# Patient Record
Sex: FEMALE | Race: WHITE | NOT HISPANIC OR LATINO | Employment: FULL TIME | URBAN - METROPOLITAN AREA
[De-identification: names, ages, dates, MRNs, and addresses within clinical notes are randomized per-mention and may not be internally consistent; named-entity substitution may affect disease eponyms.]

---

## 2021-03-27 ENCOUNTER — IMMUNIZATIONS (OUTPATIENT)
Dept: FAMILY MEDICINE CLINIC | Facility: HOSPITAL | Age: 46
End: 2021-03-27

## 2021-03-27 DIAGNOSIS — Z23 ENCOUNTER FOR IMMUNIZATION: Primary | ICD-10-CM

## 2021-03-27 PROCEDURE — 0001A SARS-COV-2 / COVID-19 MRNA VACCINE (PFIZER-BIONTECH) 30 MCG: CPT

## 2021-03-27 PROCEDURE — 91300 SARS-COV-2 / COVID-19 MRNA VACCINE (PFIZER-BIONTECH) 30 MCG: CPT

## 2021-04-04 ENCOUNTER — APPOINTMENT (OUTPATIENT)
Dept: ULTRASOUND IMAGING | Facility: HOSPITAL | Age: 46
DRG: 417 | End: 2021-04-04
Payer: COMMERCIAL

## 2021-04-04 ENCOUNTER — HOSPITAL ENCOUNTER (INPATIENT)
Facility: HOSPITAL | Age: 46
LOS: 2 days | Discharge: HOME | DRG: 417 | End: 2021-04-07
Attending: EMERGENCY MEDICINE | Admitting: SURGERY
Payer: COMMERCIAL

## 2021-04-04 ENCOUNTER — APPOINTMENT (EMERGENCY)
Dept: CT IMAGING | Facility: HOSPITAL | Age: 46
DRG: 417 | End: 2021-04-04
Payer: COMMERCIAL

## 2021-04-04 ENCOUNTER — APPOINTMENT (EMERGENCY)
Dept: RADIOLOGY | Facility: HOSPITAL | Age: 46
DRG: 417 | End: 2021-04-04
Payer: COMMERCIAL

## 2021-04-04 DIAGNOSIS — K80.50 BILIARY COLIC: ICD-10-CM

## 2021-04-04 DIAGNOSIS — K80.50 CHOLEDOCHOLITHIASIS: Primary | ICD-10-CM

## 2021-04-04 DIAGNOSIS — K80.50 CHOLEDOCHOLITHIASIS: ICD-10-CM

## 2021-04-04 LAB
ALBUMIN SERPL BCP-MCNC: 3 G/DL (ref 3.5–5)
ALBUMIN SERPL BCP-MCNC: 3.3 G/DL (ref 3.5–5)
ALP SERPL-CCNC: 119 U/L (ref 46–116)
ALP SERPL-CCNC: 122 U/L (ref 46–116)
ALT SERPL W P-5'-P-CCNC: 199 U/L (ref 12–78)
ALT SERPL W P-5'-P-CCNC: 77 U/L (ref 12–78)
ANION GAP SERPL CALCULATED.3IONS-SCNC: 11 MMOL/L (ref 4–13)
ANION GAP SERPL CALCULATED.3IONS-SCNC: 8 MMOL/L (ref 4–13)
APTT PPP: 24 SECONDS (ref 23–37)
AST SERPL W P-5'-P-CCNC: 137 U/L (ref 5–45)
AST SERPL W P-5'-P-CCNC: 305 U/L (ref 5–45)
ATRIAL RATE: 99 BPM
BASOPHILS # BLD AUTO: 0.01 THOUSANDS/ΜL (ref 0–0.1)
BASOPHILS # BLD AUTO: 0.03 THOUSANDS/ΜL (ref 0–0.1)
BASOPHILS NFR BLD AUTO: 0 % (ref 0–1)
BASOPHILS NFR BLD AUTO: 0 % (ref 0–1)
BILIRUB SERPL-MCNC: 0.78 MG/DL (ref 0.2–1)
BILIRUB SERPL-MCNC: 1.23 MG/DL (ref 0.2–1)
BILIRUB UR QL STRIP: NEGATIVE
BUN SERPL-MCNC: 12 MG/DL (ref 5–25)
BUN SERPL-MCNC: 9 MG/DL (ref 5–25)
CALCIUM ALBUM COR SERPL-MCNC: 8.8 MG/DL (ref 8.3–10.1)
CALCIUM ALBUM COR SERPL-MCNC: 9.5 MG/DL (ref 8.3–10.1)
CALCIUM SERPL-MCNC: 8 MG/DL (ref 8.3–10.1)
CALCIUM SERPL-MCNC: 8.9 MG/DL (ref 8.3–10.1)
CHLORIDE SERPL-SCNC: 107 MMOL/L (ref 100–108)
CHLORIDE SERPL-SCNC: 110 MMOL/L (ref 100–108)
CK SERPL-CCNC: 45 U/L (ref 26–192)
CLARITY UR: CLEAR
CO2 SERPL-SCNC: 24 MMOL/L (ref 21–32)
CO2 SERPL-SCNC: 24 MMOL/L (ref 21–32)
COLOR UR: YELLOW
CREAT SERPL-MCNC: 0.83 MG/DL (ref 0.6–1.3)
CREAT SERPL-MCNC: 0.9 MG/DL (ref 0.6–1.3)
D DIMER PPP FEU-MCNC: 0.44 UG/ML FEU
EOSINOPHIL # BLD AUTO: 0.01 THOUSAND/ΜL (ref 0–0.61)
EOSINOPHIL # BLD AUTO: 0.01 THOUSAND/ΜL (ref 0–0.61)
EOSINOPHIL NFR BLD AUTO: 0 % (ref 0–6)
EOSINOPHIL NFR BLD AUTO: 0 % (ref 0–6)
ERYTHROCYTE [DISTWIDTH] IN BLOOD BY AUTOMATED COUNT: 14.1 % (ref 11.6–15.1)
ERYTHROCYTE [DISTWIDTH] IN BLOOD BY AUTOMATED COUNT: 14.2 % (ref 11.6–15.1)
FLUAV RNA RESP QL NAA+PROBE: NEGATIVE
FLUBV RNA RESP QL NAA+PROBE: NEGATIVE
GFR SERPL CREATININE-BSD FRML MDRD: 76 ML/MIN/1.73SQ M
GFR SERPL CREATININE-BSD FRML MDRD: 84 ML/MIN/1.73SQ M
GLUCOSE SERPL-MCNC: 131 MG/DL (ref 65–140)
GLUCOSE SERPL-MCNC: 88 MG/DL (ref 65–140)
GLUCOSE UR STRIP-MCNC: NEGATIVE MG/DL
HCG SERPL QL: NEGATIVE
HCT VFR BLD AUTO: 38.1 % (ref 34.8–46.1)
HCT VFR BLD AUTO: 38.8 % (ref 34.8–46.1)
HGB BLD-MCNC: 11.9 G/DL (ref 11.5–15.4)
HGB BLD-MCNC: 12.2 G/DL (ref 11.5–15.4)
HGB UR QL STRIP.AUTO: NEGATIVE
IMM GRANULOCYTES # BLD AUTO: 0.02 THOUSAND/UL (ref 0–0.2)
IMM GRANULOCYTES # BLD AUTO: 0.02 THOUSAND/UL (ref 0–0.2)
IMM GRANULOCYTES NFR BLD AUTO: 0 % (ref 0–2)
IMM GRANULOCYTES NFR BLD AUTO: 0 % (ref 0–2)
INR PPP: 0.96 (ref 0.84–1.19)
KETONES UR STRIP-MCNC: NEGATIVE MG/DL
LACTATE SERPL-SCNC: 1.2 MMOL/L (ref 0.5–2)
LEUKOCYTE ESTERASE UR QL STRIP: NEGATIVE
LIPASE SERPL-CCNC: 220 U/L (ref 73–393)
LIPASE SERPL-CCNC: 7021 U/L (ref 73–393)
LYMPHOCYTES # BLD AUTO: 0.83 THOUSANDS/ΜL (ref 0.6–4.47)
LYMPHOCYTES # BLD AUTO: 1.08 THOUSANDS/ΜL (ref 0.6–4.47)
LYMPHOCYTES NFR BLD AUTO: 10 % (ref 14–44)
LYMPHOCYTES NFR BLD AUTO: 18 % (ref 14–44)
MCH RBC QN AUTO: 28.6 PG (ref 26.8–34.3)
MCH RBC QN AUTO: 28.7 PG (ref 26.8–34.3)
MCHC RBC AUTO-ENTMCNC: 31.2 G/DL (ref 31.4–37.4)
MCHC RBC AUTO-ENTMCNC: 31.4 G/DL (ref 31.4–37.4)
MCV RBC AUTO: 91 FL (ref 82–98)
MCV RBC AUTO: 92 FL (ref 82–98)
MONOCYTES # BLD AUTO: 0.44 THOUSAND/ΜL (ref 0.17–1.22)
MONOCYTES # BLD AUTO: 0.53 THOUSAND/ΜL (ref 0.17–1.22)
MONOCYTES NFR BLD AUTO: 5 % (ref 4–12)
MONOCYTES NFR BLD AUTO: 9 % (ref 4–12)
NEUTROPHILS # BLD AUTO: 4.42 THOUSANDS/ΜL (ref 1.85–7.62)
NEUTROPHILS # BLD AUTO: 6.78 THOUSANDS/ΜL (ref 1.85–7.62)
NEUTS SEG NFR BLD AUTO: 73 % (ref 43–75)
NEUTS SEG NFR BLD AUTO: 85 % (ref 43–75)
NITRITE UR QL STRIP: NEGATIVE
NRBC BLD AUTO-RTO: 0 /100 WBCS
NRBC BLD AUTO-RTO: 0 /100 WBCS
P AXIS: 57 DEGREES
PH UR STRIP.AUTO: 5.5 [PH]
PLATELET # BLD AUTO: 285 THOUSANDS/UL (ref 149–390)
PLATELET # BLD AUTO: 294 THOUSANDS/UL (ref 149–390)
PMV BLD AUTO: 10 FL (ref 8.9–12.7)
PMV BLD AUTO: 9.8 FL (ref 8.9–12.7)
POTASSIUM SERPL-SCNC: 3.9 MMOL/L (ref 3.5–5.3)
POTASSIUM SERPL-SCNC: 4 MMOL/L (ref 3.5–5.3)
PR INTERVAL: 178 MS
PROT SERPL-MCNC: 7 G/DL (ref 6.4–8.2)
PROT SERPL-MCNC: 7.5 G/DL (ref 6.4–8.2)
PROT UR STRIP-MCNC: NEGATIVE MG/DL
PROTHROMBIN TIME: 12.8 SECONDS (ref 11.6–14.5)
QRS AXIS: -12 DEGREES
QRSD INTERVAL: 72 MS
QT INTERVAL: 328 MS
QTC INTERVAL: 420 MS
RBC # BLD AUTO: 4.14 MILLION/UL (ref 3.81–5.12)
RBC # BLD AUTO: 4.26 MILLION/UL (ref 3.81–5.12)
RSV RNA RESP QL NAA+PROBE: NEGATIVE
SARS-COV-2 RNA RESP QL NAA+PROBE: NEGATIVE
SODIUM SERPL-SCNC: 142 MMOL/L (ref 136–145)
SODIUM SERPL-SCNC: 142 MMOL/L (ref 136–145)
SP GR UR STRIP.AUTO: 1.01 (ref 1–1.03)
T WAVE AXIS: 35 DEGREES
TROPONIN I SERPL-MCNC: <0.02 NG/ML
TSH SERPL DL<=0.05 MIU/L-ACNC: 2.04 UIU/ML (ref 0.36–3.74)
UROBILINOGEN UR QL STRIP.AUTO: 0.2 E.U./DL
VENTRICULAR RATE: 99 BPM
WBC # BLD AUTO: 6.07 THOUSAND/UL (ref 4.31–10.16)
WBC # BLD AUTO: 8.11 THOUSAND/UL (ref 4.31–10.16)

## 2021-04-04 PROCEDURE — 71260 CT THORAX DX C+: CPT

## 2021-04-04 PROCEDURE — 74177 CT ABD & PELVIS W/CONTRAST: CPT

## 2021-04-04 PROCEDURE — 85379 FIBRIN DEGRADATION QUANT: CPT | Performed by: EMERGENCY MEDICINE

## 2021-04-04 PROCEDURE — 96374 THER/PROPH/DIAG INJ IV PUSH: CPT

## 2021-04-04 PROCEDURE — 96361 HYDRATE IV INFUSION ADD-ON: CPT

## 2021-04-04 PROCEDURE — 84484 ASSAY OF TROPONIN QUANT: CPT | Performed by: EMERGENCY MEDICINE

## 2021-04-04 PROCEDURE — 85730 THROMBOPLASTIN TIME PARTIAL: CPT | Performed by: EMERGENCY MEDICINE

## 2021-04-04 PROCEDURE — C9113 INJ PANTOPRAZOLE SODIUM, VIA: HCPCS | Performed by: EMERGENCY MEDICINE

## 2021-04-04 PROCEDURE — 83605 ASSAY OF LACTIC ACID: CPT | Performed by: EMERGENCY MEDICINE

## 2021-04-04 PROCEDURE — 83690 ASSAY OF LIPASE: CPT | Performed by: EMERGENCY MEDICINE

## 2021-04-04 PROCEDURE — 99285 EMERGENCY DEPT VISIT HI MDM: CPT

## 2021-04-04 PROCEDURE — 99285 EMERGENCY DEPT VISIT HI MDM: CPT | Performed by: EMERGENCY MEDICINE

## 2021-04-04 PROCEDURE — 81003 URINALYSIS AUTO W/O SCOPE: CPT | Performed by: EMERGENCY MEDICINE

## 2021-04-04 PROCEDURE — 84703 CHORIONIC GONADOTROPIN ASSAY: CPT | Performed by: EMERGENCY MEDICINE

## 2021-04-04 PROCEDURE — 93005 ELECTROCARDIOGRAM TRACING: CPT

## 2021-04-04 PROCEDURE — 85025 COMPLETE CBC W/AUTO DIFF WBC: CPT | Performed by: EMERGENCY MEDICINE

## 2021-04-04 PROCEDURE — 76705 ECHO EXAM OF ABDOMEN: CPT

## 2021-04-04 PROCEDURE — 84443 ASSAY THYROID STIM HORMONE: CPT | Performed by: EMERGENCY MEDICINE

## 2021-04-04 PROCEDURE — 80053 COMPREHEN METABOLIC PANEL: CPT | Performed by: EMERGENCY MEDICINE

## 2021-04-04 PROCEDURE — 93010 ELECTROCARDIOGRAM REPORT: CPT | Performed by: INTERNAL MEDICINE

## 2021-04-04 PROCEDURE — 85025 COMPLETE CBC W/AUTO DIFF WBC: CPT | Performed by: STUDENT IN AN ORGANIZED HEALTH CARE EDUCATION/TRAINING PROGRAM

## 2021-04-04 PROCEDURE — 83690 ASSAY OF LIPASE: CPT | Performed by: NURSE PRACTITIONER

## 2021-04-04 PROCEDURE — 80053 COMPREHEN METABOLIC PANEL: CPT | Performed by: STUDENT IN AN ORGANIZED HEALTH CARE EDUCATION/TRAINING PROGRAM

## 2021-04-04 PROCEDURE — 36415 COLL VENOUS BLD VENIPUNCTURE: CPT | Performed by: EMERGENCY MEDICINE

## 2021-04-04 PROCEDURE — 99219 PR INITIAL OBSERVATION CARE/DAY 50 MINUTES: CPT | Performed by: SURGERY

## 2021-04-04 PROCEDURE — 0241U HB NFCT DS VIR RESP RNA 4 TRGT: CPT | Performed by: NURSE PRACTITIONER

## 2021-04-04 PROCEDURE — 82550 ASSAY OF CK (CPK): CPT | Performed by: EMERGENCY MEDICINE

## 2021-04-04 PROCEDURE — 96375 TX/PRO/DX INJ NEW DRUG ADDON: CPT

## 2021-04-04 PROCEDURE — 85610 PROTHROMBIN TIME: CPT | Performed by: EMERGENCY MEDICINE

## 2021-04-04 PROCEDURE — G1004 CDSM NDSC: HCPCS

## 2021-04-04 PROCEDURE — 71045 X-RAY EXAM CHEST 1 VIEW: CPT

## 2021-04-04 PROCEDURE — 99222 1ST HOSP IP/OBS MODERATE 55: CPT | Performed by: INTERNAL MEDICINE

## 2021-04-04 RX ORDER — DEXTROSE, SODIUM CHLORIDE, AND POTASSIUM CHLORIDE 5; .45; .15 G/100ML; G/100ML; G/100ML
125 INJECTION INTRAVENOUS CONTINUOUS
Status: DISCONTINUED | OUTPATIENT
Start: 2021-04-04 | End: 2021-04-06

## 2021-04-04 RX ORDER — OXYCODONE HYDROCHLORIDE 10 MG/1
10 TABLET ORAL EVERY 4 HOURS PRN
Status: DISCONTINUED | OUTPATIENT
Start: 2021-04-04 | End: 2021-04-04

## 2021-04-04 RX ORDER — ONDANSETRON 2 MG/ML
4 INJECTION INTRAMUSCULAR; INTRAVENOUS ONCE
Status: COMPLETED | OUTPATIENT
Start: 2021-04-04 | End: 2021-04-04

## 2021-04-04 RX ORDER — MAGNESIUM HYDROXIDE/ALUMINUM HYDROXICE/SIMETHICONE 120; 1200; 1200 MG/30ML; MG/30ML; MG/30ML
30 SUSPENSION ORAL ONCE
Status: COMPLETED | OUTPATIENT
Start: 2021-04-04 | End: 2021-04-04

## 2021-04-04 RX ORDER — PANTOPRAZOLE SODIUM 40 MG/1
40 INJECTION, POWDER, FOR SOLUTION INTRAVENOUS ONCE
Status: COMPLETED | OUTPATIENT
Start: 2021-04-04 | End: 2021-04-04

## 2021-04-04 RX ORDER — KETOROLAC TROMETHAMINE 30 MG/ML
15 INJECTION, SOLUTION INTRAMUSCULAR; INTRAVENOUS ONCE
Status: COMPLETED | OUTPATIENT
Start: 2021-04-04 | End: 2021-04-04

## 2021-04-04 RX ORDER — CEFAZOLIN SODIUM 1 G/50ML
1000 SOLUTION INTRAVENOUS EVERY 8 HOURS
Status: DISCONTINUED | OUTPATIENT
Start: 2021-04-04 | End: 2021-04-06

## 2021-04-04 RX ORDER — KETOROLAC TROMETHAMINE 30 MG/ML
15 INJECTION, SOLUTION INTRAMUSCULAR; INTRAVENOUS EVERY 6 HOURS PRN
Status: DISPENSED | OUTPATIENT
Start: 2021-04-04 | End: 2021-04-05

## 2021-04-04 RX ORDER — SODIUM CHLORIDE 9 MG/ML
125 INJECTION, SOLUTION INTRAVENOUS CONTINUOUS
Status: DISCONTINUED | OUTPATIENT
Start: 2021-04-04 | End: 2021-04-04

## 2021-04-04 RX ORDER — ACETAMINOPHEN 325 MG/1
650 TABLET ORAL EVERY 6 HOURS PRN
Status: DISCONTINUED | OUTPATIENT
Start: 2021-04-04 | End: 2021-04-07 | Stop reason: HOSPADM

## 2021-04-04 RX ORDER — OXYCODONE HYDROCHLORIDE 5 MG/1
5 TABLET ORAL EVERY 4 HOURS PRN
Status: DISCONTINUED | OUTPATIENT
Start: 2021-04-04 | End: 2021-04-07 | Stop reason: HOSPADM

## 2021-04-04 RX ORDER — ONDANSETRON 2 MG/ML
4 INJECTION INTRAMUSCULAR; INTRAVENOUS EVERY 6 HOURS PRN
Status: DISCONTINUED | OUTPATIENT
Start: 2021-04-04 | End: 2021-04-06 | Stop reason: SDUPTHER

## 2021-04-04 RX ORDER — OXYCODONE HYDROCHLORIDE 5 MG/1
5 TABLET ORAL EVERY 4 HOURS PRN
Status: DISCONTINUED | OUTPATIENT
Start: 2021-04-04 | End: 2021-04-04

## 2021-04-04 RX ADMIN — KETOROLAC TROMETHAMINE 15 MG: 30 INJECTION, SOLUTION INTRAMUSCULAR at 02:35

## 2021-04-04 RX ADMIN — FAMOTIDINE 20 MG: 10 INJECTION INTRAVENOUS at 01:55

## 2021-04-04 RX ADMIN — DEXTROSE, SODIUM CHLORIDE, AND POTASSIUM CHLORIDE 125 ML/HR: 5; .45; .15 INJECTION INTRAVENOUS at 13:12

## 2021-04-04 RX ADMIN — CEFAZOLIN SODIUM 1000 MG: 1 SOLUTION INTRAVENOUS at 19:10

## 2021-04-04 RX ADMIN — SODIUM CHLORIDE 125 ML/HR: 0.9 INJECTION, SOLUTION INTRAVENOUS at 06:13

## 2021-04-04 RX ADMIN — CEFAZOLIN SODIUM 1000 MG: 1 SOLUTION INTRAVENOUS at 11:00

## 2021-04-04 RX ADMIN — ALUMINA, MAGNESIA, AND SIMETHICONE ORAL SUSPENSION REGULAR STRENGTH 30 ML: 1200; 1200; 120 SUSPENSION ORAL at 01:50

## 2021-04-04 RX ADMIN — METRONIDAZOLE 500 MG: 500 INJECTION, SOLUTION INTRAVENOUS at 18:25

## 2021-04-04 RX ADMIN — IOHEXOL 100 ML: 350 INJECTION, SOLUTION INTRAVENOUS at 02:55

## 2021-04-04 RX ADMIN — SODIUM CHLORIDE 1000 ML: 0.9 INJECTION, SOLUTION INTRAVENOUS at 01:54

## 2021-04-04 RX ADMIN — DEXTROSE, SODIUM CHLORIDE, AND POTASSIUM CHLORIDE 125 ML/HR: 5; .45; .15 INJECTION INTRAVENOUS at 22:44

## 2021-04-04 RX ADMIN — METRONIDAZOLE 500 MG: 500 INJECTION, SOLUTION INTRAVENOUS at 11:46

## 2021-04-04 RX ADMIN — ONDANSETRON 4 MG: 2 INJECTION INTRAMUSCULAR; INTRAVENOUS at 01:53

## 2021-04-04 RX ADMIN — PANTOPRAZOLE SODIUM 40 MG: 40 INJECTION, POWDER, FOR SOLUTION INTRAVENOUS at 02:35

## 2021-04-04 NOTE — UTILIZATION REVIEW
Initial Clinical Review    Admission: Date/Time/Statement:   Admission Orders (From admission, onward)     Ordered        04/04/21 0445  Place in Observation  Once                   Orders Placed This Encounter   Procedures    Place in Observation     Standing Status:   Standing     Number of Occurrences:   1     Order Specific Question:   Level of Care     Answer:   Med Surg [16]     ED Arrival Information     Expected Arrival Acuity Means of Arrival Escorted By Service Admission Type    - 4/4/2021 01:08 Urgent Walk-In Self Surgery-General Urgent    Arrival Complaint    Indigestion,abd pain        Chief Complaint   Patient presents with    Heartburn     started 3 days ago  +nausea  pt reports relief with belching  Assessment/Plan:   50y Female to ED presents with abdominal pain, nausea started on Thursday located in the epigastrium and RUQ and radiates to the upper back  Pain worsening with eating  Admit Observation level of care for Biliary colic vs cholecystitis, possible CBD stone on CT  RUQ US  Trend LFTs  Possible laparoscopic cholecystectomy  NPO/ IVFs  Pain control  On exam; mildly tender epigastrium  4/4 GI cons; Choledocholithiasis  RUQ/epigastric pain and nausea secondary to US findings of choledocholithiasis and gallstones  she had shaking chills at home concerning for cholangitis, but afebrile without leukocytosis or total bilirubin on presentation  LFTs worsened this morning, , , alkaline phosphatase 122, total bilirubin 1 23  Pain has improved and she has no further nausea, asking for clear liquids  US gallbladder: CBD dilated measuring up to 9 mm as well as mild intrahepatic biliary ductal dilatation  There is a 7 mm echogenic focus in the distal CBD in keeping with choledocholithiasis with mild gallbladder wall thickening, which may be secondary to choledocholithiasis with early acute cholecystitis not included   Plan for ERCP with possible sphincterotomy tomorrow for further evaluation and removal of stones  Clear liquid diet  NPO midnight  Continue antibiotics to cover for cholangitis due to obstruction  Timing of laparoscopic cholecystectomy to be determined by surgery  ED Triage Vitals   Temperature Pulse Respirations Blood Pressure SpO2   04/04/21 0123 04/04/21 0123 04/04/21 0123 04/04/21 0123 04/04/21 0123   98 3 °F (36 8 °C) 103 18 158/74 98 %      Temp Source Heart Rate Source Patient Position - Orthostatic VS BP Location FiO2 (%)   04/04/21 0123 04/04/21 0123 04/04/21 0123 04/04/21 0123 --   Oral Monitor Lying Right arm       Pain Score       04/04/21 0235       2          Wt Readings from Last 1 Encounters:   04/04/21 126 kg (276 lb 10 8 oz)     Additional Vital Signs:   04/04/21 0724  --  --  --  --  --  --  None (Room air)  --   04/04/21 0618  --  --  --  --  --  --  None (Room air)  --   04/04/21 0400  --  96  18  124/66  88  99 %  None (Room air)  Lying   04/04/21 0345  --  90  18  158/74  --  98 %  None (Room air)       Pertinent Labs/Diagnostic Test Results:   4/4  CT Chest/Abd/Pelvis - There is mild intrahepatic biliary ductal dilatation and questionable stone in the common duct (2:58; 601:77) raising concern for choledocholithiasis  Recommend MRCP for further evaluation  There is a cystic outpouching from the gallbladder; gallbladder demonstrates calcifications in the wall  The patchy may represent a gallbladder diverticulum or a form of adenomyomatosis  Considering the above-mentioned choledocholithiasis, consider   cholecystitis in the appropriate clinical setting      PCXR -     US Gallbladder -       Results from last 7 days   Lab Units 04/04/21  0144   WBC Thousand/uL 8 11   HEMOGLOBIN g/dL 12 2   HEMATOCRIT % 38 8   PLATELETS Thousands/uL 294   NEUTROS ABS Thousands/µL 6 78         Results from last 7 days   Lab Units 04/04/21  0144   SODIUM mmol/L 142   POTASSIUM mmol/L 3 9   CHLORIDE mmol/L 107   CO2 mmol/L 24   ANION GAP mmol/L 11   BUN mg/dL 12 CREATININE mg/dL 0 90   EGFR ml/min/1 73sq m 76   CALCIUM mg/dL 8 9     Results from last 7 days   Lab Units 04/04/21  0144   AST U/L 137*   ALT U/L 77   ALK PHOS U/L 119*   TOTAL PROTEIN g/dL 7 5   ALBUMIN g/dL 3 3*   TOTAL BILIRUBIN mg/dL 0 78         Results from last 7 days   Lab Units 04/04/21  0144   GLUCOSE RANDOM mg/dL 131       Results from last 7 days   Lab Units 04/04/21  0144   CK TOTAL U/L 45     Results from last 7 days   Lab Units 04/04/21  0144   TROPONIN I ng/mL <0 02     Results from last 7 days   Lab Units 04/04/21  0144   D-DIMER QUANTITATIVE ug/ml FEU 0 44     Results from last 7 days   Lab Units 04/04/21  0144   PROTIME seconds 12 8   INR  0 96   PTT seconds 24     Results from last 7 days   Lab Units 04/04/21  0144   TSH 3RD GENERATON uIU/mL 2 037         Results from last 7 days   Lab Units 04/04/21  0144   LACTIC ACID mmol/L 1 2     Results from last 7 days   Lab Units 04/04/21  0144   LIPASE u/L 220             Results from last 7 days   Lab Units 04/04/21  0240   CLARITY UA  Clear   COLOR UA  Yellow   SPEC GRAV UA  1 010   PH UA  5 5   GLUCOSE UA mg/dl Negative   KETONES UA mg/dl Negative   BLOOD UA  Negative   PROTEIN UA mg/dl Negative   NITRITE UA  Negative   BILIRUBIN UA  Negative   UROBILINOGEN UA E U /dl 0 2   LEUKOCYTES UA  Negative       ED Treatment:   Medication Administration from 04/04/2021 0108 to 04/04/2021 0543       Date/Time Order Dose Route Action     04/04/2021 0153 ondansetron (ZOFRAN) injection 4 mg 4 mg Intravenous Given     04/04/2021 0155 famotidine (PEPCID) injection 20 mg 20 mg Intravenous Given     04/04/2021 0150 aluminum-magnesium hydroxide-simethicone (MYLANTA) oral suspension 30 mL 30 mL Oral Given     04/04/2021 0154 sodium chloride 0 9 % bolus 1,000 mL 1,000 mL Intravenous New Bag     04/04/2021 0235 pantoprazole (PROTONIX) injection 40 mg 40 mg Intravenous Given     04/04/2021 0235 ketorolac (TORADOL) injection 15 mg 15 mg Intravenous Given     04/04/2021 0255 iohexol (OMNIPAQUE) 350 MG/ML injection (MULTI-DOSE) 100 mL 100 mL Intravenous Given        History reviewed  No pertinent past medical history  Present on Admission:  **None**      Admitting Diagnosis: Biliary colic [O93 56]  Choledocholithiasis [K80 50]  Indigestion [K30]  Abdominal pain [R10 9]  Age/Sex: 50 y o  female     Admission Orders:  Scheduled Medications:  enoxaparin, 40 mg, Subcutaneous, Daily      Continuous IV Infusions:  sodium chloride, 125 mL/hr, Intravenous, Continuous      PRN Meds:  acetaminophen, 650 mg, Oral, Q6H PRN  ketorolac, 15 mg, Intravenous, Q6H PRN  ondansetron, 4 mg, Intravenous, Q6H PRN  oxyCODONE, 5 mg, Oral, Q4H PRN      NPO; Sips with meds    Network Utilization Review Department  ATTENTION: Please call with any questions or concerns to [phone] and carefully listen to the prompts so that you are directed to the right person  All voicemails are confidential   Elroy Matson all requests for admission clinical reviews, approved or denied determinations and any other requests to dedicated fax number below belonging to the campus where the patient is receiving treatment   List of dedicated fax numbers for the Facilities:  [address] DENIALS (Administrative/Medical Necessity) [phone]   [address] (Maternity/NICU/Pediatrics) [phone]   [address] [address] [facility] Tierra Bhatt 1277 (Sukh Fisher "Lola" 103) [address] [facility] 28 Daphney Wong 1481 [address] [place] [address] [phone]

## 2021-04-04 NOTE — ED PROVIDER NOTES
History  Chief Complaint   Patient presents with    Heartburn     started 3 days ago  +nausea  pt reports relief with belching  Patient is a 50year old female with several days of worsening nausea, indigestion, heartburn, chest tightness  No vomiting or diarrhea  No GI bleeding  Has had prior ectopic pregnancy surgery  LMP- just finished  Tried pepcid tonight after having niesha without relief  States her HR has been elevated at times and then low  No recent old records from this ED seen on computer system  [facility] checked on this patient and no Rx found  (+) pleuritic pain  No travel  No alcohol use for past 2-3 weeks  Pain radiates to the back  Early CAD in parents  History provided by:  Patient   used: No    Heartburn  Associated symptoms: chest pain and nausea    Associated symptoms: no cough, no fever, no shortness of breath and no vomiting        None       History reviewed  No pertinent past medical history  Past Surgical History:   Procedure Laterality Date    ECTOPIC PREGNANCY SURGERY         History reviewed  No pertinent family history  I have reviewed and agree with the history as documented  E-Cigarette/Vaping    E-Cigarette Use Current Some Day User     Comments CBD      E-Cigarette/Vaping Substances     Social History     Tobacco Use    Smoking status: Never Smoker    Smokeless tobacco: Never Used   Substance Use Topics    Alcohol use: Yes     Comment: socially    Drug use: Yes     Types: Marijuana       Review of Systems   Constitutional: Negative for fever  Respiratory: Positive for chest tightness  Negative for cough and shortness of breath  Cardiovascular: Positive for chest pain  Gastrointestinal: Positive for nausea  Negative for blood in stool and vomiting  Musculoskeletal: Positive for back pain  All other systems reviewed and are negative  Physical Exam  Physical Exam  Vitals signs and nursing note reviewed  Constitutional:       General: She is in acute distress (moderate)  HENT:      Head: Normocephalic and atraumatic  Mouth/Throat:      Comments: Mask in place  Eyes:      General: No scleral icterus  Neck:      Musculoskeletal: Normal range of motion and neck supple  Cardiovascular:      Rate and Rhythm: Regular rhythm  Tachycardia present  Heart sounds: Normal heart sounds  No murmur  Pulmonary:      Effort: Pulmonary effort is normal  No respiratory distress  Breath sounds: Normal breath sounds  No stridor  No wheezing, rhonchi or rales  Chest:      Chest wall: No tenderness  Abdominal:      General: Bowel sounds are normal  There is no distension  Palpations: Abdomen is soft  Tenderness: There is no abdominal tenderness  Musculoskeletal:         General: No deformity  Right lower leg: No edema  Left lower leg: No edema  Skin:     General: Skin is warm and dry  Coloration: Skin is not jaundiced  Findings: No rash  Neurological:      General: No focal deficit present  Mental Status: She is alert and oriented to person, place, and time  Psychiatric:      Comments: Somewhat anxious            Vital Signs  ED Triage Vitals   Temperature Pulse Respirations Blood Pressure SpO2   04/04/21 0123 04/04/21 0123 04/04/21 0123 04/04/21 0123 04/04/21 0123   98 3 °F (36 8 °C) 103 18 158/74 98 %      Temp Source Heart Rate Source Patient Position - Orthostatic VS BP Location FiO2 (%)   04/04/21 0123 04/04/21 0123 04/04/21 0123 04/04/21 0123 --   Oral Monitor Lying Right arm       Pain Score       04/04/21 0235       2           Vitals:    04/04/21 0123 04/04/21 0345 04/04/21 0400   BP: 158/74 158/74 124/66   Pulse: 103 90 96   Patient Position - Orthostatic VS: Lying  Lying         Visual Acuity      ED Medications  Medications   sodium chloride 0 9 % infusion (has no administration in time range)   ondansetron (ZOFRAN) injection 4 mg (has no administration in time range)   enoxaparin (LOVENOX) subcutaneous injection 40 mg (has no administration in time range)   oxyCODONE (ROXICODONE) IR tablet 5 mg (has no administration in time range)   oxyCODONE (ROXICODONE) immediate release tablet 10 mg (has no administration in time range)   acetaminophen (TYLENOL) tablet 650 mg (has no administration in time range)   ondansetron (ZOFRAN) injection 4 mg (4 mg Intravenous Given 4/4/21 0153)   famotidine (PEPCID) injection 20 mg (20 mg Intravenous Given 4/4/21 0155)   aluminum-magnesium hydroxide-simethicone (MYLANTA) oral suspension 30 mL (30 mL Oral Given 4/4/21 0150)   sodium chloride 0 9 % bolus 1,000 mL (0 mL Intravenous Stopped 4/4/21 0254)   pantoprazole (PROTONIX) injection 40 mg (40 mg Intravenous Given 4/4/21 0235)   ketorolac (TORADOL) injection 15 mg (15 mg Intravenous Given 4/4/21 0235)   iohexol (OMNIPAQUE) 350 MG/ML injection (MULTI-DOSE) 100 mL (100 mL Intravenous Given 4/4/21 0255)       Diagnostic Studies  Results Reviewed     Procedure Component Value Units Date/Time    Comprehensive metabolic panel [[id number]]     Lab Status: No result Specimen: Blood     CBC and differential [[id number]]     Lab Status: No result Specimen: Blood     Platelet count [[id number]]     Lab Status: No result Specimen: Blood     UA w Reflex to Microscopic w Reflex to Culture [[id number]] Collected: 04/04/21 0240    Lab Status: Final result Specimen: Urine, Clean Catch Updated: 04/04/21 0306     Color, UA Yellow     Clarity, UA Clear     Specific Gravity, UA 1 010     pH, UA 5 5     Leukocytes, UA Negative     Nitrite, UA Negative     Protein, UA Negative mg/dl      Glucose, UA Negative mg/dl      Ketones, UA Negative mg/dl      Urobilinogen, UA 0 2 E U /dl      Bilirubin, UA Negative     Blood, UA Negative    Lipase [[id number]]  (Normal) Collected: 04/04/21 0144    Lab Status: Final result Specimen: Blood from Arm, Left Updated: 04/04/21 0220     Lipase 220 u/L     CK Total with Reflex CKMB [[id number]]  (Normal) Collected: 04/04/21 0144    Lab Status: Final result Specimen: Blood from Arm, Left Updated: 04/04/21 0220     Total CK 45 U/L     hCG, qualitative pregnancy [[id number]]  (Normal) Collected: 04/04/21 0144    Lab Status: Final result Specimen: Blood from Arm, Left Updated: 04/04/21 0220     Preg, Serum Negative    TSH, 3rd generation with Free T4 reflex [[id number]]  (Normal) Collected: 04/04/21 0144    Lab Status: Final result Specimen: Blood from Arm, Left Updated: 04/04/21 0220     TSH 3RD GENERATON 2 037 uIU/mL     Narrative:      Patients undergoing fluorescein dye angiography may retain small amounts of fluorescein in the body for 48-72 hours post procedure  Samples containing fluorescein can produce falsely depressed TSH values  If the patient had this procedure,a specimen should be resubmitted post fluorescein clearance  Lactic acid [[id number]]  (Normal) Collected: 04/04/21 0144    Lab Status: Final result Specimen: Blood from Arm, Left Updated: 04/04/21 0347     LACTIC ACID 1 2 mmol/L     Narrative:      Result may be elevated if tourniquet was used during collection      Troponin I [[id number]]  (Normal) Collected: 04/04/21 0144    Lab Status: Final result Specimen: Blood from Arm, Left Updated: 04/04/21 0213     Troponin I <0 02 ng/mL     Comprehensive metabolic panel [[id number]]  (Abnormal) Collected: 04/04/21 0144    Lab Status: Final result Specimen: Blood from Arm, Left Updated: 04/04/21 0211     Sodium 142 mmol/L      Potassium 3 9 mmol/L      Chloride 107 mmol/L      CO2 24 mmol/L      ANION GAP 11 mmol/L      BUN 12 mg/dL      Creatinine 0 90 mg/dL      Glucose 131 mg/dL      Calcium 8 9 mg/dL      Corrected Calcium 9 5 mg/dL       U/L      ALT 77 U/L      Alkaline Phosphatase 119 U/L      Total Protein 7 5 g/dL      Albumin 3 3 g/dL      Total Bilirubin 0 78 mg/dL      eGFR 76 ml/min/1 73sq m     Narrative:      [place] guidelines for Chronic Kidney Disease (CKD):     Stage 1 with normal or high GFR (GFR > 90 mL/min/1 73 square meters)    Stage 2 Mild CKD (GFR = 60-89 mL/min/1 73 square meters)    Stage 3A Moderate CKD (GFR = 45-59 mL/min/1 73 square meters)    Stage 3B Moderate CKD (GFR = 30-44 mL/min/1 73 square meters)    Stage 4 Severe CKD (GFR = 15-29 mL/min/1 73 square meters)    Stage 5 End Stage CKD (GFR <15 mL/min/1 73 square meters)  Note: GFR calculation is accurate only with a steady state creatinine    D-Dimer [[id number]]  (Normal) Collected: 04/04/21 0144    Lab Status: Final result Specimen: Blood from Arm, Left Updated: 04/04/21 0207     D-Dimer, Quant 0 44 ug/ml FEU     Protime-INR [[id number]]  (Normal) Collected: 04/04/21 0144    Lab Status: Final result Specimen: Blood from Arm, Left Updated: 04/04/21 0206     Protime 12 8 seconds      INR 0 96    APTT [[id number]]  (Normal) Collected: 04/04/21 0144    Lab Status: Final result Specimen: Blood from Arm, Left Updated: 04/04/21 0206     PTT 24 seconds     CBC and differential [[id number]]  (Abnormal) Collected: 04/04/21 0144    Lab Status: Final result Specimen: Blood from Arm, Left Updated: 04/04/21 0201     WBC 8 11 Thousand/uL      RBC 4 26 Million/uL      Hemoglobin 12 2 g/dL      Hematocrit 38 8 %      MCV 91 fL      MCH 28 6 pg      MCHC 31 4 g/dL      RDW 14 2 %      MPV 10 0 fL      Platelets 092 Thousands/uL      nRBC 0 /100 WBCs      Neutrophils Relative 85 %      Immat GRANS % 0 %      Lymphocytes Relative 10 %      Monocytes Relative 5 %      Eosinophils Relative 0 %      Basophils Relative 0 %      Neutrophils Absolute 6 78 Thousands/µL      Immature Grans Absolute 0 02 Thousand/uL      Lymphocytes Absolute 0 83 Thousands/µL      Monocytes Absolute 0 44 Thousand/µL      Eosinophils Absolute 0 01 Thousand/µL      Basophils Absolute 0 03 Thousands/µL                  CT chest abdomen pelvis w contrast   ED Interpretation by Florentin Anderson MD (04/04 4384) FINDINGS:     CHEST     LUNGS:  Lungs are clear  There is no tracheal or endobronchial lesion      PLEURA:  Unremarkable      HEART/GREAT VESSELS:  Unremarkable for patient's age      MEDIASTINUM AND GUERITA:  Small hiatal hernia noted  No mediastinal or hilar lymphadenopathy      CHEST WALL AND LOWER NECK:   Unremarkable      ABDOMEN     LIVER/BILIARY TREE:  There is mild intrahepatic biliary ductal dilatation and questionable stone in the common duct (2:58; 601:77) raising concern for choledocholithiasis  Recommend MRCP for further evaluation      GALLBLADDER:  There is a cystic outpouching from the gallbladder; gallbladder demonstrates calcifications in the wall  The patchy may represent a gallbladder diverticulum or a form of adenomyomatosis      SPLEEN:  Unremarkable      PANCREAS:  Unremarkable      ADRENAL GLANDS:  Unremarkable      KIDNEYS/URETERS:  Unremarkable  No hydronephrosis      STOMACH AND BOWEL:  There is colonic diverticulosis without evidence of acute diverticulitis         APPENDIX:  A normal appendix was visualized      ABDOMINOPELVIC CAVITY:  No ascites  No pneumoperitoneum  No lymphadenopathy      VESSELS:  Unremarkable for patient's age      PELVIS     REPRODUCTIVE ORGANS:  Unremarkable for patient's age      URINARY BLADDER:  Unremarkable      ABDOMINAL WALL/INGUINAL REGIONS:  Unremarkable      OSSEOUS STRUCTURES:  No acute fracture or destructive osseous lesion      IMPRESSION:     There is mild intrahepatic biliary ductal dilatation and questionable stone in the common duct (2:58; 601:77) raising concern for choledocholithiasis  Recommend MRCP for further evaluation      There is a cystic outpouching from the gallbladder; gallbladder demonstrates calcifications in the wall  The patchy may represent a gallbladder diverticulum or a form of adenomyomatosis   Considering the above-mentioned choledocholithiasis, consider   cholecystitis in the appropriate clinical setting      The study was marked in EPIC for immediate notification      Workstat   ion performed: [id number]      Final Result by Allegra Gaffney MD (04/04 0350)      There is mild intrahepatic biliary ductal dilatation and questionable stone in the common duct (2:58; 601:77) raising concern for choledocholithiasis  Recommend MRCP for further evaluation  There is a cystic outpouching from the gallbladder; gallbladder demonstrates calcifications in the wall  The patchy may represent a gallbladder diverticulum or a form of adenomyomatosis  Considering the above-mentioned choledocholithiasis, consider    cholecystitis in the appropriate clinical setting  The study was marked in [facility] for immediate notification  Workstation performed: [id number]         XR chest 1 view portable   ED Interpretation by Maile Phoenix, MD (04/04 0211)   No acute disease read by me  US GALLBLADDER    (Results Pending)              Procedures  ECG 12 Lead Documentation Only    Date/Time: 4/4/2021 1:33 AM  Performed by: Maile Phoenix, MD  Authorized by: Maile Phoenix, MD     Indications / Diagnosis:  Chest tightness  ECG reviewed by me, the ED Provider: yes    Patient location:  ED  Previous ECG:     Previous ECG:  Unavailable  Rate:     ECG rate:  99    ECG rate assessment: normal    Rhythm:     Rhythm: sinus rhythm    Ectopy:     Ectopy: none    QRS:     QRS axis:  Normal    QRS intervals:  Normal  Conduction:     Conduction normal: low voltage  ST segments:     ST segments:  Normal  T waves:     T waves: normal    Comments:      Possible LAE             ED Course  ED Course as of Apr 04 0450   Sun Apr 04, 2021   0223 Labs, EKG, CXR d/w patient  Patient still with pain so IV toradol and protonix ordered  CT ordered  Patient states she drove here  2053 CT d/w patient and surgical resident consulted                   HEART Risk Score      Most Recent Value   Heart Score Risk Calculator   History  1 Filed at: 04/04/2021 0220   ECG 0 Filed at: 04/04/2021 0220   Age  1 Filed at: 04/04/2021 0220   Risk Factors  1 Filed at: 04/04/2021 0220   Troponin  0 Filed at: 04/04/2021 0220   HEART Score  3 Filed at: 04/04/2021 0220              PERC Rule for PE      Most Recent Value   PERC Rule for PE   Age >=50  --   HR >=100  1 Filed at: 04/04/2021 0211   O2 Sat on room air < 95%  --   History of PE or DVT  --   Recent trauma or surgery  --   Hemoptysis  --   Exogenous estrogen  --   Unilateral leg swelling  --   PERC Rule for PE Results  1 Filed at: 04/04/2021 0211                ADELIA Risk Score      Most Recent Value   Age >= 72  0 Filed at: 04/04/2021 2440   Known CAD (stenosis >= 50%)  0 Filed at: 04/04/2021 3716   Recent (<=24 hrs) Service Angina  0 Filed at: 04/04/2021 0220   ST Deviation >= 0 5 mm  0 Filed at: 04/04/2021 0220   3+ CAD Risk Factors (FHx, HTN, HLP, DM, Smoker)  0 Filed at: 04/04/2021 0220   Aspirin Use Past 7 Days  0 Filed at: 04/04/2021 0220   Elevated Cardiac Markers  0 Filed at: 04/04/2021 0220   ADELIA Risk Score (Calculated)  0 Filed at: 04/04/2021 8320        Wells' Criteria for PE      Most Recent Value   Wells' Criteria for PE   Clinical signs and symptoms of DVT  0 Filed at: 04/04/2021 0211   PE is primary diagnosis or equally likely  3 Filed at: 04/04/2021 0211   HR >100  1 5 Filed at: 04/04/2021 0211   Immobilization at least 3 days or Surgery in the previous 4 weeks  0 Filed at: 04/04/2021 0211   Previous, objectively diagnosed PE or DVT  0 Filed at: 04/04/2021 0211   Hemoptysis  0 Filed at: 04/04/2021 0211   Malignancy with treatment within 6 months or palliative  0 Filed at: 04/04/2021 0211   Carlos' Criteria Total  4 5 Filed at: 04/04/2021 0211                MDM  Number of Diagnoses or Management Options  Diagnosis management comments: DDX including but not limited to: ACS, MI, PE, PTX, pneumonia, doubt dissection, pleurisy, pericarditis, myocarditis, rhabdomyolysis, GI etiology, GERD, pancreatitis, hepatitis, biliary colic, cholecystitis  Amount and/or Complexity of Data Reviewed  Clinical lab tests: ordered and reviewed  Tests in the radiology section of CPT®: ordered and reviewed  Decide to obtain previous medical records or to obtain history from someone other than the patient: yes  Independent visualization of images, tracings, or specimens: yes        Disposition  Final diagnoses:   Biliary colic   Choledocholithiasis - possible     Time reflects when diagnosis was documented in both MDM as applicable and the Disposition within this note     Time User Action Codes Description Comment    4/4/2021  4:50 AM Greg Lincoln Add [R11 76] Biliary colic     1/0/1980  8:57 AM Greg Lincoln Add [K80 50] Choledocholithiasis     4/4/2021  4:50 AM Greg Lincoln Modify [K80 50] Choledocholithiasis possible      ED Disposition     ED Disposition Condition Date/Time Comment    Admit Stable Sun Apr 4, 2021  4:49 AM Case was discussed with surgical resident, Dr Des Davis and the patient's admission status was agreed to be Admission Status: observation status to the service of Dr Araceli Diehl   Follow-up Information    None         Patient's Medications    No medications on file     No discharge procedures on file      PDMP Review       Value Time User    PDMP Reviewed  Yes 4/4/2021  1:12 AM Carey Bella MD          ED Provider  Electronically Signed by           Carey Bella MD  04/04/21 [address], MD  04/04/21 4874

## 2021-04-04 NOTE — PLAN OF CARE
Problem: PAIN - ADULT  Goal: Verbalizes/displays adequate comfort level or baseline comfort level  Description: Interventions:  - Encourage patient to monitor pain and request assistance  - Assess pain using appropriate pain scale  - Administer analgesics based on type and severity of pain and evaluate response  - Implement non-pharmacological measures as appropriate and evaluate response  - Consider cultural and social influences on pain and pain management  - Notify physician/advanced practitioner if interventions unsuccessful or patient reports new pain  Outcome: Progressing     Problem: INFECTION - ADULT  Goal: Absence or prevention of progression during hospitalization  Description: INTERVENTIONS:  - Assess and monitor for signs and symptoms of infection  - Monitor lab/diagnostic results  - Monitor all insertion sites, i e  indwelling lines, tubes, and drains  - Monitor endotracheal if appropriate and nasal secretions for changes in amount and color  - [place] appropriate cooling/warming therapies per order  - Administer medications as ordered  - Instruct and encourage patient and family to use good hand hygiene technique  - Identify and instruct in appropriate isolation precautions for identified infection/condition  Outcome: Progressing  Goal: Absence of fever/infection during neutropenic period  Description: INTERVENTIONS:  - Monitor WBC    Outcome: Progressing     Problem: SAFETY ADULT  Goal: Patient will remain free of falls  Description: INTERVENTIONS:  - Assess patient frequently for physical needs  -  Identify cognitive and physical deficits and behaviors that affect risk of falls    -  [place] fall precautions as indicated by assessment   - Educate patient/family on patient safety including physical limitations  - Instruct patient to call for assistance with activity based on assessment  - Modify environment to reduce risk of injury  - Consider OT/PT consult to assist with strengthening/mobility  Outcome: Progressing  Goal: Maintain or return to baseline ADL function  Description: INTERVENTIONS:  -  Assess patient's ability to carry out ADLs; assess patient's baseline for ADL function and identify physical deficits which impact ability to perform ADLs (bathing, care of mouth/teeth, toileting, grooming, dressing, etc )  - Assess/evaluate cause of self-care deficits   - Assess range of motion  - Assess patient's mobility; develop plan if impaired  - Assess patient's need for assistive devices and provide as appropriate  - Encourage maximum independence but intervene and supervise when necessary  - Involve family in performance of ADLs  - Assess for home care needs following discharge   - Consider OT consult to assist with ADL evaluation and planning for discharge  - Provide patient education as appropriate  Outcome: Progressing  Goal: Maintain or return mobility status to optimal level  Description: INTERVENTIONS:  - Assess patient's baseline mobility status (ambulation, transfers, stairs, etc )    - Identify cognitive and physical deficits and behaviors that affect mobility  - Identify mobility aids required to assist with transfers and/or ambulation (gait belt, sit-to-stand, lift, walker, cane, etc )  - [place] fall precautions as indicated by assessment  - Record patient progress and toleration of activity level on Mobility SBAR; progress patient to next Phase/Stage  - Instruct patient to call for assistance with activity based on assessment  - Consider rehabilitation consult to assist with strengthening/weightbearing, etc   Outcome: Progressing     Problem: DISCHARGE PLANNING  Goal: Discharge to home or other facility with appropriate resources  Description: INTERVENTIONS:  - Identify barriers to discharge w/patient and caregiver  - Arrange for needed discharge resources and transportation as appropriate  - Identify discharge learning needs (meds, wound care, etc )  - Arrange for interpretive services to assist at discharge as needed  - Refer to Case Management Department for coordinating discharge planning if the patient needs post-hospital services based on physician/advanced practitioner order or complex needs related to functional status, cognitive ability, or social support system  Outcome: Progressing     Problem: Knowledge Deficit  Goal: Patient/family/caregiver demonstrates understanding of disease process, treatment plan, medications, and discharge instructions  Description: Complete learning assessment and assess knowledge base    Interventions:  - Provide teaching at level of understanding  - Provide teaching via preferred learning methods  Outcome: Progressing     Problem: GASTROINTESTINAL - ADULT  Goal: Minimal or absence of nausea and/or vomiting  Description: INTERVENTIONS:  - Administer IV fluids if ordered to ensure adequate hydration  - Maintain NPO status until nausea and vomiting are resolved  - Nasogastric tube if ordered  - Administer ordered antiemetic medications as needed  - Provide nonpharmacologic comfort measures as appropriate  - Advance diet as tolerated, if ordered  - Consider nutrition services referral to assist patient with adequate nutrition and appropriate food choices  Outcome: Progressing  Goal: Maintains adequate nutritional intake  Description: INTERVENTIONS:  - Monitor percentage of each meal consumed  - Identify factors contributing to decreased intake, treat as appropriate  - Assist with meals as needed  - Monitor I&O, weight, and lab values if indicated  - Obtain nutrition services referral as needed  Outcome: Progressing

## 2021-04-04 NOTE — CONSULTS
Consultation - [facility]   Dorthey Burn 50 y o  female MRN: [id number]  Unit/Bed#: S -01 Encounter: [id number]        Inpatient consult to gastroenterology  Consult performed by: Yvonna Skiff, CRNP  Consult ordered by: Arvin Nation DO          Reason for Consult / Principal Problem:     Choledocholithiasis      ASSESSMENT AND PLAN:        1) Choledocholithiasis   RUQ/epigastric pain and nausea secondary to US findings of choledocholithiasis and gallstones  Patient states she had shaking chills at home concerning for cholangitis, but afebrile without leukocytosis or total bilirubin on presentation  LFTs worsened this morning, , , alkaline phosphatase 122, total bilirubin 1 23  Pain has improved and she has no further nausea, asking for clear liquids  Vital signs stable   -US gallbladder: CBD dilated measuring up to 9 mm as well as mild intrahepatic biliary ductal dilatation  There is a 7 mm echogenic focus in the distal CBD in keeping with choledocholithiasis with mild gallbladder wall thickening, which may be secondary to choledocholithiasis with early acute cholecystitis not included  -Plan for ERCP with possible sphincterotomy tomorrow for further evaluation and removal of stones  Prep and procedure explained, PT 12 8/INR 0 96  -Okay for clear liquid diet, NPO past midnight  -Continue antibiotics to cover for cholangitis due to obstruction  -Monitor CBC, CMP, lipase  -Timing of laparoscopic cholecystectomy to be determined by surgery    Thank you for the consultation  Case discussed with Dr Clarissa Newberry     ______________________________________________________________________    HPI:  This is a 75-year-old female with no significant past medical history who presented with epigastric and right upper quadrant pain which started Thursday night after eating fried mushrooms and was associated with nausea and chills    Pain improved temporarily but then became more severe so she presented to ER for further evaluation  , alkaline phosphatase 119 in the ED with mild intrahepatic biliary ductal dilatation and questionable stone in the common bile duct concerning for choledocholithiasis on imaging and possible cholecystitis  Patient was told a long time ago when she had an ectopic pregnancy surgery that she had abnormalities with her gallbladder, and reports intermittent symptoms of temporary pain and nausea after eating for a while  Ultrasound of the gallbladder shows the common bile duct is dilated measuring up to 9 mm as well as mild intrahepatic biliary ductal dilatation with 7 mm echogenic focus in the distal CBD keeping with choledocholithiasis  Cholelithiasis with mild gallbladder wall thickening, may be secondary to choledocholithiasis with early acute cholecystitis not excluded in the appropriate clinical setting  No pericholecystic fluid or sonographic Astorga's sign  Currently patient is right upper quadrant pain is 2/10, she denies any further nausea  Vital signs are stable, and she is asking to be able to drink fluids  REVIEW OF SYSTEMS:    CONSTITUTIONAL: Denies any fever, chills, rigors, and weight loss  HEENT: No earache or tinnitus  Denies hearing loss or visual disturbances  CARDIOVASCULAR: No chest pain or palpitations  RESPIRATORY: Denies any cough, hemoptysis, shortness of breath or dyspnea on exertion  GASTROINTESTINAL: As noted in the History of Present Illness  GENITOURINARY: No problems with urination  Denies any hematuria or dysuria  NEUROLOGIC: No dizziness or vertigo, denies headaches  MUSCULOSKELETAL: Denies any muscle or joint pain  SKIN: Denies skin rashes or itching  ENDOCRINE: Denies excessive thirst  Denies intolerance to heat or cold  PSYCHOSOCIAL: Denies depression or anxiety  Denies any recent memory loss  Historical Information   History reviewed  No pertinent past medical history    Past Surgical History: Procedure Laterality Date    ECTOPIC PREGNANCY SURGERY       Social History   Social History     Substance and Sexual Activity   Alcohol Use Yes    Comment: socially     Social History     Substance and Sexual Activity   Drug Use Yes    Types: Marijuana     Social History     Tobacco Use   Smoking Status Never Smoker   Smokeless Tobacco Never Used     History reviewed  No pertinent family history  Meds/Allergies     No medications prior to admission  Current Facility-Administered Medications   Medication Dose Route Frequency    acetaminophen (TYLENOL) tablet 650 mg  650 mg Oral Q6H PRN    enoxaparin (LOVENOX) subcutaneous injection 40 mg  40 mg Subcutaneous Daily    ketorolac (TORADOL) injection 15 mg  15 mg Intravenous Q6H PRN    ondansetron (ZOFRAN) injection 4 mg  4 mg Intravenous Q6H PRN    oxyCODONE (ROXICODONE) IR tablet 5 mg  5 mg Oral Q4H PRN    sodium chloride 0 9 % infusion  125 mL/hr Intravenous Continuous       Allergies   Allergen Reactions    Amoxicillin Facial Swelling           Objective     Blood pressure 106/73, pulse 77, temperature 98 7 °F (37 1 °C), temperature source Oral, resp  rate 18, weight 126 kg (276 lb 10 8 oz), last menstrual period 03/25/2021, SpO2 99 %  Body mass index is 47 49 kg/m²  No intake or output data in the 24 hours ending 04/04/21 0949      PHYSICAL EXAM:      General Appearance:   Alert, cooperative, no distress; obese   HEENT:   Normocephalic, atraumatic, anicteric  Neck:  Supple, symmetrical, trachea midline   Lungs:   Clear to auscultation bilaterally; no rales, rhonchi or wheezing; respirations unlabored    Heart[de-identified]   Regular rate and rhythm; no murmur, rub, or gallop     Abdomen:   Soft, RUQ slightly tender, no rigidity or guarding, non-distended; normal bowel sounds; no masses, no organomegaly    Genitalia:   Deferred    Rectal:   Deferred    Extremities:  No cyanosis, clubbing or edema    Pulses:  2+ and symmetric all extremities    Skin:  No jaundice, rashes, or lesions    Lymph nodes:  No palpable cervical lymphadenopathy        Lab Results:   Admission on 04/04/2021   Component Date Value    WBC 04/04/2021 8 11     RBC 04/04/2021 4 26     Hemoglobin 04/04/2021 12 2     Hematocrit 04/04/2021 38 8     MCV 04/04/2021 91     MCH 04/04/2021 28 6     MCHC 04/04/2021 31 4     RDW 04/04/2021 14 2     MPV 04/04/2021 10 0     Platelets 18/67/3542 294     nRBC 04/04/2021 0     Neutrophils Relative 04/04/2021 85*    Immat GRANS % 04/04/2021 0     Lymphocytes Relative 04/04/2021 10*    Monocytes Relative 04/04/2021 5     Eosinophils Relative 04/04/2021 0     Basophils Relative 04/04/2021 0     Neutrophils Absolute 04/04/2021 6 78     Immature Grans Absolute 04/04/2021 0 02     Lymphocytes Absolute 04/04/2021 0 83     Monocytes Absolute 04/04/2021 0 44     Eosinophils Absolute 04/04/2021 0 01     Basophils Absolute 04/04/2021 0 03     Protime 04/04/2021 12 8     INR 04/04/2021 0 96     PTT 04/04/2021 24     Sodium 04/04/2021 142     Potassium 04/04/2021 3 9     Chloride 04/04/2021 107     CO2 04/04/2021 24     ANION GAP 04/04/2021 11     BUN 04/04/2021 12     Creatinine 04/04/2021 0 90     Glucose 04/04/2021 131     Calcium 04/04/2021 8 9     Corrected Calcium 04/04/2021 9 5     AST 04/04/2021 137*    ALT 04/04/2021 77     Alkaline Phosphatase 04/04/2021 119*    Total Protein 04/04/2021 7 5     Albumin 04/04/2021 3 3*    Total Bilirubin 04/04/2021 0 78     eGFR 04/04/2021 76     Lipase 04/04/2021 220     LACTIC ACID 04/04/2021 1 2     Total CK 04/04/2021 45     D-Dimer, Quant 04/04/2021 0 44     Troponin I 04/04/2021 <0 02     Preg, Serum 04/04/2021 Negative     TSH 3RD GENERATON 04/04/2021 2 037     Color, UA 04/04/2021 Yellow     Clarity, UA 04/04/2021 Clear     Specific Gravity, UA 04/04/2021 1 010     pH, UA 04/04/2021 5 5     Leukocytes, UA 04/04/2021 Negative     Nitrite, UA 04/04/2021 Negative     Protein, UA 04/04/2021 Negative     Glucose, UA 04/04/2021 Negative     Ketones, UA 04/04/2021 Negative     Urobilinogen, UA 04/04/2021 0 2     Bilirubin, UA 04/04/2021 Negative     Blood, UA 04/04/2021 Negative        Imaging Studies: I have personally reviewed pertinent imaging studies

## 2021-04-05 ENCOUNTER — APPOINTMENT (INPATIENT)
Dept: MRI IMAGING | Facility: HOSPITAL | Age: 46
DRG: 417 | End: 2021-04-05
Payer: COMMERCIAL

## 2021-04-05 ENCOUNTER — ANESTHESIA EVENT (INPATIENT)
Dept: PERIOP | Facility: HOSPITAL | Age: 46
DRG: 417 | End: 2021-04-05
Payer: COMMERCIAL

## 2021-04-05 PROBLEM — K80.50 CHOLEDOCHOLITHIASIS: Status: ACTIVE | Noted: 2021-04-05

## 2021-04-05 LAB
ALBUMIN SERPL BCP-MCNC: 2.7 G/DL (ref 3.5–5)
ALP SERPL-CCNC: 123 U/L (ref 46–116)
ALT SERPL W P-5'-P-CCNC: 174 U/L (ref 12–78)
ANION GAP SERPL CALCULATED.3IONS-SCNC: 8 MMOL/L (ref 4–13)
AST SERPL W P-5'-P-CCNC: 119 U/L (ref 5–45)
BILIRUB SERPL-MCNC: 0.5 MG/DL (ref 0.2–1)
BUN SERPL-MCNC: 4 MG/DL (ref 5–25)
CALCIUM ALBUM COR SERPL-MCNC: 8.8 MG/DL (ref 8.3–10.1)
CALCIUM SERPL-MCNC: 7.8 MG/DL (ref 8.3–10.1)
CHLORIDE SERPL-SCNC: 109 MMOL/L (ref 100–108)
CO2 SERPL-SCNC: 22 MMOL/L (ref 21–32)
CREAT SERPL-MCNC: 0.74 MG/DL (ref 0.6–1.3)
ERYTHROCYTE [DISTWIDTH] IN BLOOD BY AUTOMATED COUNT: 14.6 % (ref 11.6–15.1)
GFR SERPL CREATININE-BSD FRML MDRD: 97 ML/MIN/1.73SQ M
GLUCOSE P FAST SERPL-MCNC: 105 MG/DL (ref 65–99)
GLUCOSE SERPL-MCNC: 105 MG/DL (ref 65–140)
HCT VFR BLD AUTO: 34.9 % (ref 34.8–46.1)
HGB BLD-MCNC: 10.8 G/DL (ref 11.5–15.4)
LIPASE SERPL-CCNC: 250 U/L (ref 73–393)
MCH RBC QN AUTO: 28.7 PG (ref 26.8–34.3)
MCHC RBC AUTO-ENTMCNC: 30.9 G/DL (ref 31.4–37.4)
MCV RBC AUTO: 93 FL (ref 82–98)
PLATELET # BLD AUTO: 243 THOUSANDS/UL (ref 149–390)
PMV BLD AUTO: 10.3 FL (ref 8.9–12.7)
POTASSIUM SERPL-SCNC: 3.9 MMOL/L (ref 3.5–5.3)
PROT SERPL-MCNC: 6.2 G/DL (ref 6.4–8.2)
RBC # BLD AUTO: 3.76 MILLION/UL (ref 3.81–5.12)
SODIUM SERPL-SCNC: 139 MMOL/L (ref 136–145)
WBC # BLD AUTO: 4.75 THOUSAND/UL (ref 4.31–10.16)

## 2021-04-05 PROCEDURE — 83690 ASSAY OF LIPASE: CPT | Performed by: PHYSICIAN ASSISTANT

## 2021-04-05 PROCEDURE — 74181 MRI ABDOMEN W/O CONTRAST: CPT

## 2021-04-05 PROCEDURE — 99232 SBSQ HOSP IP/OBS MODERATE 35: CPT | Performed by: PHYSICIAN ASSISTANT

## 2021-04-05 PROCEDURE — 85027 COMPLETE CBC AUTOMATED: CPT | Performed by: SURGERY

## 2021-04-05 PROCEDURE — 80053 COMPREHEN METABOLIC PANEL: CPT | Performed by: SURGERY

## 2021-04-05 PROCEDURE — G1004 CDSM NDSC: HCPCS

## 2021-04-05 PROCEDURE — 99232 SBSQ HOSP IP/OBS MODERATE 35: CPT | Performed by: SURGERY

## 2021-04-05 RX ORDER — SODIUM CHLORIDE 9 MG/ML
100 INJECTION, SOLUTION INTRAVENOUS CONTINUOUS
Status: DISCONTINUED | OUTPATIENT
Start: 2021-04-06 | End: 2021-04-06

## 2021-04-05 RX ADMIN — METRONIDAZOLE 500 MG: 500 INJECTION, SOLUTION INTRAVENOUS at 10:51

## 2021-04-05 RX ADMIN — CEFAZOLIN SODIUM 1000 MG: 1 SOLUTION INTRAVENOUS at 18:11

## 2021-04-05 RX ADMIN — ENOXAPARIN SODIUM 40 MG: 40 INJECTION SUBCUTANEOUS at 13:54

## 2021-04-05 RX ADMIN — DEXTROSE, SODIUM CHLORIDE, AND POTASSIUM CHLORIDE 125 ML/HR: 5; .45; .15 INJECTION INTRAVENOUS at 09:19

## 2021-04-05 RX ADMIN — CEFAZOLIN SODIUM 1000 MG: 1 SOLUTION INTRAVENOUS at 10:51

## 2021-04-05 RX ADMIN — CEFAZOLIN SODIUM 1000 MG: 1 SOLUTION INTRAVENOUS at 02:08

## 2021-04-05 RX ADMIN — METRONIDAZOLE 500 MG: 500 INJECTION, SOLUTION INTRAVENOUS at 18:02

## 2021-04-05 RX ADMIN — METRONIDAZOLE 500 MG: 500 INJECTION, SOLUTION INTRAVENOUS at 02:58

## 2021-04-05 NOTE — PROGRESS NOTES
Progress Note - Ilan Storm 50 y o  female MRN: [id number]    Unit/Bed#: S -01 Encounter: [id number]        Subjective:   Patient reports feeling relatively well at this time, denies any abdominal pain at present, no fevers or chills  Objective:     Vitals: Blood pressure 100/58, pulse 76, temperature 98 4 °F (36 9 °C), temperature source Oral, resp  rate 18, weight 126 kg (276 lb 10 8 oz), last menstrual period 03/25/2021, SpO2 97 %  ,Body mass index is 47 49 kg/m²  Intake/Output Summary (Last 24 hours) at 4/5/2021 1157  Last data filed at 4/5/2021 0900  Gross per 24 hour   Intake 2868 ml   Output 850 ml   Net 2018 ml       Physical Exam:   General appearance: alert, appears stated age and cooperative  Lungs: clear to auscultation bilaterally, no labored breathing/accessory muscle use  Heart: regular rate and rhythm, S1, S2 normal, no murmur, click, rub or gallop  Abdomen: soft, obese, non-tender; bowel sounds normal; no masses,  no organomegaly  Extremities: no edema    Invasive Devices     Peripheral Intravenous Line            Peripheral IV 04/05/21 Right Arm less than 1 day                Lab, Imaging and other studies: I have personally reviewed pertinent reports      Admission on 04/04/2021   Component Date Value    WBC 04/04/2021 8 11     RBC 04/04/2021 4 26     Hemoglobin 04/04/2021 12 2     Hematocrit 04/04/2021 38 8     MCV 04/04/2021 91     MCH 04/04/2021 28 6     MCHC 04/04/2021 31 4     RDW 04/04/2021 14 2     MPV 04/04/2021 10 0     Platelets 21/34/1446 294     nRBC 04/04/2021 0     Neutrophils Relative 04/04/2021 85*    Immat GRANS % 04/04/2021 0     Lymphocytes Relative 04/04/2021 10*    Monocytes Relative 04/04/2021 5     Eosinophils Relative 04/04/2021 0     Basophils Relative 04/04/2021 0     Neutrophils Absolute 04/04/2021 6 78     Immature Grans Absolute 04/04/2021 0 02     Lymphocytes Absolute 04/04/2021 0 83     Monocytes Absolute 04/04/2021 0 44     Eosinophils Absolute 04/04/2021 0 01     Basophils Absolute 04/04/2021 0 03     Protime 04/04/2021 12 8     INR 04/04/2021 0 96     PTT 04/04/2021 24     Sodium 04/04/2021 142     Potassium 04/04/2021 3 9     Chloride 04/04/2021 107     CO2 04/04/2021 24     ANION GAP 04/04/2021 11     BUN 04/04/2021 12     Creatinine 04/04/2021 0 90     Glucose 04/04/2021 131     Calcium 04/04/2021 8 9     Corrected Calcium 04/04/2021 9 5     AST 04/04/2021 137*    ALT 04/04/2021 77     Alkaline Phosphatase 04/04/2021 119*    Total Protein 04/04/2021 7 5     Albumin 04/04/2021 3 3*    Total Bilirubin 04/04/2021 0 78     eGFR 04/04/2021 76     Lipase 04/04/2021 220     LACTIC ACID 04/04/2021 1 2     Total CK 04/04/2021 45     D-Dimer, Quant 04/04/2021 0 44     Troponin I 04/04/2021 <0 02     Preg, Serum 04/04/2021 Negative     TSH 3RD GENERATON 04/04/2021 2 037     Color, UA 04/04/2021 Yellow     Clarity, UA 04/04/2021 Clear     Specific Gravity, UA 04/04/2021 1 010     pH, UA 04/04/2021 5 5     Leukocytes, UA 04/04/2021 Negative     Nitrite, UA 04/04/2021 Negative     Protein, UA 04/04/2021 Negative     Glucose, UA 04/04/2021 Negative     Ketones, UA 04/04/2021 Negative     Urobilinogen, UA 04/04/2021 0 2     Bilirubin, UA 04/04/2021 Negative     Blood, UA 04/04/2021 Negative     Sodium 04/04/2021 142     Potassium 04/04/2021 4 0     Chloride 04/04/2021 110*    CO2 04/04/2021 24     ANION GAP 04/04/2021 8     BUN 04/04/2021 9     Creatinine 04/04/2021 0 83     Glucose 04/04/2021 88     Calcium 04/04/2021 8 0*    Corrected Calcium 04/04/2021 8 8     AST 04/04/2021 305*    ALT 04/04/2021 199*    Alkaline Phosphatase 04/04/2021 122*    Total Protein 04/04/2021 7 0     Albumin 04/04/2021 3 0*    Total Bilirubin 04/04/2021 1 23*    eGFR 04/04/2021 84     WBC 04/04/2021 6 07     RBC 04/04/2021 4 14     Hemoglobin 04/04/2021 11 9     Hematocrit 04/04/2021 38 1     MCV 04/04/2021 92     MCH 04/04/2021 28 7     MCHC 04/04/2021 31 2*    RDW 04/04/2021 14 1     MPV 04/04/2021 9 8     Platelets 41/01/6164 285     nRBC 04/04/2021 0     Neutrophils Relative 04/04/2021 73     Immat GRANS % 04/04/2021 0     Lymphocytes Relative 04/04/2021 18     Monocytes Relative 04/04/2021 9     Eosinophils Relative 04/04/2021 0     Basophils Relative 04/04/2021 0     Neutrophils Absolute 04/04/2021 4 42     Immature Grans Absolute 04/04/2021 0 02     Lymphocytes Absolute 04/04/2021 1 08     Monocytes Absolute 04/04/2021 0 53     Eosinophils Absolute 04/04/2021 0 01     Basophils Absolute 04/04/2021 0 01     Ventricular Rate 04/04/2021 99     Atrial Rate 04/04/2021 99     MO Interval 04/04/2021 178     QRSD Interval 04/04/2021 72     QT Interval 04/04/2021 328     QTC Interval 04/04/2021 420     P Axis 04/04/2021 57     QRS Axis 04/04/2021 -12     T Wave Axis 04/04/2021 35     Lipase 04/04/2021 7,021*    SARS-CoV-2 04/04/2021 Negative     INFLUENZA A PCR 04/04/2021 Negative     INFLUENZA B PCR 04/04/2021 Negative     RSV PCR 04/04/2021 Negative     WBC 04/05/2021 4 75     RBC 04/05/2021 3 76*    Hemoglobin 04/05/2021 10 8*    Hematocrit 04/05/2021 34 9     MCV 04/05/2021 93     MCH 04/05/2021 28 7     MCHC 04/05/2021 30 9*    RDW 04/05/2021 14 6     Platelets 84/46/2885 243     MPV 04/05/2021 10 3     Sodium 04/05/2021 139     Potassium 04/05/2021 3 9     Chloride 04/05/2021 109*    CO2 04/05/2021 22     ANION GAP 04/05/2021 8     BUN 04/05/2021 4*    Creatinine 04/05/2021 0 74     Glucose 04/05/2021 105     Glucose, Fasting 04/05/2021 105*    Calcium 04/05/2021 7 8*    Corrected Calcium 04/05/2021 8 8     AST 04/05/2021 119*    ALT 04/05/2021 174*    Alkaline Phosphatase 04/05/2021 123*    Total Protein 04/05/2021 6 2*    Albumin 04/05/2021 2 7*    Total Bilirubin 04/05/2021 0 50     eGFR 04/05/2021 97     Lipase 04/05/2021 250      Results for Chip Soto (MRN [id number]) as of 4/5/2021 11:57   Ref   Range 4/4/2021 02:40 4/4/2021 09:44 4/4/2021 20:40 4/5/2021 05:59   Sodium Latest Ref Range: 136 - 145 mmol/L  142  139   Potassium Latest Ref Range: 3 5 - 5 3 mmol/L  4 0  3 9   Chloride Latest Ref Range: 100 - 108 mmol/L  110 (H)  109 (H)   CO2 Latest Ref Range: 21 - 32 mmol/L  24  22   Anion Gap Latest Ref Range: 4 - 13 mmol/L  8  8   BUN Latest Ref Range: 5 - 25 mg/dL  9  4 (L)   Creatinine Latest Ref Range: 0 60 - 1 30 mg/dL  0 83  0 74   Glucose, Random Latest Ref Range: 65 - 140 mg/dL  88  105   GLUCOSE FASTING Latest Ref Range: 65 - 99 mg/dL    105 (H)   Calcium Latest Ref Range: 8 3 - 10 1 mg/dL  8 0 (L)  7 8 (L)   CORRECTED CALCIUM Latest Ref Range: 8 3 - 10 1 mg/dL  8 8  8 8   AST Latest Ref Range: 5 - 45 U/L  305 (H)  119 (H)   ALT Latest Ref Range: 12 - 78 U/L  199 (H)  174 (H)   Alkaline Phosphatase Latest Ref Range: 46 - 116 U/L  122 (H)  123 (H)   Total Protein Latest Ref Range: 6 4 - 8 2 g/dL  7 0  6 2 (L)   Albumin Latest Ref Range: 3 5 - 5 0 g/dL  3 0 (L)  2 7 (L)   TOTAL BILIRUBIN Latest Ref Range: 0 20 - 1 00 mg/dL  1 23 (H)  0 50   eGFR Latest Units: ml/min/1 73sq m  84  97   Lipase Latest Ref Range: 73 - 393 u/L  7,021 (H)  250   WBC Latest Ref Range: 4 31 - 10 16 Thousand/uL  6 07  4 75   Red Blood Cell Count Latest Ref Range: 3 81 - 5 12 Million/uL  4 14  3 76 (L)   Hemoglobin Latest Ref Range: 11 5 - 15 4 g/dL  11 9  10 8 (L)   HCT Latest Ref Range: 34 8 - 46 1 %  38 1  34 9   MCV Latest Ref Range: 82 - 98 fL  92  93   MCH Latest Ref Range: 26 8 - 34 3 pg  28 7  28 7   MCHC Latest Ref Range: 31 4 - 37 4 g/dL  31 2 (L)  30 9 (L)   RDW Latest Ref Range: 11 6 - 15 1 %  14 1  14 6   Platelet Count Latest Ref Range: 149 - 390 Thousands/uL  285  243   MPV Latest Ref Range: 8 9 - 12 7 fL  9 8  10 3   nRBC Latest Units: /100 WBCs  0     Neutrophils % Latest Ref Range: 43 - 75 %  73     Immat GRANS % Latest Ref Range: 0 - 2 %  0 Lymphocytes Relative Latest Ref Range: 14 - 44 %  18     Monocytes Relative Latest Ref Range: 4 - 12 %  9     Eosinophils Latest Ref Range: 0 - 6 %  0     Basophils Relative Latest Ref Range: 0 - 1 %  0     Immature Grans Absolute Latest Ref Range: 0 00 - 0 20 Thousand/uL  0 02     Absolute Neutrophils Latest Ref Range: 1 85 - 7 62 Thousands/µL  4 42     Lymphocytes Absolute Latest Ref Range: 0 60 - 4 47 Thousands/µL  1 08     Absolute Monocytes Latest Ref Range: 0 17 - 1 22 Thousand/µL  0 53     Absolute Eosinophils Latest Ref Range: 0 00 - 0 61 Thousand/µL  0 01     Basophils Absolute Latest Ref Range: 0 00 - 0 10 Thousands/µL  0 01     INFLU A PCR Latest Ref Range: Negative    Negative    INFLU B PCR Latest Ref Range: Negative    Negative    RSV PCR Latest Ref Range: Negative    Negative    SARS-COV-2 Latest Ref Range: Negative    Negative    Color, UA Unknown Yellow      Clarity, UA Unknown Clear      SL AMB SPECIFIC GRAVITY_URINE Latest Ref Range: 1 003 - 1 030  1 010      Glucose, UA Latest Ref Range: Negative mg/dl Negative      Ketones, UA Latest Ref Range: Negative mg/dl Negative      Blood, UA Latest Ref Range: Negative  Negative      Nitrite, UA Latest Ref Range: Negative  Negative      Leukocytes, UA Latest Ref Range: Negative  Negative      pH, UA Latest Ref Range: 4 5, 5 0, 5 5, 6 0, 6 5, 7 0, 7 5, 8 0  5 5      POCT URINE PROTEIN Latest Ref Range: Negative mg/dl Negative      Bilirubin, UA Latest Ref Range: Negative  Negative      SL AMB POCT UROBILINOGEN Latest Ref Range: 0 2, 1 0 E U /dl E U /dl 0 2      COVID19, INFLUENZA A/B, RSV PCR, [facility] Unknown   Rpt          Assessment/Plan:    1  Suspected choledocholithiasis with biliary pancreatitis, appears to be clinically improving at this time, lipase down trending, abdomen exam appears benign currently  Liver enzymes down trending    Possible patient may have passed stone at this point    - will defer ERCP for now, check MRI/MRCP and plan for ERCP if there is evidence of retained CBD stone    ERCP at this point may instigate worsening of pancreatitis so in the absence of signs of cholangitis at this time, will continue treatment for pancreatitis    - monitor abdominal exam, temperature and white blood cell count; may advance to clear liquid diet for now and gradually advanced toward low-fat diet as tolerated    - plan for cholecystectomy as per surgery

## 2021-04-05 NOTE — PLAN OF CARE
Problem: PAIN - ADULT  Goal: Verbalizes/displays adequate comfort level or baseline comfort level  Description: Interventions:  - Encourage patient to monitor pain and request assistance  - Assess pain using appropriate pain scale  - Administer analgesics based on type and severity of pain and evaluate response  - Implement non-pharmacological measures as appropriate and evaluate response  - Consider cultural and social influences on pain and pain management  - Notify physician/advanced practitioner if interventions unsuccessful or patient reports new pain  Outcome: Progressing     Problem: INFECTION - ADULT  Goal: Absence or prevention of progression during hospitalization  Description: INTERVENTIONS:  - Assess and monitor for signs and symptoms of infection  - Monitor lab/diagnostic results  - Monitor all insertion sites, i e  indwelling lines, tubes, and drains  - Monitor endotracheal if appropriate and nasal secretions for changes in amount and color  - [place] appropriate cooling/warming therapies per order  - Administer medications as ordered  - Instruct and encourage patient and family to use good hand hygiene technique  - Identify and instruct in appropriate isolation precautions for identified infection/condition  Outcome: Progressing     Problem: SAFETY ADULT  Goal: Patient will remain free of falls  Description: INTERVENTIONS:  - Assess patient frequently for physical needs  -  Identify cognitive and physical deficits and behaviors that affect risk of falls    -  [place] fall precautions as indicated by assessment   - Educate patient/family on patient safety including physical limitations  - Instruct patient to call for assistance with activity based on assessment  - Modify environment to reduce risk of injury  - Consider OT/PT consult to assist with strengthening/mobility  Outcome: Progressing  Goal: Maintain or return to baseline ADL function  Description: INTERVENTIONS:  -  Assess patient's ability to carry out ADLs; assess patient's baseline for ADL function and identify physical deficits which impact ability to perform ADLs (bathing, care of mouth/teeth, toileting, grooming, dressing, etc )  - Assess/evaluate cause of self-care deficits   - Assess range of motion  - Assess patient's mobility; develop plan if impaired  - Assess patient's need for assistive devices and provide as appropriate  - Encourage maximum independence but intervene and supervise when necessary  - Involve family in performance of ADLs  - Assess for home care needs following discharge   - Consider OT consult to assist with ADL evaluation and planning for discharge  - Provide patient education as appropriate  Outcome: Progressing  Goal: Maintain or return mobility status to optimal level  Description: INTERVENTIONS:  - Assess patient's baseline mobility status (ambulation, transfers, stairs, etc )    - Identify cognitive and physical deficits and behaviors that affect mobility  - Identify mobility aids required to assist with transfers and/or ambulation (gait belt, sit-to-stand, lift, walker, cane, etc )  - [place] fall precautions as indicated by assessment  - Record patient progress and toleration of activity level on Mobility SBAR; progress patient to next Phase/Stage  - Instruct patient to call for assistance with activity based on assessment  - Consider rehabilitation consult to assist with strengthening/weightbearing, etc   Outcome: Progressing     Problem: DISCHARGE PLANNING  Goal: Discharge to home or other facility with appropriate resources  Description: INTERVENTIONS:  - Identify barriers to discharge w/patient and caregiver  - Arrange for needed discharge resources and transportation as appropriate  - Identify discharge learning needs (meds, wound care, etc )  - Arrange for interpretive services to assist at discharge as needed  - Refer to Case Management Department for coordinating discharge planning if the patient needs post-hospital services based on physician/advanced practitioner order or complex needs related to functional status, cognitive ability, or social support system  Outcome: Progressing     Problem: Knowledge Deficit  Goal: Patient/family/caregiver demonstrates understanding of disease process, treatment plan, medications, and discharge instructions  Description: Complete learning assessment and assess knowledge base    Interventions:  - Provide teaching at level of understanding  - Provide teaching via preferred learning methods  Outcome: Progressing     Problem: GASTROINTESTINAL - ADULT  Goal: Minimal or absence of nausea and/or vomiting  Description: INTERVENTIONS:  - Administer IV fluids if ordered to ensure adequate hydration  - Maintain NPO status until nausea and vomiting are resolved  - Nasogastric tube if ordered  - Administer ordered antiemetic medications as needed  - Provide nonpharmacologic comfort measures as appropriate  - Advance diet as tolerated, if ordered  - Consider nutrition services referral to assist patient with adequate nutrition and appropriate food choices  Outcome: Progressing  Goal: Maintains adequate nutritional intake  Description: INTERVENTIONS:  - Monitor percentage of each meal consumed  - Identify factors contributing to decreased intake, treat as appropriate  - Assist with meals as needed  - Monitor I&O, weight, and lab values if indicated  - Obtain nutrition services referral as needed  Outcome: Progressing

## 2021-04-05 NOTE — PLAN OF CARE
Problem: PAIN - ADULT  Goal: Verbalizes/displays adequate comfort level or baseline comfort level  Description: Interventions:  - Encourage patient to monitor pain and request assistance  - Assess pain using appropriate pain scale  - Administer analgesics based on type and severity of pain and evaluate response  - Implement non-pharmacological measures as appropriate and evaluate response  - Consider cultural and social influences on pain and pain management  - Notify physician/advanced practitioner if interventions unsuccessful or patient reports new pain  4/4/2021 2002 by Kelsi Mccauley RN  Outcome: Progressing  4/4/2021 0625 by Kelsi Mccauley RN  Outcome: Progressing     Problem: INFECTION - ADULT  Goal: Absence or prevention of progression during hospitalization  Description: INTERVENTIONS:  - Assess and monitor for signs and symptoms of infection  - Monitor lab/diagnostic results  - Monitor all insertion sites, i e  indwelling lines, tubes, and drains  - Monitor endotracheal if appropriate and nasal secretions for changes in amount and color  - [place] appropriate cooling/warming therapies per order  - Administer medications as ordered  - Instruct and encourage patient and family to use good hand hygiene technique  - Identify and instruct in appropriate isolation precautions for identified infection/condition  4/4/2021 2002 by Kelsi Mccauley RN  Outcome: Progressing  4/4/2021 0625 by Kelsi Mccauley RN  Outcome: Progressing  Goal: Absence of fever/infection during neutropenic period  Description: INTERVENTIONS:  - Monitor WBC    4/4/2021 2002 by Kelsi Mccauley RN  Outcome: Progressing  4/4/2021 0625 by Kelsi Mccauley RN  Outcome: Progressing     Problem: SAFETY ADULT  Goal: Patient will remain free of falls  Description: INTERVENTIONS:  - Assess patient frequently for physical needs  -  Identify cognitive and physical deficits and behaviors that affect risk of falls    -  [place] fall precautions as indicated by assessment   - Educate patient/family on patient safety including physical limitations  - Instruct patient to call for assistance with activity based on assessment  - Modify environment to reduce risk of injury  - Consider OT/PT consult to assist with strengthening/mobility  4/4/2021 2002 by Mukesh Dueñas RN  Outcome: Progressing  4/4/2021 0625 by Mukesh Dueñas RN  Outcome: Progressing  Goal: Maintain or return to baseline ADL function  Description: INTERVENTIONS:  -  Assess patient's ability to carry out ADLs; assess patient's baseline for ADL function and identify physical deficits which impact ability to perform ADLs (bathing, care of mouth/teeth, toileting, grooming, dressing, etc )  - Assess/evaluate cause of self-care deficits   - Assess range of motion  - Assess patient's mobility; develop plan if impaired  - Assess patient's need for assistive devices and provide as appropriate  - Encourage maximum independence but intervene and supervise when necessary  - Involve family in performance of ADLs  - Assess for home care needs following discharge   - Consider OT consult to assist with ADL evaluation and planning for discharge  - Provide patient education as appropriate  4/4/2021 2002 by Mukesh Dueñas RN  Outcome: Progressing  4/4/2021 0625 by Mukesh Dueñas RN  Outcome: Progressing  Goal: Maintain or return mobility status to optimal level  Description: INTERVENTIONS:  - Assess patient's baseline mobility status (ambulation, transfers, stairs, etc )    - Identify cognitive and physical deficits and behaviors that affect mobility  - Identify mobility aids required to assist with transfers and/or ambulation (gait belt, sit-to-stand, lift, walker, cane, etc )  - [place] fall precautions as indicated by assessment  - Record patient progress and toleration of activity level on Mobility SBAR; progress patient to next Phase/Stage  - Instruct patient to call for assistance with activity based on assessment  - Consider rehabilitation consult to assist with strengthening/weightbearing, etc   4/4/2021 2002 by David Preston RN  Outcome: Progressing  4/4/2021 0625 by David Preston RN  Outcome: Progressing     Problem: DISCHARGE PLANNING  Goal: Discharge to home or other facility with appropriate resources  Description: INTERVENTIONS:  - Identify barriers to discharge w/patient and caregiver  - Arrange for needed discharge resources and transportation as appropriate  - Identify discharge learning needs (meds, wound care, etc )  - Arrange for interpretive services to assist at discharge as needed  - Refer to Case Management Department for coordinating discharge planning if the patient needs post-hospital services based on physician/advanced practitioner order or complex needs related to functional status, cognitive ability, or social support system  4/4/2021 2002 by David Preston RN  Outcome: Progressing  4/4/2021 0625 by David Preston RN  Outcome: Progressing     Problem: Knowledge Deficit  Goal: Patient/family/caregiver demonstrates understanding of disease process, treatment plan, medications, and discharge instructions  Description: Complete learning assessment and assess knowledge base    Interventions:  - Provide teaching at level of understanding  - Provide teaching via preferred learning methods  4/4/2021 2002 by David Preston RN  Outcome: Progressing  4/4/2021 0625 by David Preston RN  Outcome: Progressing     Problem: GASTROINTESTINAL - ADULT  Goal: Minimal or absence of nausea and/or vomiting  Description: INTERVENTIONS:  - Administer IV fluids if ordered to ensure adequate hydration  - Maintain NPO status until nausea and vomiting are resolved  - Nasogastric tube if ordered  - Administer ordered antiemetic medications as needed  - Provide nonpharmacologic comfort measures as appropriate  - Advance diet as tolerated, if ordered  - Consider nutrition services referral to assist patient with adequate nutrition and appropriate food choices  4/4/2021 2002 by Fabricio Calhoun RN  Outcome: Progressing  4/4/2021 0625 by Fabricio Calhoun RN  Outcome: Progressing  Goal: Maintains adequate nutritional intake  Description: INTERVENTIONS:  - Monitor percentage of each meal consumed  - Identify factors contributing to decreased intake, treat as appropriate  - Assist with meals as needed  - Monitor I&O, weight, and lab values if indicated  - Obtain nutrition services referral as needed  4/4/2021 2002 by Fabricio Calhoun RN  Outcome: Progressing  4/4/2021 0625 by Fabricio Calhoun RN  Outcome: Progressing

## 2021-04-05 NOTE — PROGRESS NOTES
Progress Note - General Surgery   Sapna Hands 50 y o  female MRN: [id number]  Unit/Bed#: S -01 Encounter: [id number]    Assessment:  50 F p/w choledocholithiasis/ biliary pancreatitis, planned for ERCP today    VSS, afebrile     2 35L UOP    Abdominal examination stable, RUQ tenderness        Plan:  -ERCP per GI today  -Maintain NPO  -empiric ancef/flagyl on  -ambulation out of bed  -DVT PPx      Subjective/Objective       Subjective: No acute events overnight     Objective:     Blood pressure 97/55, pulse 72, temperature 98 1 °F (36 7 °C), temperature source Oral, resp  rate 18, weight 126 kg (276 lb 10 8 oz), last menstrual period 03/25/2021, SpO2 98 %  ,Body mass index is 47 49 kg/m²        Intake/Output Summary (Last 24 hours) at 4/5/2021 1223  Last data filed at 4/5/2021 0100  Gross per 24 hour   Intake 2868 ml   Output 1350 ml   Net 1518 ml       Invasive Devices     Peripheral Intravenous Line            Peripheral IV 04/04/21 Right Antecubital 1 day                Physical Exam:   Geenral: obese, NAD  HEENT: nromocephalic, atraumatic  CV: RRR  Resp: no distress  Abdomen: soft, non-distended, slightly tender RUQ  Extremities: No c/c/e  Neuro: awake, alert  Skin: warm, dry    Lab, Imaging and other studies:pending    VTE Pharmacologic Prophylaxis: Heparin  VTE Mechanical Prophylaxis: sequential compression device

## 2021-04-05 NOTE — ANESTHESIA PREPROCEDURE EVALUATION
Procedure:  CHOLECYSTECTOMY LAPAROSCOPIC (N/A Abdomen)    Relevant Problems   No relevant active problems        Physical Exam    Airway    Mallampati score: I  TM Distance: >3 FB  Neck ROM: full     Dental   No notable dental hx     Cardiovascular      Pulmonary      Other Findings        Anesthesia Plan  ASA Score- 3     Anesthesia Type- general with ASA Monitors  Additional Monitors:   Airway Plan: ETT  Plan Factors-Exercise tolerance (METS): >4 METS  Chart reviewed  EKG reviewed  Patient is not a current smoker  Patient not instructed to abstain from smoking on day of procedure  Patient did not smoke on day of surgery  Induction- intravenous  Postoperative Plan-     Informed Consent- Anesthetic plan and risks discussed with patient  I personally reviewed this patient with the CRNA  Discussed and agreed on the Anesthesia Plan with the CRNA             Lab Results   Component Value Date    GLUC 79 04/06/2021    GLUF 105 (H) 04/05/2021     (H) 04/06/2021    AST 50 (H) 04/06/2021    BUN 2 (L) 04/06/2021    CALCIUM 7 7 (L) 04/06/2021     (H) 04/06/2021    CO2 19 (L) 04/06/2021    CREATININE 0 56 (L) 04/06/2021    INR 0 96 04/04/2021    HCT 37 3 04/06/2021    HGB 10 8 (L) 04/06/2021     04/06/2021    K 4 0 04/06/2021    WBC 4 52 04/06/2021

## 2021-04-05 NOTE — UTILIZATION REVIEW
Continued Stay Review    Admission Orders (From admission, onward)     Ordered        04/05/21 1601  Inpatient Admission  Once         04/04/21 0445  Place in Observation  Once                   4/05   CHANGED to INPT status  Due to  ongong treatment for  Acute pancreatitis and cholecystitis    Inpatient Admission Once     Question Answer   Level of Care Med Surg   Estimated length of stay More than 2 Midnights   Certification I certify that inpatient services are medically necessary for this patient for a duration of greater than two midnights  See H&P and MD Progress Notes for additional information about the patient's course of treatment  HPI:45 y o  female initially admitted on  4/4  @  [id number] w/choledocholithiasis/ biliary pancreatitis  Several day h/o  Progressive epigastric discomfort;  Tried Pepcid and Kamryn w/o relief  , alkaline phosphatase 119;  8 hrs later ,  ,  Alk phos 122 and Lipast 7021  Imaging confirms intrahepatic biliary ductal dilatation and questionable stone in the common bile duct concerning for choledocholithiasis on imaging and possible cholecystitis  4/4  Plan for ERCP with possible sphincterotomy tomorrow for further evaluation and removal of stones  Okay for clear liquid diet, NPO past midnight  Continue antibiotics to cover for cholangitis due to obstruction  Monitor CBC, CMP, lipase    4/5    GI CONSULT   Suspected choledocholithiasis with biliary pancreatitis, appears to be clinically improving at this time, lipase down trending    Possible patient may have passed stone  DEFER ERCP for now,  check MRI/MRCP and plan for ERCP if there is evidence of retained CBD stone  ERCP at this point may instigate worsening of pancreatitis so in the absence of signs of cholangitis at this time, will continue treatment for pancreatitis       plan for cholecystectomy  (tomorrow)  per surgery     Pertinent Labs/Diagnostic Results:     4/4  US GB:  1   Common bile duct is dilated measuring up to 9 mm as well as mild intrahepatic biliary ductal dilatation   7 mm echogenic focus in the distal CBD in keeping with choledocholithiasis   Cholelithiasis with mild gallbladder wall thickening, may be   secondary to choledocholithiasis with early acute cholecystitis not excluded in the appropriate clinical setting   No pericholecystic fluid or sonographic Astorga's sign  2   Apparent area of outpouching at the gallbladder fundus as noted on the prior CT which contains calculi, with visualization limited on the current study   This can be further characterized on follow-up abdomen MR/MRCP  3   Increased hepatic echogenicity, nonspecific and may be due to hepatic steatosis and/or hepatic parenchymal disease         Results from last 7 days   Lab Units 04/04/21  2040   SARS-COV-2  Negative     Results from last 7 days   Lab Units 04/05/21  0559 04/04/21  0944 04/04/21  0144   WBC Thousand/uL 4 75 6 07 8 11   HEMOGLOBIN g/dL 10 8* 11 9 12 2   HEMATOCRIT % 34 9 38 1 38 8   PLATELETS Thousands/uL 243 285 294   NEUTROS ABS Thousands/µL  --  4 42 6 78         Results from last 7 days   Lab Units 04/05/21 0559 04/04/21  0944 04/04/21  0144   SODIUM mmol/L 139 142 142   POTASSIUM mmol/L 3 9 4 0 3 9   CHLORIDE mmol/L 109* 110* 107   CO2 mmol/L 22 24 24   ANION GAP mmol/L 8 8 11   BUN mg/dL 4* 9 12   CREATININE mg/dL 0 74 0 83 0 90   EGFR ml/min/1 73sq m 97 84 76   CALCIUM mg/dL 7 8* 8 0* 8 9     Results from last 7 days   Lab Units 04/05/21 0559 04/04/21  0944 04/04/21  0144   AST U/L 119* 305* 137*   ALT U/L 174* 199* 77   ALK PHOS U/L 123* 122* 119*   TOTAL PROTEIN g/dL 6 2* 7 0 7 5   ALBUMIN g/dL 2 7* 3 0* 3 3*   TOTAL BILIRUBIN mg/dL 0 50 1 23* 0 78         Results from last 7 days   Lab Units 04/05/21  0559 04/04/21  0944 04/04/21  0144   GLUCOSE RANDOM mg/dL 105 88 131     Results from last 7 days   Lab Units 04/04/21  0144   CK TOTAL U/L 45     Results from last 7 days Lab Units 04/04/21  0144   TROPONIN I ng/mL <0 02     Results from last 7 days   Lab Units 04/04/21  0144   D-DIMER QUANTITATIVE ug/ml FEU 0 44     Results from last 7 days   Lab Units 04/04/21  0144   PROTIME seconds 12 8   INR  0 96   PTT seconds 24     Results from last 7 days   Lab Units 04/04/21  0144   TSH 3RD GENERATON uIU/mL 2 037         Results from last 7 days   Lab Units 04/04/21  0144   LACTIC ACID mmol/L 1 2     Results from last 7 days   Lab Units 04/05/21  0559 04/04/21  0944 04/04/21  0144   LIPASE u/L 250 7,021* 220     Results from last 7 days   Lab Units 04/04/21  0240   CLARITY UA  Clear   COLOR UA  Yellow   SPEC GRAV UA  1 010   PH UA  5 5   GLUCOSE UA mg/dl Negative   KETONES UA mg/dl Negative   BLOOD UA  Negative   PROTEIN UA mg/dl Negative   NITRITE UA  Negative   BILIRUBIN UA  Negative   UROBILINOGEN UA E U /dl 0 2   LEUKOCYTES UA  Negative     Results from last 7 days   Lab Units 04/04/21  2040   INFLUENZA A PCR  Negative   INFLUENZA B PCR  Negative   RSV PCR  Negative     Vital Signs:   04/05/21 0724  98 4 °F (36 9 °C)  76  18  100/58  73  97 %  None (Room air)  Lying   04/04/21 2155  98 1 °F (36 7 °C)  72  18  97/55  --  98 %  None (Room air)  Lying   04/04/21 1505  98 °F (36 7 °C)  69  17  90/60  --  98 %  None (Room air)  Lying         Medications:   Scheduled Medications:  cefazolin, 1,000 mg, Intravenous, Q8H  enoxaparin, 40 mg, Subcutaneous, Daily  metroNIDAZOLE, 500 mg, Intravenous, Q8H      Continuous IV Infusions:  dextrose 5 % and sodium chloride 0 45 % with KCl 20 mEq/L, 125 mL/hr, Intravenous, Continuous      PRN Meds:  acetaminophen, 650 mg, Oral, Q6H PRN  ketorolac, 15 mg, Intravenous, Q6H PRN  ondansetron, 4 mg, Intravenous, Q6H PRN  oxyCODONE, 5 mg, Oral, Q4H PRN        Discharge Plan:   d    Network Utilization Review Department  ATTENTION: Please call with any questions or concerns to [phone] and carefully listen to the prompts so that you are directed to the right person  All voicemails are confidential   Elroy Matson all requests for admission clinical reviews, approved or denied determinations and any other requests to dedicated fax number below belonging to the campus where the patient is receiving treatment   List of dedicated fax numbers for the Facilities:  [address] DENIALS (Administrative/Medical Necessity) [phone]   [address] (Maternity/NICU/Pediatrics) [phone]   [address] [address] [address] [facility] [address] Samantha Bhatt 7255 (  Ila Gonsalez [facility]) [address] [address] [address] Daphney Wong 1481 [address] [place] [address] [phone]

## 2021-04-06 ENCOUNTER — ANESTHESIA (INPATIENT)
Dept: PERIOP | Facility: HOSPITAL | Age: 46
DRG: 417 | End: 2021-04-06
Payer: COMMERCIAL

## 2021-04-06 LAB
ABO GROUP BLD: NORMAL
ALBUMIN SERPL BCP-MCNC: 2.6 G/DL (ref 3.5–5)
ALP SERPL-CCNC: 110 U/L (ref 46–116)
ALT SERPL W P-5'-P-CCNC: 107 U/L (ref 12–78)
ANION GAP SERPL CALCULATED.3IONS-SCNC: 12 MMOL/L (ref 4–13)
AST SERPL W P-5'-P-CCNC: 50 U/L (ref 5–45)
BASOPHILS # BLD AUTO: 0.02 THOUSANDS/ΜL (ref 0–0.1)
BASOPHILS NFR BLD AUTO: 0 % (ref 0–1)
BILIRUB SERPL-MCNC: 0.4 MG/DL (ref 0.2–1)
BUN SERPL-MCNC: 2 MG/DL (ref 5–25)
CALCIUM ALBUM COR SERPL-MCNC: 8.8 MG/DL (ref 8.3–10.1)
CALCIUM SERPL-MCNC: 7.7 MG/DL (ref 8.3–10.1)
CHLORIDE SERPL-SCNC: 110 MMOL/L (ref 100–108)
CO2 SERPL-SCNC: 19 MMOL/L (ref 21–32)
CREAT SERPL-MCNC: 0.56 MG/DL (ref 0.6–1.3)
EOSINOPHIL # BLD AUTO: 0.08 THOUSAND/ΜL (ref 0–0.61)
EOSINOPHIL NFR BLD AUTO: 2 % (ref 0–6)
ERYTHROCYTE [DISTWIDTH] IN BLOOD BY AUTOMATED COUNT: 14.6 % (ref 11.6–15.1)
GFR SERPL CREATININE-BSD FRML MDRD: 111 ML/MIN/1.73SQ M
GLUCOSE SERPL-MCNC: 79 MG/DL (ref 65–140)
HCT VFR BLD AUTO: 37.3 % (ref 34.8–46.1)
HGB BLD-MCNC: 10.8 G/DL (ref 11.5–15.4)
IMM GRANULOCYTES # BLD AUTO: 0.01 THOUSAND/UL (ref 0–0.2)
IMM GRANULOCYTES NFR BLD AUTO: 0 % (ref 0–2)
LYMPHOCYTES # BLD AUTO: 1.84 THOUSANDS/ΜL (ref 0.6–4.47)
LYMPHOCYTES NFR BLD AUTO: 41 % (ref 14–44)
MCH RBC QN AUTO: 29.3 PG (ref 26.8–34.3)
MCHC RBC AUTO-ENTMCNC: 29 G/DL (ref 31.4–37.4)
MCV RBC AUTO: 101 FL (ref 82–98)
MONOCYTES # BLD AUTO: 0.39 THOUSAND/ΜL (ref 0.17–1.22)
MONOCYTES NFR BLD AUTO: 9 % (ref 4–12)
NEUTROPHILS # BLD AUTO: 2.18 THOUSANDS/ΜL (ref 1.85–7.62)
NEUTS SEG NFR BLD AUTO: 48 % (ref 43–75)
NRBC BLD AUTO-RTO: 0 /100 WBCS
PLATELET # BLD AUTO: 226 THOUSANDS/UL (ref 149–390)
PMV BLD AUTO: 10.4 FL (ref 8.9–12.7)
POTASSIUM SERPL-SCNC: 4 MMOL/L (ref 3.5–5.3)
PROT SERPL-MCNC: 6.2 G/DL (ref 6.4–8.2)
RBC # BLD AUTO: 3.69 MILLION/UL (ref 3.81–5.12)
RH BLD: NEGATIVE
SODIUM SERPL-SCNC: 141 MMOL/L (ref 136–145)
WBC # BLD AUTO: 4.52 THOUSAND/UL (ref 4.31–10.16)

## 2021-04-06 PROCEDURE — 88304 TISSUE EXAM BY PATHOLOGIST: CPT | Performed by: PATHOLOGY

## 2021-04-06 PROCEDURE — 80053 COMPREHEN METABOLIC PANEL: CPT | Performed by: SURGERY

## 2021-04-06 PROCEDURE — 0FT44ZZ RESECTION OF GALLBLADDER, PERCUTANEOUS ENDOSCOPIC APPROACH: ICD-10-PCS | Performed by: SURGERY

## 2021-04-06 PROCEDURE — 85025 COMPLETE CBC W/AUTO DIFF WBC: CPT | Performed by: SURGERY

## 2021-04-06 PROCEDURE — 47562 LAPAROSCOPIC CHOLECYSTECTOMY: CPT | Performed by: SURGERY

## 2021-04-06 PROCEDURE — 99232 SBSQ HOSP IP/OBS MODERATE 35: CPT | Performed by: SURGERY

## 2021-04-06 PROCEDURE — 99232 SBSQ HOSP IP/OBS MODERATE 35: CPT | Performed by: INTERNAL MEDICINE

## 2021-04-06 RX ORDER — ROCURONIUM BROMIDE 10 MG/ML
INJECTION, SOLUTION INTRAVENOUS AS NEEDED
Status: DISCONTINUED | OUTPATIENT
Start: 2021-04-06 | End: 2021-04-06

## 2021-04-06 RX ORDER — PROPOFOL 10 MG/ML
INJECTION, EMULSION INTRAVENOUS AS NEEDED
Status: DISCONTINUED | OUTPATIENT
Start: 2021-04-06 | End: 2021-04-06

## 2021-04-06 RX ORDER — FENTANYL CITRATE/PF 50 MCG/ML
50 SYRINGE (ML) INJECTION
Status: DISCONTINUED | OUTPATIENT
Start: 2021-04-06 | End: 2021-04-06 | Stop reason: HOSPADM

## 2021-04-06 RX ORDER — ALBUTEROL SULFATE 2.5 MG/3ML
2.5 SOLUTION RESPIRATORY (INHALATION) ONCE AS NEEDED
Status: DISCONTINUED | OUTPATIENT
Start: 2021-04-06 | End: 2021-04-06 | Stop reason: HOSPADM

## 2021-04-06 RX ORDER — BUPIVACAINE HYDROCHLORIDE AND EPINEPHRINE 2.5; 5 MG/ML; UG/ML
INJECTION, SOLUTION EPIDURAL; INFILTRATION; INTRACAUDAL; PERINEURAL AS NEEDED
Status: DISCONTINUED | OUTPATIENT
Start: 2021-04-06 | End: 2021-04-06 | Stop reason: HOSPADM

## 2021-04-06 RX ORDER — FENTANYL CITRATE 50 UG/ML
INJECTION, SOLUTION INTRAMUSCULAR; INTRAVENOUS AS NEEDED
Status: DISCONTINUED | OUTPATIENT
Start: 2021-04-06 | End: 2021-04-06

## 2021-04-06 RX ORDER — OXYCODONE HYDROCHLORIDE 5 MG/1
5 TABLET ORAL EVERY 4 HOURS PRN
Status: DISCONTINUED | OUTPATIENT
Start: 2021-04-06 | End: 2021-04-07 | Stop reason: HOSPADM

## 2021-04-06 RX ORDER — GLYCOPYRROLATE 0.2 MG/ML
INJECTION INTRAMUSCULAR; INTRAVENOUS AS NEEDED
Status: DISCONTINUED | OUTPATIENT
Start: 2021-04-06 | End: 2021-04-06

## 2021-04-06 RX ORDER — ONDANSETRON 2 MG/ML
4 INJECTION INTRAMUSCULAR; INTRAVENOUS ONCE AS NEEDED
Status: COMPLETED | OUTPATIENT
Start: 2021-04-06 | End: 2021-04-06

## 2021-04-06 RX ORDER — ONDANSETRON 2 MG/ML
4 INJECTION INTRAMUSCULAR; INTRAVENOUS EVERY 4 HOURS PRN
Status: DISCONTINUED | OUTPATIENT
Start: 2021-04-06 | End: 2021-04-07 | Stop reason: HOSPADM

## 2021-04-06 RX ORDER — LIDOCAINE HYDROCHLORIDE 10 MG/ML
INJECTION, SOLUTION EPIDURAL; INFILTRATION; INTRACAUDAL; PERINEURAL AS NEEDED
Status: DISCONTINUED | OUTPATIENT
Start: 2021-04-06 | End: 2021-04-06

## 2021-04-06 RX ORDER — MAGNESIUM HYDROXIDE 1200 MG/15ML
LIQUID ORAL AS NEEDED
Status: DISCONTINUED | OUTPATIENT
Start: 2021-04-06 | End: 2021-04-06 | Stop reason: HOSPADM

## 2021-04-06 RX ORDER — ONDANSETRON 2 MG/ML
INJECTION INTRAMUSCULAR; INTRAVENOUS AS NEEDED
Status: DISCONTINUED | OUTPATIENT
Start: 2021-04-06 | End: 2021-04-06

## 2021-04-06 RX ORDER — OXYCODONE HYDROCHLORIDE 5 MG/1
5 TABLET ORAL EVERY 4 HOURS PRN
Qty: 10 TABLET | Refills: 0 | Status: SHIPPED | OUTPATIENT
Start: 2021-04-06 | End: 2021-04-16

## 2021-04-06 RX ORDER — MORPHINE SULFATE 4 MG/ML
4 INJECTION, SOLUTION INTRAMUSCULAR; INTRAVENOUS
Status: DISCONTINUED | OUTPATIENT
Start: 2021-04-06 | End: 2021-04-07 | Stop reason: HOSPADM

## 2021-04-06 RX ORDER — IBUPROFEN 400 MG/1
400 TABLET ORAL EVERY 6 HOURS PRN
Status: DISCONTINUED | OUTPATIENT
Start: 2021-04-06 | End: 2021-04-07 | Stop reason: HOSPADM

## 2021-04-06 RX ORDER — HYDROMORPHONE HCL/PF 1 MG/ML
0.5 SYRINGE (ML) INJECTION
Status: DISCONTINUED | OUTPATIENT
Start: 2021-04-06 | End: 2021-04-06 | Stop reason: HOSPADM

## 2021-04-06 RX ORDER — SODIUM CHLORIDE, SODIUM LACTATE, POTASSIUM CHLORIDE, CALCIUM CHLORIDE 600; 310; 30; 20 MG/100ML; MG/100ML; MG/100ML; MG/100ML
INJECTION, SOLUTION INTRAVENOUS CONTINUOUS PRN
Status: DISCONTINUED | OUTPATIENT
Start: 2021-04-06 | End: 2021-04-06

## 2021-04-06 RX ORDER — KETOROLAC TROMETHAMINE 30 MG/ML
INJECTION, SOLUTION INTRAMUSCULAR; INTRAVENOUS AS NEEDED
Status: DISCONTINUED | OUTPATIENT
Start: 2021-04-06 | End: 2021-04-06

## 2021-04-06 RX ORDER — DEXAMETHASONE SODIUM PHOSPHATE 10 MG/ML
INJECTION, SOLUTION INTRAMUSCULAR; INTRAVENOUS AS NEEDED
Status: DISCONTINUED | OUTPATIENT
Start: 2021-04-06 | End: 2021-04-06

## 2021-04-06 RX ORDER — MIDAZOLAM HYDROCHLORIDE 2 MG/2ML
INJECTION, SOLUTION INTRAMUSCULAR; INTRAVENOUS AS NEEDED
Status: DISCONTINUED | OUTPATIENT
Start: 2021-04-06 | End: 2021-04-06

## 2021-04-06 RX ADMIN — LIDOCAINE HYDROCHLORIDE 50 MG: 10 INJECTION, SOLUTION EPIDURAL; INFILTRATION; INTRACAUDAL at 08:59

## 2021-04-06 RX ADMIN — ONDANSETRON 4 MG: 2 INJECTION INTRAMUSCULAR; INTRAVENOUS at 10:40

## 2021-04-06 RX ADMIN — SODIUM CHLORIDE 100 ML/HR: 0.9 INJECTION, SOLUTION INTRAVENOUS at 00:50

## 2021-04-06 RX ADMIN — FENTANYL CITRATE 50 MCG: 50 INJECTION INTRAMUSCULAR; INTRAVENOUS at 10:35

## 2021-04-06 RX ADMIN — HYDROMORPHONE HYDROCHLORIDE 0.5 MG: 1 INJECTION, SOLUTION INTRAMUSCULAR; INTRAVENOUS; SUBCUTANEOUS at 10:55

## 2021-04-06 RX ADMIN — ACETAMINOPHEN 650 MG: 325 TABLET, FILM COATED ORAL at 22:22

## 2021-04-06 RX ADMIN — ONDANSETRON 4 MG: 2 INJECTION INTRAMUSCULAR; INTRAVENOUS at 09:41

## 2021-04-06 RX ADMIN — SODIUM CHLORIDE, SODIUM LACTATE, POTASSIUM CHLORIDE, AND CALCIUM CHLORIDE: .6; .31; .03; .02 INJECTION, SOLUTION INTRAVENOUS at 08:55

## 2021-04-06 RX ADMIN — MIDAZOLAM HYDROCHLORIDE 2 MG: 1 INJECTION, SOLUTION INTRAMUSCULAR; INTRAVENOUS at 08:55

## 2021-04-06 RX ADMIN — ROCURONIUM BROMIDE 10 MG: 10 SOLUTION INTRAVENOUS at 09:33

## 2021-04-06 RX ADMIN — FENTANYL CITRATE 50 MCG: 50 INJECTION INTRAMUSCULAR; INTRAVENOUS at 10:45

## 2021-04-06 RX ADMIN — PROPOFOL 200 MG: 10 INJECTION, EMULSION INTRAVENOUS at 08:59

## 2021-04-06 RX ADMIN — FENTANYL CITRATE 25 MCG: 50 INJECTION, SOLUTION INTRAMUSCULAR; INTRAVENOUS at 09:16

## 2021-04-06 RX ADMIN — GLYCOPYRROLATE 0.2 MG: 0.2 INJECTION, SOLUTION INTRAMUSCULAR; INTRAVENOUS at 09:19

## 2021-04-06 RX ADMIN — CEFAZOLIN SODIUM 1000 MG: 1 SOLUTION INTRAVENOUS at 01:47

## 2021-04-06 RX ADMIN — PROPOFOL 50 MG: 10 INJECTION, EMULSION INTRAVENOUS at 09:33

## 2021-04-06 RX ADMIN — METRONIDAZOLE 500 MG: 500 INJECTION, SOLUTION INTRAVENOUS at 01:47

## 2021-04-06 RX ADMIN — CEFAZOLIN SODIUM 3000 MG: 1 SOLUTION INTRAVENOUS at 08:55

## 2021-04-06 RX ADMIN — ACETAMINOPHEN 650 MG: 325 TABLET, FILM COATED ORAL at 16:59

## 2021-04-06 RX ADMIN — ROCURONIUM BROMIDE 40 MG: 10 SOLUTION INTRAVENOUS at 08:59

## 2021-04-06 RX ADMIN — SUGAMMADEX 251 MG: 100 INJECTION, SOLUTION INTRAVENOUS at 10:03

## 2021-04-06 RX ADMIN — FENTANYL CITRATE 25 MCG: 50 INJECTION, SOLUTION INTRAMUSCULAR; INTRAVENOUS at 09:35

## 2021-04-06 RX ADMIN — IBUPROFEN 400 MG: 400 TABLET ORAL at 20:43

## 2021-04-06 RX ADMIN — FENTANYL CITRATE 25 MCG: 50 INJECTION, SOLUTION INTRAMUSCULAR; INTRAVENOUS at 10:11

## 2021-04-06 RX ADMIN — KETOROLAC TROMETHAMINE 30 MG: 30 INJECTION, SOLUTION INTRAMUSCULAR at 09:44

## 2021-04-06 RX ADMIN — DEXAMETHASONE SODIUM PHOSPHATE 10 MG: 10 INJECTION, SOLUTION INTRAMUSCULAR; INTRAVENOUS at 09:20

## 2021-04-06 NOTE — PROGRESS NOTES
Progress Note - Yeimi Fly 50 y o  female MRN: [id number]    Unit/Bed#: S -01 Encounter: [id number]        Subjective:   Patient had laparoscopic cholecystectomy this morning, she reports to be feeling well, currently eating a meal   Denies any nausea, vomiting, fevers or chills  Objective:     Vitals: Blood pressure 125/59, pulse 59, temperature 98 °F (36 7 °C), temperature source Oral, resp  rate 18, height 5' 4" (1 626 m), weight 126 kg (276 lb 10 8 oz), last menstrual period 03/25/2021, SpO2 95 %  ,Body mass index is 47 49 kg/m²  Intake/Output Summary (Last 24 hours) at 4/6/2021 1517  Last data filed at 4/6/2021 1016  Gross per 24 hour   Intake 900 ml   Output 0 ml   Net 900 ml       Physical Exam:   General appearance: alert, appears stated age and cooperative  Lungs: clear to auscultation bilaterally, no labored breathing/accessory muscle use  Heart: regular rate and rhythm, S1, S2 normal, no murmur, click, rub or gallop  Abdomen: soft, non-tender; bowel sounds normal; no masses,  no organomegaly  Extremities: no edema    Invasive Devices     Peripheral Intravenous Line            Peripheral IV 04/05/21 Right Arm 1 day                Lab, Imaging and other studies: I have personally reviewed pertinent reports      Admission on 04/04/2021   Component Date Value    WBC 04/04/2021 8 11     RBC 04/04/2021 4 26     Hemoglobin 04/04/2021 12 2     Hematocrit 04/04/2021 38 8     MCV 04/04/2021 91     MCH 04/04/2021 28 6     MCHC 04/04/2021 31 4     RDW 04/04/2021 14 2     MPV 04/04/2021 10 0     Platelets 12/38/1226 294     nRBC 04/04/2021 0     Neutrophils Relative 04/04/2021 85*    Immat GRANS % 04/04/2021 0     Lymphocytes Relative 04/04/2021 10*    Monocytes Relative 04/04/2021 5     Eosinophils Relative 04/04/2021 0     Basophils Relative 04/04/2021 0     Neutrophils Absolute 04/04/2021 6 78     Immature Grans Absolute 04/04/2021 0 02     Lymphocytes Absolute 04/04/2021 0 83     Monocytes Absolute 04/04/2021 0 44     Eosinophils Absolute 04/04/2021 0 01     Basophils Absolute 04/04/2021 0 03     Protime 04/04/2021 12 8     INR 04/04/2021 0 96     PTT 04/04/2021 24     Sodium 04/04/2021 142     Potassium 04/04/2021 3 9     Chloride 04/04/2021 107     CO2 04/04/2021 24     ANION GAP 04/04/2021 11     BUN 04/04/2021 12     Creatinine 04/04/2021 0 90     Glucose 04/04/2021 131     Calcium 04/04/2021 8 9     Corrected Calcium 04/04/2021 9 5     AST 04/04/2021 137*    ALT 04/04/2021 77     Alkaline Phosphatase 04/04/2021 119*    Total Protein 04/04/2021 7 5     Albumin 04/04/2021 3 3*    Total Bilirubin 04/04/2021 0 78     eGFR 04/04/2021 76     Lipase 04/04/2021 220     LACTIC ACID 04/04/2021 1 2     Total CK 04/04/2021 45     D-Dimer, Quant 04/04/2021 0 44     Troponin I 04/04/2021 <0 02     Preg, Serum 04/04/2021 Negative     TSH 3RD GENERATON 04/04/2021 2 037     Color, UA 04/04/2021 Yellow     Clarity, UA 04/04/2021 Clear     Specific Gravity, UA 04/04/2021 1 010     pH, UA 04/04/2021 5 5     Leukocytes, UA 04/04/2021 Negative     Nitrite, UA 04/04/2021 Negative     Protein, UA 04/04/2021 Negative     Glucose, UA 04/04/2021 Negative     Ketones, UA 04/04/2021 Negative     Urobilinogen, UA 04/04/2021 0 2     Bilirubin, UA 04/04/2021 Negative     Blood, UA 04/04/2021 Negative     Sodium 04/04/2021 142     Potassium 04/04/2021 4 0     Chloride 04/04/2021 110*    CO2 04/04/2021 24     ANION GAP 04/04/2021 8     BUN 04/04/2021 9     Creatinine 04/04/2021 0 83     Glucose 04/04/2021 88     Calcium 04/04/2021 8 0*    Corrected Calcium 04/04/2021 8 8     AST 04/04/2021 305*    ALT 04/04/2021 199*    Alkaline Phosphatase 04/04/2021 122*    Total Protein 04/04/2021 7 0     Albumin 04/04/2021 3 0*    Total Bilirubin 04/04/2021 1 23*    eGFR 04/04/2021 84     WBC 04/04/2021 6 07     RBC 04/04/2021 4 14     Hemoglobin 04/04/2021 11 9  Hematocrit 04/04/2021 38 1     MCV 04/04/2021 92     MCH 04/04/2021 28 7     MCHC 04/04/2021 31 2*    RDW 04/04/2021 14 1     MPV 04/04/2021 9 8     Platelets 31/20/2265 285     nRBC 04/04/2021 0     Neutrophils Relative 04/04/2021 73     Immat GRANS % 04/04/2021 0     Lymphocytes Relative 04/04/2021 18     Monocytes Relative 04/04/2021 9     Eosinophils Relative 04/04/2021 0     Basophils Relative 04/04/2021 0     Neutrophils Absolute 04/04/2021 4 42     Immature Grans Absolute 04/04/2021 0 02     Lymphocytes Absolute 04/04/2021 1 08     Monocytes Absolute 04/04/2021 0 53     Eosinophils Absolute 04/04/2021 0 01     Basophils Absolute 04/04/2021 0 01     Ventricular Rate 04/04/2021 99     Atrial Rate 04/04/2021 99     GA Interval 04/04/2021 178     QRSD Interval 04/04/2021 72     QT Interval 04/04/2021 328     QTC Interval 04/04/2021 420     P Axis 04/04/2021 57     QRS Axis 04/04/2021 -12     T Wave Axis 04/04/2021 35     Lipase 04/04/2021 7,021*    SARS-CoV-2 04/04/2021 Negative     INFLUENZA A PCR 04/04/2021 Negative     INFLUENZA B PCR 04/04/2021 Negative     RSV PCR 04/04/2021 Negative     WBC 04/05/2021 4 75     RBC 04/05/2021 3 76*    Hemoglobin 04/05/2021 10 8*    Hematocrit 04/05/2021 34 9     MCV 04/05/2021 93     MCH 04/05/2021 28 7     MCHC 04/05/2021 30 9*    RDW 04/05/2021 14 6     Platelets 82/09/7492 243     MPV 04/05/2021 10 3     Sodium 04/05/2021 139     Potassium 04/05/2021 3 9     Chloride 04/05/2021 109*    CO2 04/05/2021 22     ANION GAP 04/05/2021 8     BUN 04/05/2021 4*    Creatinine 04/05/2021 0 74     Glucose 04/05/2021 105     Glucose, Fasting 04/05/2021 105*    Calcium 04/05/2021 7 8*    Corrected Calcium 04/05/2021 8 8     AST 04/05/2021 119*    ALT 04/05/2021 174*    Alkaline Phosphatase 04/05/2021 123*    Total Protein 04/05/2021 6 2*    Albumin 04/05/2021 2 7*    Total Bilirubin 04/05/2021 0 50     eGFR 04/05/2021 97  Lipase 04/05/2021 250     WBC 04/06/2021 4 52     RBC 04/06/2021 3 69*    Hemoglobin 04/06/2021 10 8*    Hematocrit 04/06/2021 37 3     MCV 04/06/2021 101*    MCH 04/06/2021 29 3     MCHC 04/06/2021 29 0*    RDW 04/06/2021 14 6     MPV 04/06/2021 10 4     Platelets 10/61/2951 226     nRBC 04/06/2021 0     Neutrophils Relative 04/06/2021 48     Immat GRANS % 04/06/2021 0     Lymphocytes Relative 04/06/2021 41     Monocytes Relative 04/06/2021 9     Eosinophils Relative 04/06/2021 2     Basophils Relative 04/06/2021 0     Neutrophils Absolute 04/06/2021 2 18     Immature Grans Absolute 04/06/2021 0 01     Lymphocytes Absolute 04/06/2021 1 84     Monocytes Absolute 04/06/2021 0 39     Eosinophils Absolute 04/06/2021 0 08     Basophils Absolute 04/06/2021 0 02     Sodium 04/06/2021 141     Potassium 04/06/2021 4 0     Chloride 04/06/2021 110*    CO2 04/06/2021 19*    ANION GAP 04/06/2021 12     BUN 04/06/2021 2*    Creatinine 04/06/2021 0 56*    Glucose 04/06/2021 79     Calcium 04/06/2021 7 7*    Corrected Calcium 04/06/2021 8 8     AST 04/06/2021 50*    ALT 04/06/2021 107*    Alkaline Phosphatase 04/06/2021 110     Total Protein 04/06/2021 6 2*    Albumin 04/06/2021 2 6*    Total Bilirubin 04/06/2021 0 40     eGFR 04/06/2021 111     ABO Grouping 04/06/2021 A     Rh Factor 04/06/2021 Negative          Assessment/Plan:    1  Biliary pancreatitis and choledocholithiasis, patient appears to have passed CBD stone based on results from MRI and her continually improving LFTs    She underwent cholecystectomy today    - continue to monitor LFTs, should not require ERCP at this point  - postop care as per surgery

## 2021-04-06 NOTE — PLAN OF CARE
Problem: PAIN - ADULT  Goal: Verbalizes/displays adequate comfort level or baseline comfort level  Description: Interventions:  - Encourage patient to monitor pain and request assistance  - Assess pain using appropriate pain scale  - Administer analgesics based on type and severity of pain and evaluate response  - Implement non-pharmacological measures as appropriate and evaluate response  - Consider cultural and social influences on pain and pain management  - Notify physician/advanced practitioner if interventions unsuccessful or patient reports new pain  Outcome: Progressing     Problem: INFECTION - ADULT  Goal: Absence or prevention of progression during hospitalization  Description: INTERVENTIONS:  - Assess and monitor for signs and symptoms of infection  - Monitor lab/diagnostic results  - Monitor all insertion sites, i e  indwelling lines, tubes, and drains  - Monitor endotracheal if appropriate and nasal secretions for changes in amount and color  - [place] appropriate cooling/warming therapies per order  - Administer medications as ordered  - Instruct and encourage patient and family to use good hand hygiene technique  - Identify and instruct in appropriate isolation precautions for identified infection/condition  Outcome: Progressing     Problem: SAFETY ADULT  Goal: Patient will remain free of falls  Description: INTERVENTIONS:  - Assess patient frequently for physical needs  -  Identify cognitive and physical deficits and behaviors that affect risk of falls    -  [place] fall precautions as indicated by assessment   - Educate patient/family on patient safety including physical limitations  - Instruct patient to call for assistance with activity based on assessment  - Modify environment to reduce risk of injury  - Consider OT/PT consult to assist with strengthening/mobility  Outcome: Progressing  Goal: Maintain or return to baseline ADL function  Description: INTERVENTIONS:  -  Assess patient's ability to carry out ADLs; assess patient's baseline for ADL function and identify physical deficits which impact ability to perform ADLs (bathing, care of mouth/teeth, toileting, grooming, dressing, etc )  - Assess/evaluate cause of self-care deficits   - Assess range of motion  - Assess patient's mobility; develop plan if impaired  - Assess patient's need for assistive devices and provide as appropriate  - Encourage maximum independence but intervene and supervise when necessary  - Involve family in performance of ADLs  - Assess for home care needs following discharge   - Consider OT consult to assist with ADL evaluation and planning for discharge  - Provide patient education as appropriate  Outcome: Progressing  Goal: Maintain or return mobility status to optimal level  Description: INTERVENTIONS:  - Assess patient's baseline mobility status (ambulation, transfers, stairs, etc )    - Identify cognitive and physical deficits and behaviors that affect mobility  - Identify mobility aids required to assist with transfers and/or ambulation (gait belt, sit-to-stand, lift, walker, cane, etc )  - [place] fall precautions as indicated by assessment  - Record patient progress and toleration of activity level on Mobility SBAR; progress patient to next Phase/Stage  - Instruct patient to call for assistance with activity based on assessment  - Consider rehabilitation consult to assist with strengthening/weightbearing, etc   Outcome: Progressing     Problem: DISCHARGE PLANNING  Goal: Discharge to home or other facility with appropriate resources  Description: INTERVENTIONS:  - Identify barriers to discharge w/patient and caregiver  - Arrange for needed discharge resources and transportation as appropriate  - Identify discharge learning needs (meds, wound care, etc )  - Arrange for interpretive services to assist at discharge as needed  - Refer to Case Management Department for coordinating discharge planning if the patient needs post-hospital services based on physician/advanced practitioner order or complex needs related to functional status, cognitive ability, or social support system  Outcome: Progressing     Problem: Knowledge Deficit  Goal: Patient/family/caregiver demonstrates understanding of disease process, treatment plan, medications, and discharge instructions  Description: Complete learning assessment and assess knowledge base    Interventions:  - Provide teaching at level of understanding  - Provide teaching via preferred learning methods  Outcome: Progressing     Problem: GASTROINTESTINAL - ADULT  Goal: Minimal or absence of nausea and/or vomiting  Description: INTERVENTIONS:  - Administer IV fluids if ordered to ensure adequate hydration  - Maintain NPO status until nausea and vomiting are resolved  - Nasogastric tube if ordered  - Administer ordered antiemetic medications as needed  - Provide nonpharmacologic comfort measures as appropriate  - Advance diet as tolerated, if ordered  - Consider nutrition services referral to assist patient with adequate nutrition and appropriate food choices  Outcome: Progressing  Goal: Maintains adequate nutritional intake  Description: INTERVENTIONS:  - Monitor percentage of each meal consumed  - Identify factors contributing to decreased intake, treat as appropriate  - Assist with meals as needed  - Monitor I&O, weight, and lab values if indicated  - Obtain nutrition services referral as needed  Outcome: Progressing

## 2021-04-06 NOTE — OP NOTE
OPERATIVE REPORT  PATIENT NAME: Kyree Weller    :  1975  MRN: [id number]  Pt Location: AN OR ROOM 01    SURGERY DATE: 2021    Surgeon(s) and Role:     * Kyle Forman,  - Primary    Preop Diagnosis:  Biliary pancreatitis    Post-Op Diagnosis Codes:    Biliary pancreatitis    Procedure(s) (LRB):  CHOLECYSTECTOMY LAPAROSCOPIC (N/A)    Specimen(s):  ID Type Source Tests Collected by Time Destination   1 : CC family/referring MD Tissue Gallbladder TISSUE EXAM [facility],  2021 0916        Estimated Blood Loss:   Minimal    Drains:  * No LDAs found *    Anesthesia Type:   General/local    Operative Indications:  Gallstones/biliary pancreatitis    Operative Findings:  Chronic inflammation evidence by omentum adherent to the gallbladder  ASA 3  Wound class 2  Height 64 in weight 126 kg/277 lb  BMI 48    Complications:   None    Procedure and Technique:  Patient was brought the operative suite and identified by visualization, conversation, by armband  Sequential compression pumps were placed  She was given Ancef perioperatively  Once under anesthesia abdomen is then prepped and draped in a sterile fashion  Time-out was performed was assured that the prep was dry  Local was instilled at the supraumbilical fold  Small vertical skin incision was made the subcutaneous tissues were bluntly dissected with Kocher clamps down the fascia  Fascia lifted up and divided the midline  I poked through the underlying peritoneum gaining access into the abdominal cavity  11 mm trocar was placed to the fascia  CO2 was attached creating pneumoperitoneum she was placed in steep reverse Trendelenburg  A footboard was utilized  Three other 5 mm bladeless trocars were placed in the right upper quadrant  Was a fair amount of omentum adherent to the gallbladder  This was taken down with a combination of blunt dissection use of the Harmonic scalpel  Gallbladder was lifted cephalad    Cystic duct and cystic artery structures were carefully dissected out obtaining a critical view  Both were divided to Ligaclips  Gallbladder was then taken off the liver using variable speed Harmonic scalpel  There was excellent hemostasis  Gauze was placed into an Endo-Catch bag  Irrigation was carried out  Pneumoperitoneum was carefully evacuated  Gallbladder was then brought to the umbilical port site  Other trocars removed  Fascia at the umbilical port site was closed using 0 Vicryl in a figure-of-eight fashion x2  Local was instilled  Four Monocryl was used to close skin incisions in a subcuticular fashion  Wounds washed and dried  Sterile skin glue was applied  She was awake in the operating returned to the recovery area in stable condition having tolerated the procedure well     I was present for the entire procedure    Patient Disposition:  PACU     SIGNATURE: Mirlande Vazquez DO  DATE: April 6, 2021  TIME: 9:55 AM

## 2021-04-06 NOTE — QUICK NOTE
Post-Operative check  Patient seen following laparoscopic cholecystectomy for choledocholithiasis with biliary pancreatitis this admission  No intraoperative issues, the patient recovered appropriately in PACU and was returned to her hospital room in stable condition  Presently, she reports minimal abdominal pain, does have an ice pack in place, notes transient nausea which is resolving without medication, no vomiting, has not yet been out of bed or urinated  VSS, afebrile    Abdomen is soft, nondistended, minimally and appropriately tender, incisions are C/D/I sealed with Exofin    General:  No acute distress  HEENT:  Normocephalic, atraumatic  Cardiovascular:  Regular rate and rhythm  Respiratory:  No distress, on room air  Abdomen:  Soft, nondistended, minimal and appropriate tenderness, incisions C/D/I  Extremities:  No clubbing or cyanosis  Neuro:  Awake and alert  Skin:  Warm, dry    Assessment  10-year-old female presenting with choledocholithiasis and secondary biliary pancreatitis now status post MRCP and laparoscopic cholecystectomy done today  Recovering appropriately, plan for discharge within 24 hours  Plan  -resume diet  -discontinue IV fluids as appropriate with p o   Intake  -pain/nausea control  -ambulation out of bed  -maintain ice pack to incision  -DVT prophylaxis  -intended discharge 04/07 at patient's request

## 2021-04-06 NOTE — ANESTHESIA POSTPROCEDURE EVALUATION
Post-Op Assessment Note    CV Status:  Stable  Pain Score: 0    Pain management: adequate     Mental Status:  Alert and awake   Hydration Status:  Euvolemic   PONV Controlled:  Controlled   Airway Patency:  Patent      Post Op Vitals Reviewed: Yes      Staff: CRNA         No complications documented      BP   85/52   Temp   97   Pulse  72   Resp 12   SpO2 100%

## 2021-04-06 NOTE — PROGRESS NOTES
Progress Note - General Surgery   Sapna Hands 50 y o  female MRN: [id number]  Unit/Bed#: OR POOL Encounter: [id number]    Assessment:  50 F with choledocholithiasis, biliary pancreatitis  MRCP negative    Plan:  NPO  IV fluids  To OR for laparoscopic cholecystectomy today  DVT prophylaxis    Subjective/Objective     Subjective: No acute events overnight  Pain is improved  Objective:    Blood pressure 144/78, pulse 84, temperature 97 5 °F (36 4 °C), temperature source Temporal, resp  rate 18, weight 126 kg (276 lb 10 8 oz), last menstrual period 03/25/2021, SpO2 99 %  ,Body mass index is 47 49 kg/m²        Intake/Output Summary (Last 24 hours) at 4/6/2021 0830  Last data filed at 4/6/2021 0500  Gross per 24 hour   Intake 220 ml   Output 0 ml   Net 220 ml       Invasive Devices     Peripheral Intravenous Line            Peripheral IV 04/05/21 Right Arm less than 1 day                Physical Exam:   General: NAD, AAOx3  Eyes: PERRL  ENT: moist mucous membranes  Neck: supple  CV: RRR +S1/S2  Chest: respirations non-labored  Abdomen: soft, NT ND  Extremities: atraumatic, no edema      Results from last 7 days   Lab Units 04/06/21  0447 04/05/21  0559 04/04/21  0944   WBC Thousand/uL 4 52 4 75 6 07   HEMOGLOBIN g/dL 10 8* 10 8* 11 9   HEMATOCRIT % 37 3 34 9 38 1   PLATELETS Thousands/uL 226 243 285     Results from last 7 days   Lab Units 04/06/21  0447 04/05/21  0559 04/04/21  0944   POTASSIUM mmol/L 4 0 3 9 4 0   CHLORIDE mmol/L 110* 109* 110*   CO2 mmol/L 19* 22 24   BUN mg/dL 2* 4* 9   CREATININE mg/dL 0 56* 0 74 0 83   CALCIUM mg/dL 7 7* 7 8* 8 0*     Results from last 7 days   Lab Units 04/04/21  0144   INR  0 96   PTT seconds 24

## 2021-04-06 NOTE — PLAN OF CARE
Problem: PAIN - ADULT  Goal: Verbalizes/displays adequate comfort level or baseline comfort level  Description: Interventions:  - Encourage patient to monitor pain and request assistance  - Assess pain using appropriate pain scale  - Administer analgesics based on type and severity of pain and evaluate response  - Implement non-pharmacological measures as appropriate and evaluate response  - Consider cultural and social influences on pain and pain management  - Notify physician/advanced practitioner if interventions unsuccessful or patient reports new pain  Outcome: Progressing     Problem: INFECTION - ADULT  Goal: Absence or prevention of progression during hospitalization  Description: INTERVENTIONS:  - Assess and monitor for signs and symptoms of infection  - Monitor lab/diagnostic results  - Monitor all insertion sites, i e  indwelling lines, tubes, and drains  - Monitor endotracheal if appropriate and nasal secretions for changes in amount and color  - [place] appropriate cooling/warming therapies per order  - Administer medications as ordered  - Instruct and encourage patient and family to use good hand hygiene technique  - Identify and instruct in appropriate isolation precautions for identified infection/condition  Outcome: Progressing     Problem: SAFETY ADULT  Goal: Patient will remain free of falls  Description: INTERVENTIONS:  - Assess patient frequently for physical needs  -  Identify cognitive and physical deficits and behaviors that affect risk of falls    -  [place] fall precautions as indicated by assessment   - Educate patient/family on patient safety including physical limitations  - Instruct patient to call for assistance with activity based on assessment  - Modify environment to reduce risk of injury  - Consider OT/PT consult to assist with strengthening/mobility  Outcome: Progressing  Goal: Maintain or return to baseline ADL function  Description: INTERVENTIONS:  -  Assess patient's ability to carry out ADLs; assess patient's baseline for ADL function and identify physical deficits which impact ability to perform ADLs (bathing, care of mouth/teeth, toileting, grooming, dressing, etc )  - Assess/evaluate cause of self-care deficits   - Assess range of motion  - Assess patient's mobility; develop plan if impaired  - Assess patient's need for assistive devices and provide as appropriate  - Encourage maximum independence but intervene and supervise when necessary  - Involve family in performance of ADLs  - Assess for home care needs following discharge   - Consider OT consult to assist with ADL evaluation and planning for discharge  - Provide patient education as appropriate  Outcome: Progressing  Goal: Maintain or return mobility status to optimal level  Description: INTERVENTIONS:  - Assess patient's baseline mobility status (ambulation, transfers, stairs, etc )    - Identify cognitive and physical deficits and behaviors that affect mobility  - Identify mobility aids required to assist with transfers and/or ambulation (gait belt, sit-to-stand, lift, walker, cane, etc )  - [place] fall precautions as indicated by assessment  - Record patient progress and toleration of activity level on Mobility SBAR; progress patient to next Phase/Stage  - Instruct patient to call for assistance with activity based on assessment  - Consider rehabilitation consult to assist with strengthening/weightbearing, etc   Outcome: Progressing     Problem: DISCHARGE PLANNING  Goal: Discharge to home or other facility with appropriate resources  Description: INTERVENTIONS:  - Identify barriers to discharge w/patient and caregiver  - Arrange for needed discharge resources and transportation as appropriate  - Identify discharge learning needs (meds, wound care, etc )  - Arrange for interpretive services to assist at discharge as needed  - Refer to Case Management Department for coordinating discharge planning if the patient needs post-hospital services based on physician/advanced practitioner order or complex needs related to functional status, cognitive ability, or social support system  Outcome: Progressing     Problem: Knowledge Deficit  Goal: Patient/family/caregiver demonstrates understanding of disease process, treatment plan, medications, and discharge instructions  Description: Complete learning assessment and assess knowledge base    Interventions:  - Provide teaching at level of understanding  - Provide teaching via preferred learning methods  Outcome: Progressing     Problem: GASTROINTESTINAL - ADULT  Goal: Minimal or absence of nausea and/or vomiting  Description: INTERVENTIONS:  - Administer IV fluids if ordered to ensure adequate hydration  - Maintain NPO status until nausea and vomiting are resolved  - Nasogastric tube if ordered  - Administer ordered antiemetic medications as needed  - Provide nonpharmacologic comfort measures as appropriate  - Advance diet as tolerated, if ordered  - Consider nutrition services referral to assist patient with adequate nutrition and appropriate food choices  Outcome: Progressing  Goal: Maintains adequate nutritional intake  Description: INTERVENTIONS:  - Monitor percentage of each meal consumed  - Identify factors contributing to decreased intake, treat as appropriate  - Assist with meals as needed  - Monitor I&O, weight, and lab values if indicated  - Obtain nutrition services referral as needed  Outcome: Progressing

## 2021-04-06 NOTE — PLAN OF CARE
Problem: PAIN - ADULT  Goal: Verbalizes/displays adequate comfort level or baseline comfort level  Description: Interventions:  - Encourage patient to monitor pain and request assistance  - Assess pain using appropriate pain scale  - Administer analgesics based on type and severity of pain and evaluate response  - Implement non-pharmacological measures as appropriate and evaluate response  - Consider cultural and social influences on pain and pain management  - Notify physician/advanced practitioner if interventions unsuccessful or patient reports new pain  Outcome: Progressing     Problem: INFECTION - ADULT  Goal: Absence or prevention of progression during hospitalization  Description: INTERVENTIONS:  - Assess and monitor for signs and symptoms of infection  - Monitor lab/diagnostic results  - Monitor all insertion sites, i e  indwelling lines, tubes, and drains  - Monitor endotracheal if appropriate and nasal secretions for changes in amount and color  - [place] appropriate cooling/warming therapies per order  - Administer medications as ordered  - Instruct and encourage patient and family to use good hand hygiene technique  - Identify and instruct in appropriate isolation precautions for identified infection/condition  Outcome: Progressing     Problem: SAFETY ADULT  Goal: Patient will remain free of falls  Description: INTERVENTIONS:  - Assess patient frequently for physical needs  -  Identify cognitive and physical deficits and behaviors that affect risk of falls    -  [place] fall precautions as indicated by assessment   - Educate patient/family on patient safety including physical limitations  - Instruct patient to call for assistance with activity based on assessment  - Modify environment to reduce risk of injury  - Consider OT/PT consult to assist with strengthening/mobility  Outcome: Progressing  Goal: Maintain or return to baseline ADL function  Description: INTERVENTIONS:  -  Assess patient's ability to carry out ADLs; assess patient's baseline for ADL function and identify physical deficits which impact ability to perform ADLs (bathing, care of mouth/teeth, toileting, grooming, dressing, etc )  - Assess/evaluate cause of self-care deficits   - Assess range of motion  - Assess patient's mobility; develop plan if impaired  - Assess patient's need for assistive devices and provide as appropriate  - Encourage maximum independence but intervene and supervise when necessary  - Involve family in performance of ADLs  - Assess for home care needs following discharge   - Consider OT consult to assist with ADL evaluation and planning for discharge  - Provide patient education as appropriate  Outcome: Progressing  Goal: Maintain or return mobility status to optimal level  Description: INTERVENTIONS:  - Assess patient's baseline mobility status (ambulation, transfers, stairs, etc )    - Identify cognitive and physical deficits and behaviors that affect mobility  - Identify mobility aids required to assist with transfers and/or ambulation (gait belt, sit-to-stand, lift, walker, cane, etc )  - [place] fall precautions as indicated by assessment  - Record patient progress and toleration of activity level on Mobility SBAR; progress patient to next Phase/Stage  - Instruct patient to call for assistance with activity based on assessment  - Consider rehabilitation consult to assist with strengthening/weightbearing, etc   Outcome: Progressing     Problem: DISCHARGE PLANNING  Goal: Discharge to home or other facility with appropriate resources  Description: INTERVENTIONS:  - Identify barriers to discharge w/patient and caregiver  - Arrange for needed discharge resources and transportation as appropriate  - Identify discharge learning needs (meds, wound care, etc )  - Arrange for interpretive services to assist at discharge as needed  - Refer to Case Management Department for coordinating discharge planning if the patient needs post-hospital services based on physician/advanced practitioner order or complex needs related to functional status, cognitive ability, or social support system  Outcome: Progressing     Problem: Knowledge Deficit  Goal: Patient/family/caregiver demonstrates understanding of disease process, treatment plan, medications, and discharge instructions  Description: Complete learning assessment and assess knowledge base    Interventions:  - Provide teaching at level of understanding  - Provide teaching via preferred learning methods  Outcome: Progressing     Problem: GASTROINTESTINAL - ADULT  Goal: Minimal or absence of nausea and/or vomiting  Description: INTERVENTIONS:  - Administer IV fluids if ordered to ensure adequate hydration  - Maintain NPO status until nausea and vomiting are resolved  - Nasogastric tube if ordered  - Administer ordered antiemetic medications as needed  - Provide nonpharmacologic comfort measures as appropriate  - Advance diet as tolerated, if ordered  - Consider nutrition services referral to assist patient with adequate nutrition and appropriate food choices  Outcome: Progressing  Goal: Maintains adequate nutritional intake  Description: INTERVENTIONS:  - Monitor percentage of each meal consumed  - Identify factors contributing to decreased intake, treat as appropriate  - Assist with meals as needed  - Monitor I&O, weight, and lab values if indicated  - Obtain nutrition services referral as needed  Outcome: Progressing

## 2021-04-07 VITALS
BODY MASS INDEX: 47.24 KG/M2 | HEART RATE: 51 BPM | WEIGHT: 276.68 LBS | HEIGHT: 64 IN | OXYGEN SATURATION: 98 % | SYSTOLIC BLOOD PRESSURE: 102 MMHG | RESPIRATION RATE: 18 BRPM | DIASTOLIC BLOOD PRESSURE: 58 MMHG | TEMPERATURE: 98.1 F

## 2021-04-07 PROCEDURE — 99024 POSTOP FOLLOW-UP VISIT: CPT | Performed by: SURGERY

## 2021-04-07 RX ADMIN — IBUPROFEN 400 MG: 400 TABLET ORAL at 02:38

## 2021-04-07 RX ADMIN — ENOXAPARIN SODIUM 40 MG: 40 INJECTION SUBCUTANEOUS at 08:26

## 2021-04-07 RX ADMIN — IBUPROFEN 400 MG: 400 TABLET ORAL at 08:26

## 2021-04-07 NOTE — PLAN OF CARE
Problem: PAIN - ADULT  Goal: Verbalizes/displays adequate comfort level or baseline comfort level  Description: Interventions:  - Encourage patient to monitor pain and request assistance  - Assess pain using appropriate pain scale  - Administer analgesics based on type and severity of pain and evaluate response  - Implement non-pharmacological measures as appropriate and evaluate response  - Consider cultural and social influences on pain and pain management  - Notify physician/advanced practitioner if interventions unsuccessful or patient reports new pain  4/7/2021 1148 by Jt Pereira RN  Outcome: Completed  4/7/2021 0820 by Jt Pereira RN  Outcome: Progressing     Problem: INFECTION - ADULT  Goal: Absence or prevention of progression during hospitalization  Description: INTERVENTIONS:  - Assess and monitor for signs and symptoms of infection  - Monitor lab/diagnostic results  - Monitor all insertion sites, i e  indwelling lines, tubes, and drains  - Monitor endotracheal if appropriate and nasal secretions for changes in amount and color  - [place] appropriate cooling/warming therapies per order  - Administer medications as ordered  - Instruct and encourage patient and family to use good hand hygiene technique  - Identify and instruct in appropriate isolation precautions for identified infection/condition  4/7/2021 1148 by Jt Pereira RN  Outcome: Completed  4/7/2021 0820 by Jt Pereira RN  Outcome: Progressing     Problem: SAFETY ADULT  Goal: Patient will remain free of falls  Description: INTERVENTIONS:  - Assess patient frequently for physical needs  -  Identify cognitive and physical deficits and behaviors that affect risk of falls    -  [place] fall precautions as indicated by assessment   - Educate patient/family on patient safety including physical limitations  - Instruct patient to call for assistance with activity based on assessment  - Modify environment to reduce risk of injury  - Consider OT/PT consult to assist with strengthening/mobility  4/7/2021 1148 by Sindi Byrd RN  Outcome: Completed  4/7/2021 0820 by Sindi Byrd RN  Outcome: Progressing  Goal: Maintain or return to baseline ADL function  Description: INTERVENTIONS:  -  Assess patient's ability to carry out ADLs; assess patient's baseline for ADL function and identify physical deficits which impact ability to perform ADLs (bathing, care of mouth/teeth, toileting, grooming, dressing, etc )  - Assess/evaluate cause of self-care deficits   - Assess range of motion  - Assess patient's mobility; develop plan if impaired  - Assess patient's need for assistive devices and provide as appropriate  - Encourage maximum independence but intervene and supervise when necessary  - Involve family in performance of ADLs  - Assess for home care needs following discharge   - Consider OT consult to assist with ADL evaluation and planning for discharge  - Provide patient education as appropriate  4/7/2021 1148 by Sindi Byrd RN  Outcome: Completed  4/7/2021 0820 by Sindi Byrd RN  Outcome: Progressing  Goal: Maintain or return mobility status to optimal level  Description: INTERVENTIONS:  - Assess patient's baseline mobility status (ambulation, transfers, stairs, etc )    - Identify cognitive and physical deficits and behaviors that affect mobility  - Identify mobility aids required to assist with transfers and/or ambulation (gait belt, sit-to-stand, lift, walker, cane, etc )  - [place] fall precautions as indicated by assessment  - Record patient progress and toleration of activity level on Mobility SBAR; progress patient to next Phase/Stage  - Instruct patient to call for assistance with activity based on assessment  - Consider rehabilitation consult to assist with strengthening/weightbearing, etc   4/7/2021 1148 by Sindi Byrd RN  Outcome: Completed  4/7/2021 0820 by Sindi Byrd RN  Outcome: Progressing     Problem: DISCHARGE PLANNING  Goal: Discharge to home or other facility with appropriate resources  Description: INTERVENTIONS:  - Identify barriers to discharge w/patient and caregiver  - Arrange for needed discharge resources and transportation as appropriate  - Identify discharge learning needs (meds, wound care, etc )  - Arrange for interpretive services to assist at discharge as needed  - Refer to Case Management Department for coordinating discharge planning if the patient needs post-hospital services based on physician/advanced practitioner order or complex needs related to functional status, cognitive ability, or social support system  4/7/2021 1148 by Katherine Rivera RN  Outcome: Completed  4/7/2021 0820 by Katherine Rivera RN  Outcome: Progressing     Problem: Knowledge Deficit  Goal: Patient/family/caregiver demonstrates understanding of disease process, treatment plan, medications, and discharge instructions  Description: Complete learning assessment and assess knowledge base    Interventions:  - Provide teaching at level of understanding  - Provide teaching via preferred learning methods  4/7/2021 1148 by Katherine Rivera RN  Outcome: Completed  4/7/2021 0820 by Katherine Rivera RN  Outcome: Progressing     Problem: GASTROINTESTINAL - ADULT  Goal: Minimal or absence of nausea and/or vomiting  Description: INTERVENTIONS:  - Administer IV fluids if ordered to ensure adequate hydration  - Maintain NPO status until nausea and vomiting are resolved  - Nasogastric tube if ordered  - Administer ordered antiemetic medications as needed  - Provide nonpharmacologic comfort measures as appropriate  - Advance diet as tolerated, if ordered  - Consider nutrition services referral to assist patient with adequate nutrition and appropriate food choices  4/7/2021 1148 by Katherine Rivera RN  Outcome: Completed  4/7/2021 0820 by Katherine Rivera RN  Outcome: Progressing  Goal: Maintains adequate nutritional intake  Description: INTERVENTIONS:  - Monitor percentage of each meal consumed  - Identify factors contributing to decreased intake, treat as appropriate  - Assist with meals as needed  - Monitor I&O, weight, and lab values if indicated  - Obtain nutrition services referral as needed  4/7/2021 1148 by Lesvia Apple RN  Outcome: Completed  4/7/2021 0820 by Lesvia Apple RN  Outcome: Progressing

## 2021-04-07 NOTE — PLAN OF CARE
Problem: PAIN - ADULT  Goal: Verbalizes/displays adequate comfort level or baseline comfort level  Description: Interventions:  - Encourage patient to monitor pain and request assistance  - Assess pain using appropriate pain scale  - Administer analgesics based on type and severity of pain and evaluate response  - Implement non-pharmacological measures as appropriate and evaluate response  - Consider cultural and social influences on pain and pain management  - Notify physician/advanced practitioner if interventions unsuccessful or patient reports new pain  Outcome: Progressing     Problem: INFECTION - ADULT  Goal: Absence or prevention of progression during hospitalization  Description: INTERVENTIONS:  - Assess and monitor for signs and symptoms of infection  - Monitor lab/diagnostic results  - Monitor all insertion sites, i e  indwelling lines, tubes, and drains  - Monitor endotracheal if appropriate and nasal secretions for changes in amount and color  - [place] appropriate cooling/warming therapies per order  - Administer medications as ordered  - Instruct and encourage patient and family to use good hand hygiene technique  - Identify and instruct in appropriate isolation precautions for identified infection/condition  Outcome: Progressing     Problem: SAFETY ADULT  Goal: Patient will remain free of falls  Description: INTERVENTIONS:  - Assess patient frequently for physical needs  -  Identify cognitive and physical deficits and behaviors that affect risk of falls    -  [place] fall precautions as indicated by assessment   - Educate patient/family on patient safety including physical limitations  - Instruct patient to call for assistance with activity based on assessment  - Modify environment to reduce risk of injury  - Consider OT/PT consult to assist with strengthening/mobility  Outcome: Progressing  Goal: Maintain or return to baseline ADL function  Description: INTERVENTIONS:  -  Assess patient's ability to carry out ADLs; assess patient's baseline for ADL function and identify physical deficits which impact ability to perform ADLs (bathing, care of mouth/teeth, toileting, grooming, dressing, etc )  - Assess/evaluate cause of self-care deficits   - Assess range of motion  - Assess patient's mobility; develop plan if impaired  - Assess patient's need for assistive devices and provide as appropriate  - Encourage maximum independence but intervene and supervise when necessary  - Involve family in performance of ADLs  - Assess for home care needs following discharge   - Consider OT consult to assist with ADL evaluation and planning for discharge  - Provide patient education as appropriate  Outcome: Progressing  Goal: Maintain or return mobility status to optimal level  Description: INTERVENTIONS:  - Assess patient's baseline mobility status (ambulation, transfers, stairs, etc )    - Identify cognitive and physical deficits and behaviors that affect mobility  - Identify mobility aids required to assist with transfers and/or ambulation (gait belt, sit-to-stand, lift, walker, cane, etc )  - [place] fall precautions as indicated by assessment  - Record patient progress and toleration of activity level on Mobility SBAR; progress patient to next Phase/Stage  - Instruct patient to call for assistance with activity based on assessment  - Consider rehabilitation consult to assist with strengthening/weightbearing, etc   Outcome: Progressing     Problem: DISCHARGE PLANNING  Goal: Discharge to home or other facility with appropriate resources  Description: INTERVENTIONS:  - Identify barriers to discharge w/patient and caregiver  - Arrange for needed discharge resources and transportation as appropriate  - Identify discharge learning needs (meds, wound care, etc )  - Arrange for interpretive services to assist at discharge as needed  - Refer to Case Management Department for coordinating discharge planning if the patient needs post-hospital services based on physician/advanced practitioner order or complex needs related to functional status, cognitive ability, or social support system  Outcome: Progressing     Problem: Knowledge Deficit  Goal: Patient/family/caregiver demonstrates understanding of disease process, treatment plan, medications, and discharge instructions  Description: Complete learning assessment and assess knowledge base    Interventions:  - Provide teaching at level of understanding  - Provide teaching via preferred learning methods  Outcome: Progressing     Problem: GASTROINTESTINAL - ADULT  Goal: Minimal or absence of nausea and/or vomiting  Description: INTERVENTIONS:  - Administer IV fluids if ordered to ensure adequate hydration  - Maintain NPO status until nausea and vomiting are resolved  - Nasogastric tube if ordered  - Administer ordered antiemetic medications as needed  - Provide nonpharmacologic comfort measures as appropriate  - Advance diet as tolerated, if ordered  - Consider nutrition services referral to assist patient with adequate nutrition and appropriate food choices  Outcome: Progressing  Goal: Maintains adequate nutritional intake  Description: INTERVENTIONS:  - Monitor percentage of each meal consumed  - Identify factors contributing to decreased intake, treat as appropriate  - Assist with meals as needed  - Monitor I&O, weight, and lab values if indicated  - Obtain nutrition services referral as needed  Outcome: Progressing

## 2021-04-07 NOTE — PROGRESS NOTES
Progress Note - General Surgery   Cuongsandra Winters 50 y o  female MRN: [id number]  Unit/Bed#: S -01 Encounter: [id number]    Assessment:  50 F p/w choledocholithiasis and biliary pancreatitis, now resolved, patient is status post laparoscopic cholecystectomy on 04/06    VSS, afebrile    Multiple unmeasured episodes of urination    Tolerating soft diet    Abdomen soft, nondistended, appropriately tender, incision C/D/I sealed with Exofin    No labs this a m  Plan:  -continue soft diet  -pain control, ice pack to incisions  -ambulation out of bed  -DVT prophylaxis  -anticipate discharge to home this morning    Subjective/Objective     Subjective: The patient reports mild abdominal pain status post laparoscopy, improved with nonnarcotic pain medication and ice pack application  She is tolerating her diet without nausea or vomiting,is urinating spontaneously, and has ambulated through the halls  Objective:     Blood pressure 121/60, pulse (!) 54, temperature 98 3 °F (36 8 °C), temperature source Oral, resp  rate 18, height 5' 4" (1 626 m), weight 126 kg (276 lb 10 8 oz), last menstrual period 03/25/2021, SpO2 96 %  ,Body mass index is 47 49 kg/m²  Intake/Output Summary (Last 24 hours) at 4/7/2021 0534  Last data filed at 4/6/2021 1845  Gross per 24 hour   Intake 1380 ml   Output 0 ml   Net 1380 ml       Invasive Devices     Peripheral Intravenous Line            Peripheral IV 04/05/21 Right Arm 1 day                Physical Exam:   General:  Obese, no acute distress  HEENT:  Normocephalic, atraumatic, mucous membranes moist  Cardiovascular:  Regular rate and rhythm  Respiratory:  No distress, respiring comfortably on room air  Abdomen:  Soft, nondistended, minimally and appropriately tender, incisions C/D/I  Extremities:  No clubbing, cyanosis, or edema  Neuro:  AAO x3  Skin:  Warm, dry    Lab, Imaging and other studies:  No labs this a m    VTE Pharmacologic Prophylaxis: Enoxaparin (Lovenox)  VTE Mechanical Prophylaxis: sequential compression device

## 2021-04-08 NOTE — DISCHARGE SUMMARY
Discharge Date: Discharge Summary - Naida Orona 50 y o  female MRN: [id number]    Unit/Bed#: S -01 Encounter: [id number]    Admission Date: 4/4/2021   Discharge Date: 4/7/2021    Admitting Diagnosis:   Biliary colic [P71 10]  Choledocholithiasis [K80 50]  Indigestion [K30]  Abdominal pain [R10 9]    Principle/ Secondary Diagnosis:  History reviewed  No pertinent past medical history  Past Surgical History:   Procedure Laterality Date    CHOLECYSTECTOMY LAPAROSCOPIC N/A 4/6/2021    Procedure: CHOLECYSTECTOMY LAPAROSCOPIC;  Surgeon: Betsy Zepeda DO;  [facility];  Service: General    ECTOPIC PREGNANCY SURGERY         Discharge Diagnoses:   Principal Problem:    Choledocholithiasis      Procedures Performed:  Orders Placed This Encounter   Procedures    ED ECG Documentation Only     CHOLECYSTECTOMY LAPAROSCOPIC (N/A Abdomen)  Procedure(s):  CHOLECYSTECTOMY LAPAROSCOPIC    Consultants:     Gastroenterology    HPI: As per Dr Bethany Huang MD: "Naida Orona is a 50 y o  female who presents to the hospital with abdominal pain  She states that it started Thursday night  She usually eats relatively healthy but had a particularly fatty meal on Thursday and had pain afterward  It is located in the epigastrium and RUQ and radiates to the upper back  It is associated with some nausea  The pain improved but returned yesterday when she tried to eat again  She endorses feeling warm but otherwise denies fevers or chills  She states she is otherwise healthy  Does have a history of laparoscopic ectopic pregnancy removal "    Summary of Hospital Course: Patient was admitted to the hospital and Gastroenterology was consulted for findings of choledocholithiasis and tentative plan for ERCP was made, but LFTs were noted to be notably downtrendoing the following day and decision was made to obtain MRI/MRCP which was negative for choledocholithiasis   Patient subsequently underwent a laparoscopic cholecystectomy which showed chronic inflammation evidenced by omental adhesions to the gallbladder  The patient tolerated the procedure well  POD1 patient was doing well, pain controlled and tolerating solid diet  She was deemed appropriate for discharge home with instructions to follow up with Dr Leana Goldberg in two weeks for routine post-operative check  She was given a short prescription of narcotic pain medication for pain control as needed  All questions and concerns were answered prior to discharge  Significant Findings, Care, Treatment and Services Provided: See Above  Xr Chest 1 View Portable    Result Date: 4/5/2021  Impression: No acute cardiopulmonary disease  Workstation performed: [id number]     Us Gallbladder    Result Date: 4/4/2021  Impression: 1  Common bile duct is dilated measuring up to 9 mm as well as mild intrahepatic biliary ductal dilatation  7 mm echogenic focus in the distal CBD in keeping with choledocholithiasis  Cholelithiasis with mild gallbladder wall thickening, may be secondary to choledocholithiasis with early acute cholecystitis not excluded in the appropriate clinical setting  No pericholecystic fluid or sonographic Ast[place]a's sign  2   Apparent area of outpouching at the gallbladder fundus as noted on the prior CT which contains calculi, with visualization limited on the current study  This can be further characterized on follow-up abdomen MR/MRCP  3   Increased hepatic echogenicity, nonspecific and may be due to hepatic steatosis and/or hepatic parenchymal disease  The study was marked in [facility] for immediate notification  Workstation performed: [id number]     Ct Chest Abdomen Pelvis W Contrast    Result Date: 4/4/2021  Impression: There is mild intrahepatic biliary ductal dilatation and questionable stone in the common duct (2:58; 601:77) raising concern for choledocholithiasis  Recommend MRCP for further evaluation   There is a cystic outpouching from the gallbladder; gallbladder demonstrates calcifications in the wall  The patchy may represent a gallbladder diverticulum or a form of adenomyomatosis  Considering the above-mentioned choledocholithiasis, consider cholecystitis in the appropriate clinical setting  The study was marked in [facility] for immediate notification  Workstation performed: [id number]     Mri Abdomen Wo Contrast And Mrcp    Result Date: 4/6/2021  Impression: Improvement in intra and extrahepatic biliary ductal dilatation when comparing to CT examination of 4/4/2021 without filling defect within the CBD, suggesting recent passage of CBD stone  Cholelithiasis Workstation performed: [id number]       Complications: None    Discharge Diagnosis: choledocholithiasis and biliary pancreatitis s/p laparoscopic cholecystectomy    Resolved Problems  Date Reviewed: 4/4/2021    None        Principal Problem:    Choledocholithiasis      Condition at Discharge: good         Discharge instructions/Information to patient and family:   See after visit summary for information provided to patient and family  Provisions for Follow-Up Care:  See after visit summary for information related to follow-up care and any pertinent home health orders  PCP: No primary care provider on file  Disposition: See After Visit Summary for discharge disposition information  Planned Readmission: No    Discharge Statement   I spent 30 minutes discharging the patient  This time was spent on the day of discharge  I had direct contact with the patient on the day of discharge  Additional documentation is required if more than 30 minutes were spent on discharge  Discharge Medications:  See after visit summary for reconciled discharge medications provided to patient and family

## 2021-04-09 NOTE — UTILIZATION REVIEW
Notification of Discharge  This is a Notification of Discharge from our facility [address]  Please be advised that this patient has been discharge from our facility  Below you will find the admission and discharge date and time including the patients disposition  PRESENTATION DATE: 4/4/2021  1:18 AM  OBS ADMISSION DATE:   IP ADMISSION DATE: 4/5/21 1601   DISCHARGE DATE: 4/7/2021 12:21 PM  DISPOSITION: Home/Self Care Home/Self Care   Admission Orders listed below:  Admission Orders (From admission, onward)     Ordered        04/05/21 1601  Inpatient Admission  Once         04/04/21 0445  Place in Observation  Once                   Please contact the UR Department if additional information is required to close this patient's authorization/case  [address] [facility] Utilization Review Department  Main: [phone] x carefully listen to the prompts  All voicemails are confidential   [email or web address]  Send all requests for admission clinical reviews, approved or denied determinations and any other requests to dedicated fax number below belonging to the campus where the patient is receiving treatment   List of dedicated fax numbers:  [address] DENIALS (Administrative/Medical Necessity) [phone]   [address] (Maternity/NICU/Pediatrics) [phone]   Linsey Fang [phone]   Paola Pfeiffer [phone]   Toy Eduin [phone]   [address] [phone]   [facility]  [phone]   [address] [facility] [address]  [address] [facility] [address] [facility] [phone]

## 2021-04-17 ENCOUNTER — IMMUNIZATIONS (OUTPATIENT)
Dept: FAMILY MEDICINE CLINIC | Facility: HOSPITAL | Age: 46
End: 2021-04-17

## 2021-04-17 DIAGNOSIS — Z23 ENCOUNTER FOR IMMUNIZATION: Primary | ICD-10-CM

## 2021-04-17 PROCEDURE — 0002A SARS-COV-2 / COVID-19 MRNA VACCINE (PFIZER-BIONTECH) 30 MCG: CPT

## 2021-04-17 PROCEDURE — 91300 SARS-COV-2 / COVID-19 MRNA VACCINE (PFIZER-BIONTECH) 30 MCG: CPT

## 2021-04-21 ENCOUNTER — OFFICE VISIT (OUTPATIENT)
Dept: SURGERY | Facility: CLINIC | Age: 46
End: 2021-04-21

## 2021-04-21 DIAGNOSIS — K80.10 CALCULOUS CHOLECYSTITIS: Primary | ICD-10-CM

## 2021-04-21 PROCEDURE — 99024 POSTOP FOLLOW-UP VISIT: CPT | Performed by: SURGERY

## 2021-04-21 NOTE — PROGRESS NOTES
Assessment/Plan:    Diagnoses and all orders for this visit:    Calculous cholecystitis     status post laparoscopic cholecystectomy  Likely had an episode of some choledocholithiasis but MRCP revealed no common duct stone  May resume diet activity as tolerated  Pathology: Reviewed with patient, all questions answered  Postoperative restrictions reviewed  All questions answered  ______________________________________________________  HPI: Patient presents post operatively  Laparoscopic cholecystectomy 4/6/2021  Final Diagnosis  A  Gallbladder, cholecystectomy:  -  Patchy foci of low grade epithelial dysplasia in a background of chronic cholecystitis with cholelithiasis and nodular fibrosis of the wall with dystrophic calcifications, negative for features of high grade dysplasia or malignancy  -  2 benign lymph nodes with reactive changes present  ROS:  General ROS: negative for - chills, fatigue, fever or night sweats, weight loss  Respiratory ROS: no cough, shortness of breath, or wheezing  Cardiovascular ROS: no chest pain or dyspnea on exertion  Genito-Urinary ROS: no dysuria, trouble voiding, or hematuria  Musculoskeletal ROS: negative for - gait disturbance, joint pain or muscle pain  Neurological ROS: no TIA or stroke symptoms  GI ROS: see HPI  Skin ROS: no new rashes or lesions   Lymphatic ROS: no new adenopathy noted by pt  GYN ROS: see HPI, no new GYN history or bleeding noted  Psy ROS: no new mental or behavioral disturbances         Patient Active Problem List   Diagnosis    Choledocholithiasis       Allergies:  Amoxicillin    No current outpatient medications on file  No past medical history on file      Past Surgical History:   Procedure Laterality Date    CHOLECYSTECTOMY LAPAROSCOPIC N/A 4/6/2021    Procedure: CHOLECYSTECTOMY LAPAROSCOPIC;  Surgeon: Ileana Dillon DO;  [facility];  Service: General    ECTOPIC PREGNANCY SURGERY         No family history on file  reports that she has never smoked  She has never used smokeless tobacco  She reports current alcohol use  She reports current drug use  Drug: Marijuana      PHYSICAL EXAM    LMP 03/25/2021 (Exact Date)     General: normal, cooperative, no distress  Abdominal: soft, nondistended or nontender  Incision: clean, dry, and intact and healing well      Nelda Mitchell DO    Date: 4/21/2021 Time: 5:10 PM

## 2021-06-24 DIAGNOSIS — R10.9 ABDOMINAL PAIN: Primary | ICD-10-CM

## 2021-06-24 NOTE — PROGRESS NOTES
Return phone call  Negro Solano had her gallbladder removed in mid April  States some vague right upper quadrant and back pain off and on over the last 2 weeks  Has tried some papaya enzymes and occasional Pepcid  Concerned if there is anything postoperatively  Denies fevers or chills appetite has been down  However was able to eat something earlier today     highly unlikely there is any problems since her gallbladder surgery 2 months ago    Will order CMP and lipase and call her back with results

## 2021-06-25 ENCOUNTER — APPOINTMENT (OUTPATIENT)
Dept: LAB | Facility: CLINIC | Age: 46
End: 2021-06-25
Payer: COMMERCIAL

## 2021-06-25 DIAGNOSIS — R10.9 ABDOMINAL PAIN: ICD-10-CM

## 2021-06-25 LAB
ALBUMIN SERPL BCP-MCNC: 3.4 G/DL (ref 3.5–5)
ALP SERPL-CCNC: 109 U/L (ref 46–116)
ALT SERPL W P-5'-P-CCNC: 25 U/L (ref 12–78)
ANION GAP SERPL CALCULATED.3IONS-SCNC: 8 MMOL/L (ref 4–13)
AST SERPL W P-5'-P-CCNC: 19 U/L (ref 5–45)
BILIRUB SERPL-MCNC: 0.79 MG/DL (ref 0.2–1)
BUN SERPL-MCNC: 11 MG/DL (ref 5–25)
CALCIUM ALBUM COR SERPL-MCNC: 9.9 MG/DL (ref 8.3–10.1)
CALCIUM SERPL-MCNC: 9.4 MG/DL (ref 8.3–10.1)
CHLORIDE SERPL-SCNC: 102 MMOL/L (ref 100–108)
CO2 SERPL-SCNC: 25 MMOL/L (ref 21–32)
CREAT SERPL-MCNC: 0.68 MG/DL (ref 0.6–1.3)
GFR SERPL CREATININE-BSD FRML MDRD: 105 ML/MIN/1.73SQ M
GLUCOSE SERPL-MCNC: 81 MG/DL (ref 65–140)
LIPASE SERPL-CCNC: 113 U/L (ref 73–393)
POTASSIUM SERPL-SCNC: 4.4 MMOL/L (ref 3.5–5.3)
PROT SERPL-MCNC: 8.5 G/DL (ref 6.4–8.2)
SODIUM SERPL-SCNC: 135 MMOL/L (ref 136–145)

## 2021-06-25 PROCEDURE — 83690 ASSAY OF LIPASE: CPT

## 2021-06-25 PROCEDURE — 36415 COLL VENOUS BLD VENIPUNCTURE: CPT

## 2021-06-25 PROCEDURE — 80053 COMPREHEN METABOLIC PANEL: CPT

## 2021-06-30 ENCOUNTER — TELEPHONE (OUTPATIENT)
Dept: SURGERY | Facility: CLINIC | Age: 46
End: 2021-06-30

## 2021-06-30 NOTE — TELEPHONE ENCOUNTER
Called with CMP and lipase results  All liver enzymes are within normal limits  Gets only occasional intermittent pain  Recommend she try a PPI daily for a week    If things persist or next few weeks would entertain an MRCP

## 2021-09-18 ENCOUNTER — APPOINTMENT (EMERGENCY)
Dept: VASCULAR ULTRASOUND | Facility: HOSPITAL | Age: 46
End: 2021-09-18
Payer: COMMERCIAL

## 2021-09-18 ENCOUNTER — HOSPITAL ENCOUNTER (EMERGENCY)
Facility: HOSPITAL | Age: 46
Discharge: HOME | End: 2021-09-18
Attending: EMERGENCY MEDICINE | Admitting: EMERGENCY MEDICINE
Payer: COMMERCIAL

## 2021-09-18 VITALS
TEMPERATURE: 99.3 F | DIASTOLIC BLOOD PRESSURE: 90 MMHG | RESPIRATION RATE: 18 BRPM | OXYGEN SATURATION: 98 % | HEART RATE: 72 BPM | SYSTOLIC BLOOD PRESSURE: 160 MMHG

## 2021-09-18 DIAGNOSIS — I80.9 SUPERFICIAL THROMBOPHLEBITIS: Primary | ICD-10-CM

## 2021-09-18 DIAGNOSIS — R07.9 CHEST PAIN: ICD-10-CM

## 2021-09-18 LAB
ALBUMIN SERPL BCP-MCNC: 3.7 G/DL (ref 3.5–5)
ALP SERPL-CCNC: 89 U/L (ref 46–116)
ALT SERPL W P-5'-P-CCNC: 29 U/L (ref 12–78)
ANION GAP SERPL CALCULATED.3IONS-SCNC: 10 MMOL/L (ref 4–13)
AST SERPL W P-5'-P-CCNC: 22 U/L (ref 5–45)
BASOPHILS # BLD AUTO: 0.02 THOUSANDS/ΜL (ref 0–0.1)
BASOPHILS NFR BLD AUTO: 0 % (ref 0–1)
BILIRUB SERPL-MCNC: 0.67 MG/DL (ref 0.2–1)
BUN SERPL-MCNC: 13 MG/DL (ref 5–25)
CALCIUM SERPL-MCNC: 9.4 MG/DL (ref 8.3–10.1)
CHLORIDE SERPL-SCNC: 103 MMOL/L (ref 100–108)
CO2 SERPL-SCNC: 24 MMOL/L (ref 21–32)
CREAT SERPL-MCNC: 0.78 MG/DL (ref 0.6–1.3)
EOSINOPHIL # BLD AUTO: 0.02 THOUSAND/ΜL (ref 0–0.61)
EOSINOPHIL NFR BLD AUTO: 0 % (ref 0–6)
ERYTHROCYTE [DISTWIDTH] IN BLOOD BY AUTOMATED COUNT: 14.3 % (ref 11.6–15.1)
GFR SERPL CREATININE-BSD FRML MDRD: 91 ML/MIN/1.73SQ M
GLUCOSE SERPL-MCNC: 90 MG/DL (ref 65–140)
HCT VFR BLD AUTO: 40.2 % (ref 34.8–46.1)
HGB BLD-MCNC: 12.9 G/DL (ref 11.5–15.4)
IMM GRANULOCYTES # BLD AUTO: 0.02 THOUSAND/UL (ref 0–0.2)
IMM GRANULOCYTES NFR BLD AUTO: 0 % (ref 0–2)
LYMPHOCYTES # BLD AUTO: 1.36 THOUSANDS/ΜL (ref 0.6–4.47)
LYMPHOCYTES NFR BLD AUTO: 20 % (ref 14–44)
MCH RBC QN AUTO: 29 PG (ref 26.8–34.3)
MCHC RBC AUTO-ENTMCNC: 32.1 G/DL (ref 31.4–37.4)
MCV RBC AUTO: 90 FL (ref 82–98)
MONOCYTES # BLD AUTO: 0.49 THOUSAND/ΜL (ref 0.17–1.22)
MONOCYTES NFR BLD AUTO: 7 % (ref 4–12)
NEUTROPHILS # BLD AUTO: 4.97 THOUSANDS/ΜL (ref 1.85–7.62)
NEUTS SEG NFR BLD AUTO: 73 % (ref 43–75)
NRBC BLD AUTO-RTO: 0 /100 WBCS
PLATELET # BLD AUTO: 319 THOUSANDS/UL (ref 149–390)
PMV BLD AUTO: 10.1 FL (ref 8.9–12.7)
POTASSIUM SERPL-SCNC: 3.9 MMOL/L (ref 3.5–5.3)
PROT SERPL-MCNC: 8.5 G/DL (ref 6.4–8.2)
RBC # BLD AUTO: 4.45 MILLION/UL (ref 3.81–5.12)
SODIUM SERPL-SCNC: 137 MMOL/L (ref 136–145)
TROPONIN I SERPL-MCNC: <0.02 NG/ML
TSH SERPL DL<=0.05 MIU/L-ACNC: 1.77 UIU/ML (ref 0.36–3.74)
WBC # BLD AUTO: 6.88 THOUSAND/UL (ref 4.31–10.16)

## 2021-09-18 PROCEDURE — 93971 EXTREMITY STUDY: CPT

## 2021-09-18 PROCEDURE — 84484 ASSAY OF TROPONIN QUANT: CPT | Performed by: EMERGENCY MEDICINE

## 2021-09-18 PROCEDURE — 85025 COMPLETE CBC W/AUTO DIFF WBC: CPT | Performed by: EMERGENCY MEDICINE

## 2021-09-18 PROCEDURE — 99284 EMERGENCY DEPT VISIT MOD MDM: CPT

## 2021-09-18 PROCEDURE — 99284 EMERGENCY DEPT VISIT MOD MDM: CPT | Performed by: EMERGENCY MEDICINE

## 2021-09-18 PROCEDURE — 80053 COMPREHEN METABOLIC PANEL: CPT | Performed by: EMERGENCY MEDICINE

## 2021-09-18 PROCEDURE — 36415 COLL VENOUS BLD VENIPUNCTURE: CPT | Performed by: EMERGENCY MEDICINE

## 2021-09-18 PROCEDURE — 93005 ELECTROCARDIOGRAM TRACING: CPT

## 2021-09-18 PROCEDURE — 93971 EXTREMITY STUDY: CPT | Performed by: SURGERY

## 2021-09-18 PROCEDURE — 84443 ASSAY THYROID STIM HORMONE: CPT | Performed by: EMERGENCY MEDICINE

## 2021-09-18 RX ORDER — NAPROXEN 500 MG/1
500 TABLET ORAL 2 TIMES DAILY
Qty: 15 TABLET | Refills: 0 | Status: SHIPPED | OUTPATIENT
Start: 2021-09-18 | End: 2021-09-20 | Stop reason: HOSPADM

## 2021-09-18 NOTE — ED PROVIDER NOTES
History  Chief Complaint   Patient presents with    Leg Pain     C/o LLE cramping starting last night  Pt fell onto left side one week ago, admits leg pain since then  Pt referred by urgent care for r/o DVT  Additionally, pt c/o numbness in all fingers and toes/chest pressure/headaches  Marietta Patton is a 50 y o  female who presents primarily with concerns over some swelling and pain in the left lower extremity  She reports she fell one week ago (had an issue with the heel of one of her shoes) and fell onto her left side  She was seen at an urgent care and sent here to rule out DVT  Additionally she reports over the past 3 days she has had intermittent chest pains and numbness and tingling in her fingers  She has no known medical problems and takes no daily medications  She has a remote history for cholecystectomty and surgery for an ectopic pregnancy  History provided by:  Patient   used: No    Leg Pain  Location:  Leg  Time since incident:  1 week  Injury: yes    Mechanism of injury: fall    Fall:     Fall occurred:  Tripped    Impact surface: left side of body  Leg location:  L lower leg  Pain details:     Quality:  Aching and burning    Radiates to:  Does not radiate    Severity:  Moderate    Onset quality:  Gradual    Duration:  1 week    Timing:  Constant    Progression:  Unchanged  Chronicity:  New  Dislocation: no    Prior injury to area:  No  Relieved by:  Rest  Exacerbated by: palpation  Ineffective treatments:  None tried  Associated symptoms: fatigue and swelling (mild)    Associated symptoms: no fever    Risk factors: obesity        None       History reviewed  No pertinent past medical history  Past Surgical History:   Procedure Laterality Date    CHOLECYSTECTOMY LAPAROSCOPIC N/A 4/6/2021    Procedure: CHOLECYSTECTOMY LAPAROSCOPIC;  Surgeon: Rosio Marcelino DO;  Location: [facility];  Service: General    ECTOPIC PREGNANCY SURGERY         History reviewed   No pertinent family history  I have reviewed and agree with the history as documented  E-Cigarette/Vaping    E-Cigarette Use Current Some Day User     Comments CBD      E-Cigarette/Vaping Substances     Social History     Tobacco Use    Smoking status: Never Smoker    Smokeless tobacco: Never Used   Vaping Use    Vaping Use: Some days   Substance Use Topics    Alcohol use: Yes     Comment: socially    Drug use: Yes     Types: Marijuana       Review of Systems   Constitutional: Positive for fatigue  Negative for chills, diaphoresis and fever  Respiratory: Negative for shortness of breath  Cardiovascular: Positive for chest pain and leg swelling (mild left lower leg)  Negative for palpitations  Gastrointestinal: Negative for diarrhea, nausea and vomiting  Genitourinary: Negative for dysuria and frequency  Musculoskeletal: Positive for arthralgias (left lower leg)  Skin: Negative for rash  Neurological: Positive for numbness  All other systems reviewed and are negative  Physical Exam  Physical Exam  Vitals and nursing note reviewed  Constitutional:       General: She is in acute distress  Appearance: She is well-developed  She is obese  HENT:      Head: Normocephalic and atraumatic  Eyes:      Pupils: Pupils are equal, round, and reactive to light  Neck:      Vascular: No JVD  Cardiovascular:      Rate and Rhythm: Normal rate and regular rhythm  Heart sounds: Normal heart sounds  No murmur heard  No friction rub  No gallop  Pulmonary:      Effort: Pulmonary effort is normal  No respiratory distress  Breath sounds: Normal breath sounds  No wheezing or rales  Chest:      Chest wall: No tenderness  Musculoskeletal:         General: Tenderness (mild ttp superior aspect calf) present  No swelling  Normal range of motion  Cervical back: Normal range of motion  Skin:     General: Skin is warm and dry  Findings: Erythema present     Neurological: General: No focal deficit present  Mental Status: She is alert and oriented to person, place, and time  Psychiatric:         Behavior: Behavior normal          Thought Content:  Thought content normal          Judgment: Judgment normal          Vital Signs  ED Triage Vitals [09/18/21 1136]   Temperature Pulse Respirations Blood Pressure SpO2   99 3 °F (37 4 °C) 72 18 160/90 98 %      Temp Source Heart Rate Source Patient Position - Orthostatic VS BP Location FiO2 (%)   Oral Monitor Sitting Left arm --      Pain Score       2           Vitals:    09/18/21 1136   BP: 160/90   Pulse: 72   Patient Position - Orthostatic VS: Sitting         Visual Acuity      ED Medications  Medications - No data to display    Diagnostic Studies  Results Reviewed     Procedure Component Value Units Date/Time    Comprehensive metabolic panel [[id number]]  (Abnormal) Collected: 09/18/21 1214    Lab Status: Final result Specimen: Blood from Arm, Right Updated: 09/18/21 1310     Sodium 137 mmol/L      Potassium 3 9 mmol/L      Chloride 103 mmol/L      CO2 24 mmol/L      ANION GAP 10 mmol/L      BUN 13 mg/dL      Creatinine 0 78 mg/dL      Glucose 90 mg/dL      Calcium 9 4 mg/dL      AST 22 U/L      ALT 29 U/L      Alkaline Phosphatase 89 U/L      Total Protein 8 5 g/dL      Albumin 3 7 g/dL      Total Bilirubin 0 67 mg/dL      eGFR 91 ml/min/1 73sq m     Narrative:      Charles guidelines for Chronic Kidney Disease (CKD):     Stage 1 with normal or high GFR (GFR > 90 mL/min/1 73 square meters)    Stage 2 Mild CKD (GFR = 60-89 mL/min/1 73 square meters)    Stage 3A Moderate CKD (GFR = 45-59 mL/min/1 73 square meters)    Stage 3B Moderate CKD (GFR = 30-44 mL/min/1 73 square meters)    Stage 4 Severe CKD (GFR = 15-29 mL/min/1 73 square meters)    Stage 5 End Stage CKD (GFR <15 mL/min/1 73 square meters)  Note: GFR calculation is accurate only with a steady state creatinine    TSH [[id number]]  (Normal) Collected: 09/18/21 1214    Lab Status: Final result Specimen: Blood from Arm, Right Updated: 09/18/21 1310     TSH 3RD GENERATON 1 772 uIU/mL     Narrative:      Patients undergoing fluorescein dye angiography may retain small amounts of fluorescein in the body for 48-72 hours post procedure  Samples containing fluorescein can produce falsely depressed TSH values  If the patient had this procedure,a specimen should be resubmitted post fluorescein clearance        Troponin I [[id number]]  (Normal) Collected: 09/18/21 1214    Lab Status: Final result Specimen: Blood from Arm, Right Updated: 09/18/21 1258     Troponin I <0 02 ng/mL     CBC and differential [[id number]] Collected: 09/18/21 1214    Lab Status: Final result Specimen: Blood from Arm, Right Updated: 09/18/21 1232     WBC 6 88 Thousand/uL      RBC 4 45 Million/uL      Hemoglobin 12 9 g/dL      Hematocrit 40 2 %      MCV 90 fL      MCH 29 0 pg      MCHC 32 1 g/dL      RDW 14 3 %      MPV 10 1 fL      Platelets 032 Thousands/uL      nRBC 0 /100 WBCs      Neutrophils Relative 73 %      Immat GRANS % 0 %      Lymphocytes Relative 20 %      Monocytes Relative 7 %      Eosinophils Relative 0 %      Basophils Relative 0 %      Neutrophils Absolute 4 97 Thousands/µL      Immature Grans Absolute 0 02 Thousand/uL      Lymphocytes Absolute 1 36 Thousands/µL      Monocytes Absolute 0 49 Thousand/µL      Eosinophils Absolute 0 02 Thousand/µL      Basophils Absolute 0 02 Thousands/µL                  VAS lower limb venous duplex study, unilateral/limited    (Results Pending)              Procedures  ECG 12 Lead Documentation Only    Date/Time: 9/18/2021 12:44 PM  Performed by: Conrad Turcios MD  Authorized by: Conrad Turcios MD     Indications / Diagnosis:  Chest pain  ECG reviewed by me, the ED Provider: yes    Patient location:  ED  Previous ECG:     Previous ECG:  Compared to current    Comparison ECG info:  4/4/21    Similarity:  No change  Interpretation: Interpretation: normal    Rate:     ECG rate:  64    ECG rate assessment: normal    Rhythm:     Rhythm: sinus rhythm    Ectopy:     Ectopy: none    QRS:     QRS axis:  Normal    QRS intervals:  Normal  Conduction:     Conduction: normal    ST segments:     ST segments:  Normal  T waves:     T waves: normal               ED Course             HEART Risk Score      Most Recent Value   Heart Score Risk Calculator   History  0 Filed at: 09/18/2021 1330   ECG  0 Filed at: 09/18/2021 1330   Age  1 Filed at: 09/18/2021 1330   Risk Factors  1 Filed at: 09/18/2021 1330   Troponin  0 Filed at: 09/18/2021 1330   HEART Score  2 Filed at: 09/18/2021 1330                                    MDM  Number of Diagnoses or Management Options  Chest pain: new and requires workup  Superficial thrombophlebitis: new and requires workup  Diagnosis management comments: Background: 50 y o  female with chest pain    Differential DX includes but is not limited to: acs/mi, pe, pleurisy, dissection, pneumonia, musculoskeletal chest pain    Plan: cardiac workup    AND    50 y o  female presents with chief complaint of left  calf pain and swelling      Differential: dvt, superficial thrombophlebitis, baker's cyst, musculoskeletal calf pain         Amount and/or Complexity of Data Reviewed  Clinical lab tests: ordered and reviewed  Tests in the radiology section of CPT®: ordered and reviewed (No DVT - superficial thrombophlebitis)    Risk of Complications, Morbidity, and/or Mortality  Presenting problems: high  Diagnostic procedures: high  Management options: high    Patient Progress  Patient progress: stable      Disposition  Final diagnoses:   Superficial thrombophlebitis   Chest pain     Time reflects when diagnosis was documented in both MDM as applicable and the Disposition within this note     Time User Action Codes Description Comment    9/18/2021  1:29 PM Subha Gonzalez Add [I80 9] Superficial thrombophlebitis     9/18/2021  1:29 PM Bozena Mueller Guanaco Hale Add [R07 9] Chest pain       ED Disposition     ED Disposition Condition Date/Time Comment    Discharge Stable Sat Sep 18, 2021  1:28 PM Gracia Montague discharge to home/self care  Follow-up Information     Follow up With Specialties Details Why Contact Info    [employer]  Call  to establish care with a primary care physician [phone]            Patient's Medications   Discharge Prescriptions    NAPROXEN (NAPROSYN) 500 MG TABLET    Take 1 tablet (500 mg total) by mouth 2 (two) times a day for 15 doses       Start Date: 9/18/2021 End Date: 9/26/2021       Order Dose: 500 mg       Quantity: 15 tablet    Refills: 0     No discharge procedures on file      PDMP Review       Value Time User    PDMP Reviewed  Yes 4/4/2021  1:12 AM Janes Harris MD          ED Provider  Electronically Signed by           Brandon Ceja MD  09/18/21 5328

## 2021-09-19 ENCOUNTER — HOSPITAL ENCOUNTER (OUTPATIENT)
Facility: HOSPITAL | Age: 46
Setting detail: OBSERVATION
Discharge: HOME | End: 2021-09-20
Attending: EMERGENCY MEDICINE | Admitting: INTERNAL MEDICINE
Payer: COMMERCIAL

## 2021-09-19 DIAGNOSIS — I80.9 SUPERFICIAL THROMBOPHLEBITIS: Primary | ICD-10-CM

## 2021-09-19 PROBLEM — I80.00: Status: ACTIVE | Noted: 2021-09-19

## 2021-09-19 PROBLEM — R20.0 NUMBNESS AND TINGLING OF LEFT UPPER AND LOWER EXTREMITY: Status: ACTIVE | Noted: 2021-09-19

## 2021-09-19 PROBLEM — R20.2 NUMBNESS AND TINGLING OF LEFT UPPER AND LOWER EXTREMITY: Status: ACTIVE | Noted: 2021-09-19

## 2021-09-19 LAB
ALBUMIN SERPL BCP-MCNC: 3.7 G/DL (ref 3.5–5)
ALP SERPL-CCNC: 84 U/L (ref 46–116)
ALT SERPL W P-5'-P-CCNC: 22 U/L (ref 12–78)
ANION GAP SERPL CALCULATED.3IONS-SCNC: 12 MMOL/L (ref 4–13)
APTT PPP: 23 SECONDS (ref 23–37)
AST SERPL W P-5'-P-CCNC: 12 U/L (ref 5–45)
ATRIAL RATE: 67 BPM
BASOPHILS # BLD AUTO: 0.02 THOUSANDS/ΜL (ref 0–0.1)
BASOPHILS NFR BLD AUTO: 0 % (ref 0–1)
BILIRUB SERPL-MCNC: 0.35 MG/DL (ref 0.2–1)
BUN SERPL-MCNC: 20 MG/DL (ref 5–25)
CALCIUM SERPL-MCNC: 8.8 MG/DL (ref 8.3–10.1)
CHLORIDE SERPL-SCNC: 104 MMOL/L (ref 100–108)
CO2 SERPL-SCNC: 24 MMOL/L (ref 21–32)
CREAT SERPL-MCNC: 0.81 MG/DL (ref 0.6–1.3)
EOSINOPHIL # BLD AUTO: 0.04 THOUSAND/ΜL (ref 0–0.61)
EOSINOPHIL NFR BLD AUTO: 1 % (ref 0–6)
ERYTHROCYTE [DISTWIDTH] IN BLOOD BY AUTOMATED COUNT: 14.5 % (ref 11.6–15.1)
GFR SERPL CREATININE-BSD FRML MDRD: 87 ML/MIN/1.73SQ M
GLUCOSE SERPL-MCNC: 106 MG/DL (ref 65–140)
HCT VFR BLD AUTO: 39.6 % (ref 34.8–46.1)
HGB BLD-MCNC: 12.6 G/DL (ref 11.5–15.4)
IMM GRANULOCYTES # BLD AUTO: 0.03 THOUSAND/UL (ref 0–0.2)
IMM GRANULOCYTES NFR BLD AUTO: 0 % (ref 0–2)
INR PPP: 0.91 (ref 0.84–1.19)
LYMPHOCYTES # BLD AUTO: 1.66 THOUSANDS/ΜL (ref 0.6–4.47)
LYMPHOCYTES NFR BLD AUTO: 20 % (ref 14–44)
MCH RBC QN AUTO: 28.8 PG (ref 26.8–34.3)
MCHC RBC AUTO-ENTMCNC: 31.8 G/DL (ref 31.4–37.4)
MCV RBC AUTO: 91 FL (ref 82–98)
MONOCYTES # BLD AUTO: 0.58 THOUSAND/ΜL (ref 0.17–1.22)
MONOCYTES NFR BLD AUTO: 7 % (ref 4–12)
NEUTROPHILS # BLD AUTO: 5.93 THOUSANDS/ΜL (ref 1.85–7.62)
NEUTS SEG NFR BLD AUTO: 72 % (ref 43–75)
NRBC BLD AUTO-RTO: 0 /100 WBCS
P AXIS: 46 DEGREES
PLATELET # BLD AUTO: 312 THOUSANDS/UL (ref 149–390)
PMV BLD AUTO: 10.1 FL (ref 8.9–12.7)
POTASSIUM SERPL-SCNC: 4 MMOL/L (ref 3.5–5.3)
PR INTERVAL: 150 MS
PROT SERPL-MCNC: 7.6 G/DL (ref 6.4–8.2)
PROTHROMBIN TIME: 12.2 SECONDS (ref 11.6–14.5)
QRS AXIS: 3 DEGREES
QRSD INTERVAL: 76 MS
QT INTERVAL: 392 MS
QTC INTERVAL: 405 MS
RBC # BLD AUTO: 4.37 MILLION/UL (ref 3.81–5.12)
SODIUM SERPL-SCNC: 140 MMOL/L (ref 136–145)
T WAVE AXIS: 14 DEGREES
VENTRICULAR RATE: 64 BPM
WBC # BLD AUTO: 8.26 THOUSAND/UL (ref 4.31–10.16)

## 2021-09-19 PROCEDURE — 80053 COMPREHEN METABOLIC PANEL: CPT | Performed by: EMERGENCY MEDICINE

## 2021-09-19 PROCEDURE — 36415 COLL VENOUS BLD VENIPUNCTURE: CPT | Performed by: EMERGENCY MEDICINE

## 2021-09-19 PROCEDURE — 96374 THER/PROPH/DIAG INJ IV PUSH: CPT

## 2021-09-19 PROCEDURE — 99285 EMERGENCY DEPT VISIT HI MDM: CPT | Performed by: EMERGENCY MEDICINE

## 2021-09-19 PROCEDURE — 85610 PROTHROMBIN TIME: CPT | Performed by: EMERGENCY MEDICINE

## 2021-09-19 PROCEDURE — 85025 COMPLETE CBC W/AUTO DIFF WBC: CPT | Performed by: EMERGENCY MEDICINE

## 2021-09-19 PROCEDURE — 93010 ELECTROCARDIOGRAM REPORT: CPT | Performed by: INTERNAL MEDICINE

## 2021-09-19 PROCEDURE — 85730 THROMBOPLASTIN TIME PARTIAL: CPT | Performed by: EMERGENCY MEDICINE

## 2021-09-19 PROCEDURE — 99284 EMERGENCY DEPT VISIT MOD MDM: CPT

## 2021-09-19 PROCEDURE — 99220 PR INITIAL OBSERVATION CARE/DAY 70 MINUTES: CPT | Performed by: INTERNAL MEDICINE

## 2021-09-19 RX ORDER — ACETAMINOPHEN 325 MG/1
650 TABLET ORAL EVERY 6 HOURS PRN
Status: DISCONTINUED | OUTPATIENT
Start: 2021-09-19 | End: 2021-09-20 | Stop reason: HOSPADM

## 2021-09-19 RX ORDER — KETOROLAC TROMETHAMINE 30 MG/ML
15 INJECTION, SOLUTION INTRAMUSCULAR; INTRAVENOUS ONCE
Status: COMPLETED | OUTPATIENT
Start: 2021-09-19 | End: 2021-09-19

## 2021-09-19 RX ORDER — DOCUSATE SODIUM 100 MG/1
100 CAPSULE, LIQUID FILLED ORAL 2 TIMES DAILY
Status: DISCONTINUED | OUTPATIENT
Start: 2021-09-20 | End: 2021-09-20 | Stop reason: HOSPADM

## 2021-09-19 RX ORDER — CALCIUM CARBONATE 200(500)MG
1000 TABLET,CHEWABLE ORAL DAILY PRN
Status: DISCONTINUED | OUTPATIENT
Start: 2021-09-19 | End: 2021-09-20 | Stop reason: HOSPADM

## 2021-09-19 RX ORDER — IBUPROFEN 400 MG/1
400 TABLET ORAL EVERY 6 HOURS PRN
Status: DISCONTINUED | OUTPATIENT
Start: 2021-09-19 | End: 2021-09-20

## 2021-09-19 RX ORDER — ONDANSETRON 2 MG/ML
4 INJECTION INTRAMUSCULAR; INTRAVENOUS EVERY 6 HOURS PRN
Status: DISCONTINUED | OUTPATIENT
Start: 2021-09-19 | End: 2021-09-20 | Stop reason: HOSPADM

## 2021-09-19 RX ADMIN — ENOXAPARIN SODIUM 120 MG: 120 INJECTION SUBCUTANEOUS at 23:19

## 2021-09-19 RX ADMIN — KETOROLAC TROMETHAMINE 15 MG: 30 INJECTION, SOLUTION INTRAMUSCULAR at 21:10

## 2021-09-19 NOTE — Clinical Note
Junie Walters was seen and treated in our emergency department on 9/19/2021  Diagnosis:     Shahid Espinoza    She may return on this date: If you have any questions or concerns, please don't hesitate to call        Shawn Merino MD    ______________________________           _______________          _______________  Hospital Representative                              Date                                Time

## 2021-09-19 NOTE — Clinical Note
Case was discussed with Dr Sushant Souza  and the patient's admission status was agreed to be Admission Status: observation status to the service of Dr Jack Wheeler

## 2021-09-19 NOTE — Clinical Note
Pop Mcdonald was seen and treated in our emergency department on 9/19/2021  Other - See Comments    Has to have leg raised while sitting  Please allow for 20-30 min walks duirng the work day  Diagnosis:     Jacey Benitez  may return to school on return date  She may return on this date: 09/23/2021         If you have any questions or concerns, please don't hesitate to call        Ebenezer Morris RN    ______________________________           _______________          _______________  Hospital Representative                              Date                                Time

## 2021-09-20 VITALS
HEIGHT: 64 IN | OXYGEN SATURATION: 97 % | WEIGHT: 264 LBS | HEART RATE: 68 BPM | TEMPERATURE: 98.8 F | RESPIRATION RATE: 16 BRPM | BODY MASS INDEX: 45.07 KG/M2 | DIASTOLIC BLOOD PRESSURE: 74 MMHG | SYSTOLIC BLOOD PRESSURE: 134 MMHG

## 2021-09-20 PROBLEM — Z90.49 STATUS POST CHOLECYSTECTOMY: Status: ACTIVE | Noted: 2021-09-20

## 2021-09-20 LAB
ANION GAP SERPL CALCULATED.3IONS-SCNC: 6 MMOL/L (ref 4–13)
BASOPHILS # BLD AUTO: 0.02 THOUSANDS/ΜL (ref 0–0.1)
BASOPHILS NFR BLD AUTO: 0 % (ref 0–1)
BUN SERPL-MCNC: 17 MG/DL (ref 5–25)
CALCIUM SERPL-MCNC: 8.2 MG/DL (ref 8.3–10.1)
CHLORIDE SERPL-SCNC: 107 MMOL/L (ref 100–108)
CO2 SERPL-SCNC: 25 MMOL/L (ref 21–32)
CREAT SERPL-MCNC: 0.76 MG/DL (ref 0.6–1.3)
EOSINOPHIL # BLD AUTO: 0.05 THOUSAND/ΜL (ref 0–0.61)
EOSINOPHIL NFR BLD AUTO: 1 % (ref 0–6)
ERYTHROCYTE [DISTWIDTH] IN BLOOD BY AUTOMATED COUNT: 14.5 % (ref 11.6–15.1)
EST. AVERAGE GLUCOSE BLD GHB EST-MCNC: 103 MG/DL
GFR SERPL CREATININE-BSD FRML MDRD: 94 ML/MIN/1.73SQ M
GLUCOSE SERPL-MCNC: 88 MG/DL (ref 65–140)
HBA1C MFR BLD: 5.2 %
HCT VFR BLD AUTO: 35 % (ref 34.8–46.1)
HGB BLD-MCNC: 11.2 G/DL (ref 11.5–15.4)
IMM GRANULOCYTES # BLD AUTO: 0.02 THOUSAND/UL (ref 0–0.2)
IMM GRANULOCYTES NFR BLD AUTO: 0 % (ref 0–2)
LYMPHOCYTES # BLD AUTO: 2.65 THOUSANDS/ΜL (ref 0.6–4.47)
LYMPHOCYTES NFR BLD AUTO: 39 % (ref 14–44)
MCH RBC QN AUTO: 29.2 PG (ref 26.8–34.3)
MCHC RBC AUTO-ENTMCNC: 32 G/DL (ref 31.4–37.4)
MCV RBC AUTO: 91 FL (ref 82–98)
MONOCYTES # BLD AUTO: 0.68 THOUSAND/ΜL (ref 0.17–1.22)
MONOCYTES NFR BLD AUTO: 10 % (ref 4–12)
NEUTROPHILS # BLD AUTO: 3.36 THOUSANDS/ΜL (ref 1.85–7.62)
NEUTS SEG NFR BLD AUTO: 50 % (ref 43–75)
NRBC BLD AUTO-RTO: 0 /100 WBCS
PLATELET # BLD AUTO: 254 THOUSANDS/UL (ref 149–390)
PMV BLD AUTO: 9.9 FL (ref 8.9–12.7)
POTASSIUM SERPL-SCNC: 4 MMOL/L (ref 3.5–5.3)
RBC # BLD AUTO: 3.84 MILLION/UL (ref 3.81–5.12)
SODIUM SERPL-SCNC: 138 MMOL/L (ref 136–145)
WBC # BLD AUTO: 6.78 THOUSAND/UL (ref 4.31–10.16)

## 2021-09-20 PROCEDURE — 83036 HEMOGLOBIN GLYCOSYLATED A1C: CPT | Performed by: INTERNAL MEDICINE

## 2021-09-20 PROCEDURE — 80048 BASIC METABOLIC PNL TOTAL CA: CPT

## 2021-09-20 PROCEDURE — 36415 COLL VENOUS BLD VENIPUNCTURE: CPT

## 2021-09-20 PROCEDURE — 99217 PR OBSERVATION CARE DISCHARGE MANAGEMENT: CPT | Performed by: INTERNAL MEDICINE

## 2021-09-20 PROCEDURE — 85025 COMPLETE CBC W/AUTO DIFF WBC: CPT

## 2021-09-20 RX ORDER — ACETAMINOPHEN 325 MG/1
650 TABLET ORAL EVERY 6 HOURS
Qty: 240 TABLET | Refills: 0 | Status: SHIPPED | OUTPATIENT
Start: 2021-09-20 | End: 2021-09-20

## 2021-09-20 RX ORDER — ACETAMINOPHEN 325 MG/1
650 TABLET ORAL EVERY 6 HOURS
Qty: 240 TABLET | Refills: 0 | Status: SHIPPED | OUTPATIENT
Start: 2021-09-20 | End: 2021-10-20

## 2021-09-20 RX ADMIN — ENOXAPARIN SODIUM 40 MG: 40 INJECTION SUBCUTANEOUS at 08:43

## 2021-09-20 RX ADMIN — DICLOFENAC SODIUM 2 G: 10 GEL TOPICAL at 08:43

## 2021-09-20 NOTE — ASSESSMENT & PLAN NOTE
· Patient's presentation is consistent with superficial thrombophlebitis ( see pictures in H&P)  · Patient may have thrombophlebitis present in her right lower extremity as well  ·  Given patient's severe varicosities and slow resolution of thrombophlebitis we will consider short course of anticoagulation       Plans:    · Start Xeralto 10 mg QD for 45 days   · Avoid NSAID  · Patient educated to use tylenol in the interm  · Warm compression and leg elevation  · Encourage patient to ambulate  · Monitor vitals

## 2021-09-20 NOTE — ASSESSMENT & PLAN NOTE
· Patient had a history of choledocholithiasis and she underwent cholecystectomy in April 2021  She tolerated well the surgery  · Denies abdominal pain, nausea, vomiting, fever, chills

## 2021-09-20 NOTE — DISCHARGE INSTRUCTIONS
your healthcare provider  Do not skip does or take less than prescribed  Tell your provider right away if you forget to take your blood thinner, or if you take too much  ? Warfarin  is a blood thinner that you may need to take  The following are things you should be aware of if you take warfarin:     § Foods and medicines can affect the amount of warfarin in your blood  Do not make major changes to your diet while you take warfarin  Warfarin works best when you eat about the same amount of vitamin K every day  Vitamin K is found in green leafy vegetables and certain other foods  Ask for more information about what to eat when you are taking warfarin  § You will need to see your healthcare provider for follow-up visits when you are on warfarin  You will need regular blood tests  These tests are used to decide how much medicine you need  · Antiplatelets , such as aspirin, help prevent blood clots  Take your antiplatelet medicine exactly as directed  These medicines make it more likely for you to bleed or bruise  If you are told to take aspirin, do not take acetaminophen or ibuprofen instead  · Take your medicine as directed  Contact your healthcare provider if you think your medicine is not helping or if you have side effects  Tell him of her if you are allergic to any medicine  Keep a list of the medicines, vitamins, and herbs you take  Include the amounts, and when and why you take them  Bring the list or the pill bottles to follow-up visits  Carry your medicine list with you in case of an emergency  Manage STP:   · Apply a warm compress to your arm or leg  This will help decrease swelling and pain  Wet a washcloth in warm water  Do not  use hot water  Apply the warm compress for 10 minutes  Repeat this 4 times each day  · Wear pressure stockings as directed  Pressure stockings improve blood flow and help prevent clots in your legs  Wear the stockings during the day   Do not wear them when you sleep          · Elevate your leg or arm above the level of your heart as often as you can  This will help decrease swelling and pain  Prop your leg or arm on pillows or blankets to keep it elevated comfortably  Prevent STP:   · Maintain a healthy weight  This will help decrease your risk for another blood clot  Ask your healthcare provider how much you should weigh  Ask him or her to help you create a weight loss plan if you are overweight  · Do not smoke  Nicotine and other chemicals in cigarettes and cigars can damage blood vessels and increase your risk for blood clots  Ask your healthcare provider for information if you currently smoke and need help to quit  E-cigarettes or smokeless tobacco still contain nicotine  Talk to your healthcare provider before you use these products  · Change your body position or move around often  Move and stretch in your seat several times each hour if you travel by car or work at a desk  In an airplane, get up and walk every hour  Move your legs by tightening and releasing your leg muscles while sitting  You can move your legs while sitting by raising and lowering your heels  Keep your toes on the floor while you do this  You can also raise and lower your toes while keeping your heels on the floor  · Exercise regularly to help increase your blood flow  Walking is a good low-impact exercise  Talk to your healthcare provider about the best exercise plan for you  · Do not inject illegal drugs  Talk to your healthcare provider if you use IV drugs and need help to quit  Follow up with your doctor or hematologist as directed: You may need to come in regularly for scans to check for blood clots  Your blood may checked to see how long it takes to clot  Your doctor or specialist will tell you if you need to have this test and how often to have it  Write down your questions so you remember to ask them during your visits    © Copyright [employer] 2021 Information is for End User's use only and may not be sold, redistributed or otherwise used for commercial purposes  All illustrations and images included in CareNotes® are the copyrighted property of [employer]  or [employer]  The above information is an  only  It is not intended as medical advice for individual conditions or treatments  Talk to your doctor, nurse or pharmacist before following any medical regimen to see if it is safe and effective for you

## 2021-09-20 NOTE — ASSESSMENT & PLAN NOTE
· Patient noticed a lump on the upper and posterior aspect of left calf long with erythema on the area on 9/17  She reported that she had a mechanical fall on 9/9 in her office and she fell on her left side of the body  She did not notice any visible injury at that time  · She described the dull aching discomfort over the lump was about 5/10 in the beginning  For which she came into the ED on 9/18 and she had the lower extremity venous duplex study did not show thrombus in common femoral vein in right lower extremity  Findings suggest acute occlusive superficial thrombophlebitis of varicosities in the upper posterior calf  No evidence of acute or chronic deep vein thrombosis  Thus, she was given naproxen and discharged to home  · On 9/19, she noticed redness area has been enlarging up to the lower part of left thigh with the same amount of discomfort without fever or chills  · On physical exam, peripheral pulses are intact, joints range of motion are normal   Multiple varicose veins are noticed bilaterally  Found a new redness on the lower part of right thigh at the time of physical exam   For which she have not noticed any pain or discomfort  No edema bilaterally  · The affected areas do not look like cellulitis  She denies fever/chills  No leukocytosis  · Etiology:  Most likely related to recent mechanical fall in the setting of DVT being ruled out by venous duplex study  Plans:    · Pain medication ( acetaminophen and ibuprofen) p r n    · Warm compression and leg elevation  · Continue DVT prophylaxis with Lovenox  · Encourage patient to ambulate  · Monitor vitals  · Remind patient to notify healthcare providers if she have new onset of chest pain or shortness of breath or increasing pain on the affected areas

## 2021-09-20 NOTE — H&P
[facility]  H&P- Yanely Dey 1975, 50 y o  female MRN: [id number]  Unit/Bed#: ED 25 Encounter: [id number]  Primary Care Provider: No primary care provider on file  Date and time admitted to hospital: 9/19/2021  8:34 PM    * Superficial thrombophlebitis of lower leg  Assessment & Plan  · Patient noticed a lump on the upper and posterior aspect of left calf with erythema on the area on 9/17  She reported that she had a mechanical fall on 9/9 in her office and she fell on her left side of the body  She did not notice any visible injury at that time  · She described the dull aching discomfort over the lump was about 5/10 in the beginning  For which she came into the ED on 9/18 and she had the lower extremity venous duplex study did not show thrombus in common femoral vein in right lower extremity  Findings suggest acute occlusive superficial thrombophlebitis of varicosities in the upper posterior calf  No evidence of acute or chronic deep vein thrombosis  Thus, she was given naproxen and discharged to home  · On 9/19, she noticed redness area has been enlarging up to the lower part of left thigh with the same amount of discomfort without fever or chills  · On physical exam, peripheral pulses are intact, joints range of motion are normal   Multiple varicose veins are noticed bilaterally  Found a new redness on the lower part of right thigh at the time of physical exam   For which she have not noticed any pain or discomfort  No edema bilaterally  · The affected areas do not look like cellulitis  She denies fever/chills  No leukocytosis  · Etiology:  Most likely related to recent mechanical fall in the setting of DVT being ruled out by venous duplex study  Also consider side effect of [place] bark (Ease pain) herbal medicine for pain  Plans:    · Pain medication ( acetaminophen and ibuprofen) p r n    · Warm compression and leg elevation  · Continue DVT prophylaxis with Lovenox  · Encourage patient to ambulate  · Monitor vitals  · Remind patient to notify healthcare providers if she have new onset of chest pain or shortness of breath or increasing pain on the affected areas    Numbness and tingling of left upper and lower extremity  Assessment & Plan  · Patient complains of tingling on the hand and foot occasionally during the last 3 days without any other sensory and motor deficit  She has no known history of diabetes mellitus and no recent hemoglobin A1c  · The etiology tingling in obese patient might be diabetes mellitus or vitamin deficiency  · Recommend her to follow up with her PCP for occasional tingling of the upper and lower extremity  Status post cholecystectomy  Assessment & Plan  · Patient had a history of choledocholithiasis and she underwent cholecystectomy in April 2021  She tolerated well the surgery  · Denies abdominal pain, nausea, vomiting, fever, chills  VTE Pharmacologic Prophylaxis: VTE Score: 4 Moderate Risk (Score 3-4) - Pharmacological DVT Prophylaxis Ordered: rivaroxaban (Xarelto)  Code Status: Level 1 - Full Code   Discussion with family: Patient declined call to   Anticipated Length of Stay: Patient will be admitted on an observation basis with an anticipated length of stay of less than 2 midnights secondary to Superficial thrombophlebitis  Chief Complaint:  Rapidly worsening erythema due to superficial thrombophlebitis on lower extremity    History of Present Illness:  Jannette Cortez is a 50 y o  female with no significant PMH who presents with rapidly worsening superficial thrombophlebitis of the lower extremities  She reported that she had a mechanical fall on 9/9 in her office and she fell on her left side without any visible injury at the time  She 1st noticed a lump with erythema on the upper and posterior part of left calf on 9/17    She described a gradual onset of dull aching pain on that area for 3 days  The pain did not radiate and range of motion was normal  Denied fever, chills, chest pain, shortness breath, abdominal pain, any neurological symptoms  She came into the ED on 9/18 and she had the venous duplex study for lower extremities which did not show any thrombus in the lower extremity veins, no DVT  The findings suggestive of acute occlusive superficial thrombophlebitis  She was managed with pain medication and was sent home with oral naproxen  On 9/19, she noticed that the erythema had spread on the distal left thigh with mild discomfort on the affected area  Denies calf pain, fever, chills, chest pain, shortness of breath  Her labworks were found to have within normal limits without leukocytosis  EKG showed normal sinus rhythm  She was under observation for worsening superficial thrombophlebitis of the lower extremities  Review of Systems:  Review of Systems   Constitutional: Negative for chills and fever  HENT: Negative for ear pain and sore throat  Eyes: Negative for pain and visual disturbance  Respiratory: Negative for cough and shortness of breath  Cardiovascular: Negative for chest pain, palpitations and leg swelling  Gastrointestinal: Negative for abdominal pain and vomiting  Genitourinary: Negative for dysuria and hematuria  Musculoskeletal: Negative for arthralgias and back pain  Skin: Negative for color change and rash  Spreading erythema on lower extremities due to superficial thrombophlebitis   Neurological: Negative for seizures and syncope  Tingling on the hand and foot   Hematological: Negative  Psychiatric/Behavioral: Negative  All other systems reviewed and are negative  Past Medical and Surgical History:   History reviewed  No pertinent past medical history      Past Surgical History:   Procedure Laterality Date    CHOLECYSTECTOMY LAPAROSCOPIC N/A 4/6/2021    Procedure: CHOLECYSTECTOMY LAPAROSCOPIC;  Surgeon: Manda Stratton DO;  Location: [facility];  Service: General    ECTOPIC PREGNANCY SURGERY         Meds/Allergies:  Prior to Admission medications    Medication Sig Start Date End Date Taking? Authorizing Provider   naproxen (NAPROSYN) 500 mg tablet Take 1 tablet (500 mg total) by mouth 2 (two) times a day for 15 doses 9/18/21 9/26/21 Yes Marialuisa Thomas MD     I have reviewed home medications with patient personally  Patient is not taking any medication at home  She sometimes use willow bark (Ease pain) for pain    Allergies: Allergies   Allergen Reactions    Amoxicillin Facial Swelling       Social History:  Marital Status: Single   Occupation:   Patient Pre-hospital Living Situation: Home  Patient Pre-hospital Level of Mobility: walks  Patient Pre-hospital Diet Restrictions: none  Substance Use History:   Social History     Substance and Sexual Activity   Alcohol Use Yes    Comment: socially     Social History     Tobacco Use   Smoking Status Never Smoker   Smokeless Tobacco Never Used     Social History     Substance and Sexual Activity   Drug Use Yes    Types: Marijuana       Family History:  History reviewed  No pertinent family history  Physical Exam:     Vitals:   Blood Pressure: 135/98 (09/19/21 2032)  Pulse: 81 (09/19/21 2032)  Temperature: 98 8 °F (37 1 °C) (09/19/21 2032)  Temp Source: Oral (09/19/21 2032)  Respirations: 18 (09/19/21 2032)  Height: 5' 4" (162 6 cm) (09/19/21 2032)  Weight - Scale: 120 kg (264 lb) (09/19/21 2032)  SpO2: 99 % (09/19/21 2032)    Physical Exam  Vitals and nursing note reviewed  Constitutional:       General: She is not in acute distress  Appearance: She is well-developed  She is obese  She is not toxic-appearing  HENT:      Head: Normocephalic and atraumatic        Mouth/Throat:      Mouth: Mucous membranes are moist    Eyes:      Conjunctiva/sclera: Conjunctivae normal    Cardiovascular:      Rate and Rhythm: Normal rate and regular rhythm  Heart sounds: Normal heart sounds  No murmur heard  Pulmonary:      Effort: Pulmonary effort is normal  No respiratory distress  Breath sounds: Normal breath sounds  Abdominal:      General: There is no distension  Palpations: Abdomen is soft  Tenderness: There is no abdominal tenderness  Musculoskeletal:      Cervical back: Neck supple  Skin:     General: Skin is warm and dry  Findings: Erythema present  No lesion  Neurological:      General: No focal deficit present  Mental Status: She is alert and oriented to person, place, and time  Additional Data:     Lab Results:  Results from last 7 days   Lab Units 09/20/21  0606   WBC Thousand/uL 6 78   HEMOGLOBIN g/dL 11 2*   HEMATOCRIT % 35 0   PLATELETS Thousands/uL 254   NEUTROS PCT % 50   LYMPHS PCT % 39   MONOS PCT % 10   EOS PCT % 1     Results from last 7 days   Lab Units 09/20/21  0606 09/19/21  2104   SODIUM mmol/L 138 140   POTASSIUM mmol/L 4 0 4 0   CHLORIDE mmol/L 107 104   CO2 mmol/L 25 24   BUN mg/dL 17 20   CREATININE mg/dL 0 76 0 81   ANION GAP mmol/L 6 12   CALCIUM mg/dL 8 2* 8 8   ALBUMIN g/dL  --  3 7   TOTAL BILIRUBIN mg/dL  --  0 35   ALK PHOS U/L  --  84   ALT U/L  --  22   AST U/L  --  12   GLUCOSE RANDOM mg/dL 88 106     Results from last 7 days   Lab Units 09/19/21  2104   INR  0 91                   Imaging: Personally reviewed the following imaging: Venous duplex study of lower extremities 9/18  Impression:  RIGHT LOWER LIMB LIMITED:  Evaluation shows no evidence of thrombus in the common femoral vein  Doppler evaluation shows a normal response to augmentation maneuvers  LEFT LOWER LIMB:  Findings suggest acute occlusive superficial thrombophlebitis of varicosities  in the upper posterior calf  No evidence of acute or chronic deep vein thrombosis  Doppler evaluation shows a normal response to augmentation maneuvers    Popliteal, posterior tibial and anterior tibial arterial Doppler waveforms are  triphasic  EKG and Other Studies Reviewed on Admission:   · EKG: NSR  HR 81 per minute  ** Please Note: This note has been constructed using a voice recognition system   **

## 2021-09-20 NOTE — ASSESSMENT & PLAN NOTE
· Patient complains of tingling on the hand and foot occasionally during the last 3 days without any other sensory and motor deficit  She has no known history of diabetes mellitus and no recent hemoglobin A1c  · The etiology tingling in obese patient might be diabetes mellitus or vitamin deficiency  · Recommend her to follow up with her PCP for occasional tingling of the upper and lower extremity

## 2021-09-20 NOTE — ED PROVIDER NOTES
History  Chief Complaint   Patient presents with    Leg Pain     Patient seen 9/18 for leg swelling/pain  C/o worsening pain, swelling, and erythema  Has been taking naproxen without relief  Marcial Mcneil is a 50 y o  female who presents with chief complaint of worsening left lower extremity pain and redness  She was seen yesterday in our ED and was diagnosed with superficial thrombophlebitis after an ultrasound  I saw her yesterday and her redness was limited to the proximal calf  Today it involves her distal thigh as well  She was prescribed nsaids and has been taking them  No fevers or chills  History provided by:  Patient and medical records   used: No    Leg Pain  Location:  Leg  Leg location:  L upper leg and L lower leg  Pain details:     Quality:  Aching and burning    Radiates to:  Does not radiate    Severity:  Moderate    Onset quality:  Gradual    Duration:  3 days    Timing:  Constant    Progression:  Worsening  Chronicity:  New  Relieved by:  Nothing  Worsened by:  Nothing  Ineffective treatments:  NSAIDs  Associated symptoms: swelling    Associated symptoms: no decreased ROM and no fever    Risk factors: obesity        Prior to Admission Medications   Prescriptions Last Dose Informant Patient Reported? Taking?   naproxen (NAPROSYN) 500 mg tablet   No No   Sig: Take 1 tablet (500 mg total) by mouth 2 (two) times a day for 15 doses      Facility-Administered Medications: None       History reviewed  No pertinent past medical history  Past Surgical History:   Procedure Laterality Date    CHOLECYSTECTOMY LAPAROSCOPIC N/A 4/6/2021    Procedure: CHOLECYSTECTOMY LAPAROSCOPIC;  Surgeon: Sudhakar Gallagher DO;  [facility];  Service: General    ECTOPIC PREGNANCY SURGERY         History reviewed  No pertinent family history  I have reviewed and agree with the history as documented      E-Cigarette/Vaping    E-Cigarette Use Current Some Day User     Comments CBD E-Cigarette/Vaping Substances     Social History     Tobacco Use    Smoking status: Never Smoker    Smokeless tobacco: Never Used   Vaping Use    Vaping Use: Some days   Substance Use Topics    Alcohol use: Yes     Comment: socially    Drug use: Yes     Types: Marijuana       Review of Systems   Constitutional: Negative for chills, diaphoresis and fever  Respiratory: Negative for shortness of breath  Cardiovascular: Positive for leg swelling  Negative for chest pain and palpitations  Gastrointestinal: Negative for diarrhea, nausea and vomiting  Genitourinary: Negative for dysuria and frequency  Skin: Positive for color change  Negative for rash  All other systems reviewed and are negative  Physical Exam  Physical Exam  Vitals and nursing note reviewed  Constitutional:       General: She is in acute distress  Appearance: She is well-developed  HENT:      Head: Normocephalic and atraumatic  Eyes:      Pupils: Pupils are equal, round, and reactive to light  Neck:      Vascular: No JVD  Cardiovascular:      Rate and Rhythm: Normal rate and regular rhythm  Heart sounds: Normal heart sounds  No murmur heard  No friction rub  No gallop  Pulmonary:      Effort: Pulmonary effort is normal  No respiratory distress  Breath sounds: Normal breath sounds  No wheezing or rales  Chest:      Chest wall: No tenderness  Musculoskeletal:         General: No tenderness  Normal range of motion  Cervical back: Normal range of motion  Skin:     General: Skin is warm and dry  Findings: Erythema present  Comments: Patient with superficial thrombophlebitis to her left lower extremity, involves proximal calf and distal thigh   Neurological:      General: No focal deficit present  Mental Status: She is alert and oriented to person, place, and time  Psychiatric:         Behavior: Behavior normal          Thought Content:  Thought content normal          Judgment: Judgment normal          Vital Signs  ED Triage Vitals   Temperature Pulse Respirations Blood Pressure SpO2   09/19/21 2032 09/19/21 2032 09/19/21 2032 09/19/21 2032 09/19/21 2032   98 8 °F (37 1 °C) 81 18 135/98 99 %      Temp Source Heart Rate Source Patient Position - Orthostatic VS BP Location FiO2 (%)   09/19/21 2032 09/19/21 2032 09/19/21 2032 -- --   Oral Monitor Sitting        Pain Score       09/19/21 2110       2           Vitals:    09/19/21 2032   BP: 135/98   Pulse: 81   Patient Position - Orthostatic VS: Sitting         Visual Acuity      ED Medications  Medications   enoxaparin (LOVENOX) subcutaneous injection 120 mg (has no administration in time range)   ketorolac (TORADOL) injection 15 mg (15 mg Intravenous Given 9/19/21 2110)       Diagnostic Studies  Results Reviewed     Procedure Component Value Units Date/Time    Comprehensive metabolic panel [[id number]] Collected: 09/19/21 2104    Lab Status: Final result Specimen: Blood from Arm, Left Updated: 09/19/21 2142     Sodium 140 mmol/L      Potassium 4 0 mmol/L      Chloride 104 mmol/L      CO2 24 mmol/L      ANION GAP 12 mmol/L      BUN 20 mg/dL      Creatinine 0 81 mg/dL      Glucose 106 mg/dL      Calcium 8 8 mg/dL      AST 12 U/L      ALT 22 U/L      Alkaline Phosphatase 84 U/L      Total Protein 7 6 g/dL      Albumin 3 7 g/dL      Total Bilirubin 0 35 mg/dL      eGFR 87 ml/min/1 73sq m     Narrative:      Charles guidelines for Chronic Kidney Disease (CKD):     Stage 1 with normal or high GFR (GFR > 90 mL/min/1 73 square meters)    Stage 2 Mild CKD (GFR = 60-89 mL/min/1 73 square meters)    Stage 3A Moderate CKD (GFR = 45-59 mL/min/1 73 square meters)    Stage 3B Moderate CKD (GFR = 30-44 mL/min/1 73 square meters)    Stage 4 Severe CKD (GFR = 15-29 mL/min/1 73 square meters)    Stage 5 End Stage CKD (GFR <15 mL/min/1 73 square meters)  Note: GFR calculation is accurate only with a steady state creatinine Protime-INR [[id number]]  (Normal) Collected: 09/19/21 2104    Lab Status: Final result Specimen: Blood from Arm, Left Updated: 09/19/21 2132     Protime 12 2 seconds      INR 0 91    APTT [[id number]]  (Normal) Collected: 09/19/21 2104    Lab Status: Final result Specimen: Blood from Arm, Left Updated: 09/19/21 2132     PTT 23 seconds     CBC and differential [[id number]] Collected: 09/19/21 2104    Lab Status: Final result Specimen: Blood from Arm, Left Updated: 09/19/21 2123     WBC 8 26 Thousand/uL      RBC 4 37 Million/uL      Hemoglobin 12 6 g/dL      Hematocrit 39 6 %      MCV 91 fL      MCH 28 8 pg      MCHC 31 8 g/dL      RDW 14 5 %      MPV 10 1 fL      Platelets 529 Thousands/uL      nRBC 0 /100 WBCs      Neutrophils Relative 72 %      Immat GRANS % 0 %      Lymphocytes Relative 20 %      Monocytes Relative 7 %      Eosinophils Relative 1 %      Basophils Relative 0 %      Neutrophils Absolute 5 93 Thousands/µL      Immature Grans Absolute 0 03 Thousand/uL      Lymphocytes Absolute 1 66 Thousands/µL      Monocytes Absolute 0 58 Thousand/µL      Eosinophils Absolute 0 04 Thousand/µL      Basophils Absolute 0 02 Thousands/µL                  No orders to display              Procedures  Procedures         ED Course                                           MDM  Number of Diagnoses or Management Options  Superficial thrombophlebitis: new and requires workup  Diagnosis management comments: Background: 50 y o  female presents with  Patient with worsening thrombophlebitis    Plan: admit for initiation of anticoagulation and observation due to rapidly progressing thrombophlebitis         Amount and/or Complexity of Data Reviewed  Clinical lab tests: ordered and reviewed    Risk of Complications, Morbidity, and/or Mortality  Presenting problems: high  Diagnostic procedures: high  Management options: high    Patient Progress  Patient progress: stable      Disposition  Final diagnoses:   Superficial thrombophlebitis - acute left leg     Time reflects when diagnosis was documented in both MDM as applicable and the Disposition within this note     Time User Action Codes Description Comment    9/19/2021 10:28 PM Glenora Mass B Add [I80 9] Superficial thrombophlebitis     9/19/2021 10:28 PM Glenora Mass B Modify [I80 9] Superficial thrombophlebitis acute left leg      ED Disposition     ED Disposition Condition Date/Time Comment    Admit Stable Sun Sep 19, 2021 10:28 PM Case was discussed with Dr Shannan Renner  and the patient's admission status was agreed to be Admission Status: observation status to the service of Dr Carlton Galeano   Follow-up Information    None         Patient's Medications   Discharge Prescriptions    No medications on file     No discharge procedures on file      PDMP Review       Value Time User    PDMP Reviewed  Yes 4/4/2021  1:12 AM Princess Evon MD          ED Provider  Electronically Signed by           Alex Randall MD  09/19/21 6952

## 2021-09-20 NOTE — DISCHARGE SUMMARY
[facility]  Discharge- Hewitt Flatness 1975, 50 y o  female MRN: [id number]  Unit/Bed#: ED 25 Encounter: [id number]  Primary Care Provider: No primary care provider on file  Date and time admitted to hospital: 9/19/2021  8:34 PM    * Superficial thrombophlebitis of lower leg  Assessment & Plan  · Patient's presentation is consistent with superficial thrombophlebitis ( see pictures in H&P)  · Patient may have thrombophlebitis present in her right lower extremity as well  ·  Given patient's severe varicosities and slow resolution of thrombophlebitis we will consider short course of anticoagulation  Plans:    · Start Xeralto 10 mg QD for 45 days   · Avoid NSAID  · Patient educated to use tylenol in the interm  · Warm compression and leg elevation  · Encourage patient to ambulate  · Monitor vitals    Status post cholecystectomy  Assessment & Plan  · Patient had a history of choledocholithiasis and she underwent cholecystectomy in April 2021  She tolerated well the surgery  · Denies abdominal pain, nausea, vomiting, fever, chills  Numbness and tingling of left upper and lower extremity  Assessment & Plan  · Patient complains of tingling on the hand and foot occasionally during the last 3 days without any other sensory and motor deficit  She has no known history of diabetes mellitus and no recent hemoglobin A1c  · The etiology tingling in obese patient might be diabetes mellitus or vitamin deficiency  · Recommend her to follow up with her PCP for occasional tingling of the upper and lower extremity  Medical Problems     Resolved Problems  Date Reviewed: 9/20/2021    None              Discharging Resident: Una Elise MD  Discharging Attending: No att  providers found  PCP: No primary care provider on file    Admission Date:   Admission Orders (From admission, onward)     Ordered        09/19/21 2229  Place in Observation  Once                   Discharge Date: 09/20/21    Consultations During Hospital Stay:  · No 2    Procedures Performed:   ·     Significant Findings / Test Results:   ·     Incidental Findings:   ·      Test Results Pending at Discharge (will require follow up):  ·      Outpatient Tests Requested:  ·     Complications:      Reason for Admission:  Superficial thrombophlebitis    Hospital Course:   Cullen Emanuel is a 50 y o  female patient who originally presented to the hospital on 9/19/2021 due to worsening pain and erythema in her left lower extremity  Patient had previously presented to the ED 09/18/2021 at which point she venous duplex lower extremity showing superficial thrombophlebitis present in her left lower extremity  Patient was discharged instructions to take NSAIDs  Patient return to the ED given she felt her erythema and swelling was starting to occur in the right extremity as well  Evaluation it was deemed patient has superficial thrombophlebitis which already was starting to resolve the morning after patient was started on naproxen  Given that patient palpable clot the superficial vein in her lower extremity patient was discharged Xarelto 10 milligrams q d  for 45 days and was instructed to take Tylenol as needed for pain  Patient was also instructed elevate her legs, prescription for compression stockings was given patient was instructed if her swelling was to worsen or to increase she should call her PCP or return to ED  patient was also instructed to establish PCP and follow up with  Please see above list of diagnoses and related plan for additional information  Condition at Discharge: good    Discharge Day Visit / Exam:   Subjective:  No acute overnight events  Patient has no pain In her lower extremities     Vitals: Blood Pressure: 134/74 (09/20/21 0920)  Pulse: 68 (09/20/21 0920)  Temperature: 98 8 °F (37 1 °C) (09/19/21 2032)  Temp Source: Oral (09/19/21 2032)  Respirations: 16 (09/20/21 0920)  Height: 5' 4" (162 6 cm) (09/19/21 2032)  Weight - Scale: 120 kg (264 lb) (09/19/21 2032)  SpO2: 97 % (09/20/21 0920)  Exam:   Physical Exam  Vitals and nursing note reviewed  Constitutional:       General: She is not in acute distress  Appearance: She is well-developed  HENT:      Head: Normocephalic and atraumatic  Mouth/Throat:      Mouth: Mucous membranes are moist       Pharynx: No oropharyngeal exudate  Eyes:      Extraocular Movements: Extraocular movements intact  Conjunctiva/sclera: Conjunctivae normal       Pupils: Pupils are equal, round, and reactive to light  Cardiovascular:      Rate and Rhythm: Normal rate and regular rhythm  Pulses: Normal pulses  Heart sounds: No murmur heard  Pulmonary:      Effort: Pulmonary effort is normal  No respiratory distress  Breath sounds: Normal breath sounds  Abdominal:      Palpations: Abdomen is soft  Tenderness: There is no abdominal tenderness  Musculoskeletal:         General: Tenderness present  Normal range of motion  Cervical back: Normal range of motion and neck supple  Right lower leg: No edema  Left lower leg: No edema  Skin:     General: Skin is warm and dry  Findings: Erythema present  Comments: Superficial thrombophlebitis present in left lower extremity  Clot can be felt  Patient has mild erythema with hardening of the vessel right lower extremity as well  Neurological:      General: No focal deficit present  Mental Status: She is alert and oriented to person, place, and time  Psychiatric:         Mood and Affect: Mood normal          Behavior: Behavior normal           Discussion with Family: Patient declined call to   Discharge instructions/Information to patient and family:   See after visit summary for information provided to patient and family        Provisions for Follow-Up Care:  See after visit summary for information related to follow-up care and any pertinent home health orders  Disposition:   Home    Planned Readmission: no    Discharge Medications:  See after visit summary for reconciled discharge medications provided to patient and/or family        **Please Note: This note may have been constructed using a voice recognition system**

## 2021-09-20 NOTE — DISCHARGE INSTR - AVS FIRST PAGE
Dear Wilmar Marcelino,     It was our pleasure to care for you here at [facility]  It is our hope that we were always able to exceed the expected standards for your care during your stay  You were hospitalized due to superficial thrombophlebitis  You were cared for on the ED floor by Gumaro Alex MD under the service of Kit Cates MD with the [facility] who covers for your primary care physician (PCP), No primary care provider on file  , while you were hospitalized  If you have any questions or concerns related to this hospitalization, you may contact us at [phone]  For follow up as well as any medication refills, we recommend that you follow up with your primary care physician  A registered nurse will reach out to you by phone within a few days after your discharge to answer any additional questions that you may have after going home  However, at this time we provide for you here, the most important instructions / recommendations at discharge:     · Notable Medication Adjustments -   · Please start taking Xarelto 10 mg once every morning with breakfast for 45 days  Please stop after 45 days  · For pain please take Tylenol 975 mg every 8 hours as needed  Please do not exceed more than 3 7 g in a 24 hour  · If your pain does not resolve with 975 mg of Tylenol, you can take 200 mg of Advil every 6 hours  Please do not exceed more than 400 mg of Advil, naproxen, ibuprofen and a 24 hour  NSAIDs have a severe reaction with Xarelto and can cause excessive bleeding  · Please use Voltaren gel on the area of your thrombophlebitis every 4 hours as needed  · Testing Required after Discharge -   · None  · Important follow up information -   · Please follow-up with the PCP within 1 week  · Other Instructions -   · If you notice your legs are worsening and a clots feel worse, please call your PCP or come back to the ED    · Please review this entire after visit summary as additional general instructions including medication list, appointments, activity, diet, any pertinent wound care, and other additional recommendations from your care team that may be provided for you        Sincerely,     Ava Gallego MD

## 2021-09-22 ENCOUNTER — TELEPHONE (OUTPATIENT)
Dept: INTERNAL MEDICINE CLINIC | Facility: CLINIC | Age: 46
End: 2021-09-22

## 2021-09-24 ENCOUNTER — OFFICE VISIT (OUTPATIENT)
Dept: INTERNAL MEDICINE CLINIC | Facility: CLINIC | Age: 46
End: 2021-09-24
Payer: COMMERCIAL

## 2021-09-24 VITALS
SYSTOLIC BLOOD PRESSURE: 128 MMHG | HEART RATE: 68 BPM | BODY MASS INDEX: 45.31 KG/M2 | TEMPERATURE: 99.3 F | HEIGHT: 64 IN | OXYGEN SATURATION: 100 % | DIASTOLIC BLOOD PRESSURE: 86 MMHG | WEIGHT: 265.4 LBS

## 2021-09-24 DIAGNOSIS — M79.10 MYALGIA: ICD-10-CM

## 2021-09-24 DIAGNOSIS — I80.00 SUPERFICIAL THROMBOPHLEBITIS OF LOWER LEG: Primary | ICD-10-CM

## 2021-09-24 DIAGNOSIS — Z12.31 ENCOUNTER FOR SCREENING MAMMOGRAM FOR MALIGNANT NEOPLASM OF BREAST: ICD-10-CM

## 2021-09-24 DIAGNOSIS — K21.00 GASTROESOPHAGEAL REFLUX DISEASE WITH ESOPHAGITIS WITHOUT HEMORRHAGE: ICD-10-CM

## 2021-09-24 DIAGNOSIS — E66.01 CLASS 3 SEVERE OBESITY WITHOUT SERIOUS COMORBIDITY WITH BODY MASS INDEX (BMI) OF 45.0 TO 49.9 IN ADULT, UNSPECIFIED OBESITY TYPE (HCC): ICD-10-CM

## 2021-09-24 PROCEDURE — 1036F TOBACCO NON-USER: CPT | Performed by: HOSPITALIST

## 2021-09-24 PROCEDURE — 3008F BODY MASS INDEX DOCD: CPT | Performed by: HOSPITALIST

## 2021-09-24 PROCEDURE — 3725F SCREEN DEPRESSION PERFORMED: CPT | Performed by: HOSPITALIST

## 2021-09-24 PROCEDURE — 99215 OFFICE O/P EST HI 40 MIN: CPT | Performed by: HOSPITALIST

## 2021-09-24 NOTE — PROGRESS NOTES
INTERNAL MEDICINE FOLLOW-UP OFFICE VISIT  [facility] - [facility] INTERNAL MEDICINE ROSS    NAME: Hansel Browne  AGE: 50 y o  SEX: female  : 1975     DATE: 2021     Assessment and Plan:     1  Superficial thrombophlebitis of lower leg  This is apparently improving  No evidence of any redness or hardening of the vessels noted as was seen during her admission  Patient has multiple varicose veins and chronic venous stasis  Continue Xarelto 10 mg daily for 45 days as previously directed  Doubt whether her tingling numbness is secondary to Xarelto  Continue were drawn gel but use it twice a day  There was no evidence of any DVT on the venous Doppler during her admission  MRI of the abdomen done in March/April of this year was essentially normal with no abnormality found in the abdomen  2  Gastroesophageal reflux disease with esophagitis without hemorrhage  Patient with complaints of epigastric pain and eructations suggestive of GERD  Patient states that she takes Pepcid intermittently  Recommended taking it daily for 2 weeks  3  Class 3 severe obesity without serious comorbidity with body mass index (BMI) of 45 0 to 49 9 in adult, unspecified obesity type (HCC)  Body mass index is 45 56 kg/m²  BMI Counseling: Body mass index is 45 56 kg/m²  Discussed the patient's BMI with her  The BMI is above average  BMI counseling and education was provided to the patient  Nutrition recommendations include reducing portion sizes, decreasing overall calorie intake and 3-5 servings of fruits/vegetables daily  Exercise recommendations include exercising 3-5 times per week  BMI Counseling: Body mass index is 45 56 kg/m²  The BMI is above normal  Nutrition recommendations include decreasing portion sizes, decreasing fast food intake, consuming healthier snacks and limiting drinks that contain sugar  Exercise recommendations include exercising 3-5 times per week   Rationale for BMI follow-up plan is due to patient being overweight or obese  Depression Screening and Follow-up Plan:   Patient was screened for depression during today's encounter  They screened negative with a PHQ-2 score of 1         4  Myalgia   she would benefit from 7602-1716 units of vitamin-D daily  Patient states that she has vitamin-D at home and will start taking it  Patient recommended a Pap smear as well as a mammogram   Will order a mammogram   Patient states that she will call the clinic and follow up with Dr Cinda Vera for a Pap smear  Chief Complaint:     Chief Complaint   Patient presents with   [address] [place] [address] f/u        History of Present Illness:     Bryce Blackwell is an extremely pleasant 68-year-old female who presents to establish care as well as a follow-up on her superficial thrombophlebitis for which she was recently admitted on 09/20/2021 at [facility]  Patient was initially seen in the ED and discharged with conservative therapy for superficial thrombophlebitis of the left lower extremity  However she presented back to the ED with worsening erythema and tender cord progressing to the distal aspect of the anterior thigh and posterior calf hence she was admitted for observation  Given that she had morbid obesity, bilateral varicose veins and slow resolution of thrombophlebitis it was decided to start her on Xarelto 10 mg daily for 45 days and the patient was subsequently discharged  Patient has been taking her Xarelto as well as diclofenac sodium ointment in addition to using compression stockings  Complains of some tingling numbness over the extremities as well as some chest tightness  Patient has had a cholecystectomy in the past and also complaining of epigastric pain and burping  Patient also admits to a stressful environment at work  Patient denies any worsening leg pain or any redness of fever      The following portions of the patient's history were reviewed and updated as appropriate: allergies, current medications, past family history, past medical history, past social history, past surgical history and problem list      Review of Systems:     Review of Systems complaining of weakness, tiredness, fatigability, mild epigastric pain, eructations, myalgias  All other review of symptoms negative unless specified otherwise     Problem List:     Patient Active Problem List   Diagnosis    Choledocholithiasis    Superficial thrombophlebitis of lower leg    Numbness and tingling of left upper and lower extremity    Status post cholecystectomy        Objective:     /86 (BP Location: Left arm, Patient Position: Sitting, Cuff Size: Standard)   Pulse 68   Temp 99 3 °F (37 4 °C) (Tympanic)   Ht 5' 4" (1 626 m)   Wt 120 kg (265 lb 6 4 oz)   SpO2 100%   BMI 45 56 kg/m²     Physical Exam   General Appearance:    Alert, cooperative, no distress, obese   Head:    Normocephalic, without obvious abnormality, atraumatic   Eyes:    PERRL, conjunctiva/corneas clear, EOM's intact, fundi     benign, both eyes        Neck:   Supple, no adenopathy, no JVD   Back:     Symmetric, no curvature, ROM normal, no CVA tenderness   Lungs:     Clear to auscultation bilaterally, no wheezing or rhonchi   Heart:    Regular rate and rhythm, S1 and S2 normal, no murmur, rub   or gallop   Abdomen:     Soft, non-tender, bowel sounds active    Extremities:   Bilateral lower extremity venostasis with multiple varicose veins  No evidence of any superficial thrombophlebitis  Psych:   Normal Affect   Neurologic:   CNII-XII intact  Normal strength, sensation and reflexes       Throughout       Pertinent Laboratory/Diagnostic Studies:    Laboratory Results: I have personally reviewed the pertinent laboratory results/reports         Radiology/Other Diagnostic Testing Results: I have personally reviewed pertinent films in PACS  Time spent more than 45 minutes    More than half the time spent counseling and coordinating care  Patient had a few questions which I answered to the best of my abilities    Yaquelin Garcia MD  [facility] INTERNAL MEDICINE [facility]

## 2021-11-03 ENCOUNTER — OFFICE VISIT (OUTPATIENT)
Dept: INTERNAL MEDICINE CLINIC | Facility: CLINIC | Age: 46
End: 2021-11-03
Payer: COMMERCIAL

## 2021-11-03 VITALS
HEIGHT: 64 IN | DIASTOLIC BLOOD PRESSURE: 78 MMHG | BODY MASS INDEX: 45.38 KG/M2 | TEMPERATURE: 98.4 F | SYSTOLIC BLOOD PRESSURE: 126 MMHG | WEIGHT: 265.8 LBS | OXYGEN SATURATION: 100 % | HEART RATE: 69 BPM

## 2021-11-03 DIAGNOSIS — I80.02 THROMBOPHLEBITIS OF SUPERFICIAL VEINS OF LEFT LOWER EXTREMITY: Primary | ICD-10-CM

## 2021-11-03 PROCEDURE — 1036F TOBACCO NON-USER: CPT | Performed by: HOSPITALIST

## 2021-11-03 PROCEDURE — 3008F BODY MASS INDEX DOCD: CPT | Performed by: HOSPITALIST

## 2021-11-03 PROCEDURE — 99214 OFFICE O/P EST MOD 30 MIN: CPT | Performed by: HOSPITALIST

## 2021-11-03 RX ORDER — VITAMIN B COMPLEX
1000 TABLET ORAL DAILY
COMMUNITY

## 2021-11-16 ENCOUNTER — TELEPHONE (OUTPATIENT)
Dept: INTERNAL MEDICINE CLINIC | Facility: CLINIC | Age: 46
End: 2021-11-16

## 2021-12-06 ENCOUNTER — OFFICE VISIT (OUTPATIENT)
Dept: VASCULAR SURGERY | Facility: CLINIC | Age: 46
End: 2021-12-06
Payer: COMMERCIAL

## 2021-12-06 VITALS
DIASTOLIC BLOOD PRESSURE: 76 MMHG | RESPIRATION RATE: 16 BRPM | SYSTOLIC BLOOD PRESSURE: 122 MMHG | BODY MASS INDEX: 45.24 KG/M2 | WEIGHT: 265 LBS | HEIGHT: 64 IN

## 2021-12-06 DIAGNOSIS — I80.00 SUPERFICIAL THROMBOPHLEBITIS OF LOWER LEG: ICD-10-CM

## 2021-12-06 DIAGNOSIS — I80.02 PHLEBITIS AND THROMBOPHLEBITIS OF SUPERFICIAL VESSELS OF LEFT LOWER EXTREMITY: Primary | ICD-10-CM

## 2021-12-06 PROCEDURE — 3008F BODY MASS INDEX DOCD: CPT | Performed by: SURGERY

## 2021-12-06 PROCEDURE — 99243 OFF/OP CNSLTJ NEW/EST LOW 30: CPT | Performed by: SURGERY

## 2021-12-06 PROCEDURE — 1036F TOBACCO NON-USER: CPT | Performed by: SURGERY

## 2021-12-15 ENCOUNTER — HOSPITAL ENCOUNTER (OUTPATIENT)
Dept: NON INVASIVE DIAGNOSTICS | Facility: CLINIC | Age: 46
Discharge: HOME | End: 2021-12-15
Payer: COMMERCIAL

## 2021-12-15 DIAGNOSIS — I80.02 PHLEBITIS AND THROMBOPHLEBITIS OF SUPERFICIAL VESSELS OF LEFT LOWER EXTREMITY: ICD-10-CM

## 2021-12-15 PROCEDURE — 93971 EXTREMITY STUDY: CPT

## 2021-12-19 PROCEDURE — 93971 EXTREMITY STUDY: CPT | Performed by: SURGERY

## 2022-01-06 ENCOUNTER — OFFICE VISIT (OUTPATIENT)
Dept: VASCULAR SURGERY | Facility: CLINIC | Age: 47
End: 2022-01-06
Payer: COMMERCIAL

## 2022-01-06 ENCOUNTER — TELEPHONE (OUTPATIENT)
Dept: VASCULAR SURGERY | Facility: CLINIC | Age: 47
End: 2022-01-06

## 2022-01-06 VITALS
DIASTOLIC BLOOD PRESSURE: 85 MMHG | HEIGHT: 64 IN | BODY MASS INDEX: 45.07 KG/M2 | SYSTOLIC BLOOD PRESSURE: 120 MMHG | TEMPERATURE: 98.1 F | HEART RATE: 78 BPM | WEIGHT: 264 LBS

## 2022-01-06 DIAGNOSIS — I80.00 SUPERFICIAL THROMBOPHLEBITIS OF LOWER LEG: ICD-10-CM

## 2022-01-06 DIAGNOSIS — I87.2 VENOUS INSUFFICIENCY OF LEFT LOWER EXTREMITY: Primary | ICD-10-CM

## 2022-01-06 PROCEDURE — 99213 OFFICE O/P EST LOW 20 MIN: CPT | Performed by: SURGERY

## 2022-01-06 PROCEDURE — 1036F TOBACCO NON-USER: CPT | Performed by: SURGERY

## 2022-01-06 PROCEDURE — 3008F BODY MASS INDEX DOCD: CPT | Performed by: SURGERY

## 2022-01-06 RX ORDER — CHLORHEXIDINE GLUCONATE 0.12 MG/ML
15 RINSE ORAL ONCE
Status: CANCELLED | OUTPATIENT
Start: 2022-01-06 | End: 2022-01-06

## 2022-01-06 RX ORDER — CLINDAMYCIN PHOSPHATE 900 MG/50ML
900 INJECTION INTRAVENOUS ONCE
Status: CANCELLED | OUTPATIENT
Start: 2022-01-06 | End: 2022-01-06

## 2022-01-06 NOTE — TELEPHONE ENCOUNTER
REMINDER: Under Reason For Call, comments MUST be formatted as:   (Surgeon's Initials) / (Procedure)    Physician / KYLE CORDOBA: Ashley Flowers // Sangeeta Ayers (NPI: [id number]) / [facility] (Tax: [id number] / NPI: [id number])    Procedure: ENDOVASCULAR LASER THERAPY (EVLT) LEFT ANTERIOR ACCESSORY SAPHENOUS VEIN, POSSIBLE LIGATION/STRIPPING, MULTIPLE STAB PHLEBECTOMIES    Level: 4 - Route clearance(s) to [facility] Surgery Coordinator Pool   Pt wants to wait until spring    Equipment / Rep Needs: Unknown    Assistant Surgeon: No    Allergies: Amoxicillin    Instructions Given: EVLT Packet with NO Bowel Prep General Instructions     Blood Thinners / Medication Hold: Patient is not taking any blood thinners  Hydration Required: Patient does not require hydration  Dialysis: Patient is not on dialysis  Consent: I certify that patient has signed, printed, timed, and dated their surgery consent  I certify that BOTH sides of the completed surgery consent have been scanned into the patient's Epic chart by myself on 1/6/2022  Yes, I have LABELED the consent in Epic as Consent for Vascular Procedure  Clearances     Levels   1-3 ROUTE this encounter to [facility] Clearance Pool   AND   SEND Clearance Form(s) to Vascular Nursing e-mail group   Level   4 ROUTE this encounter to [facility] Surgery Coordinator Pool  AND   SEND Clearance Form(s) to Vascular Surgery Schedulers e-mail group     Patient does not require any pre operative clearance  Yes, I have ROUTED this encounter to [facility] Surgery Coordinator and/or [facility] Clearance Pool

## 2022-01-06 NOTE — PROGRESS NOTES
Assessment/Plan:    Venous insufficiency of left lower extremity  Symptomatic left lower extremity venous insufficiency with history of small saphenous vein phlebitis  Wearing compression stockings, elevating legs and walking/exercising but still has aching along the thigh extending down to the lateral leg along a large cluster of varicosities  Venous reflux study demonstrates focal incompetence in the left GSV with incompetent anterior accessory GSV     -We discussed the pathophysiology of venous disease, available treatment options and indications for treatment  Venous reflux study demonstrates significant AASV reflux that become a cluster of varicosities    -Conservative measures help but do not fully control her symptoms  This includes the daily use of gradient compression socks, periodic leg elevation and regular exercise  -Recommend left AASV EVLT with possible ligation/stripping with multiple stab phlebectomies  All risks and benefits of the procedure including bleeding, infection, injury to surrounding structures, EHIT, nerve injury, skin burn, inflammation/phebitis, wound healing complications were discussed with the patient and informed consent was obtained  Diagnoses and all orders for this visit:    Venous insufficiency of left lower extremity  -     Case request operating room: ENDOVASCULAR LASER THERAPY (EVLT) LEFT ANTERIOR ACCESSORY SAPHENOUS VEIN, POSSIBLE LIGATION/STRIPPING, MULTIPLE STAB PHLEBECTOMIES; Standing  -     Basic metabolic panel; Future  -     CBC and Platelet;  Future  -     Case request operating room: ENDOVASCULAR LASER THERAPY (EVLT) LEFT ANTERIOR ACCESSORY SAPHENOUS VEIN, POSSIBLE LIGATION/STRIPPING, MULTIPLE STAB PHLEBECTOMIES    Superficial thrombophlebitis of lower leg        EVLT Operative Scheduling Information:    Hospital:  [facility]    Physician:  Cuca Poole    Surgery: Left anterior accessory saphenous vein endovascular laser therapy with possible ligation/stripping and multiple stab phlebectomies    Urgency:  Standard    Level:  Level 4: Outpatients to be scheduled for screening procedures and elective surgery that can be delayed for longer than one month without reasonable expectation of detriment to patient  Case Length:  Normal    Post-op Bed:  Outpatient    OR Table:  Standard    Equipment Needs:  Vascular technologist    Medication Instructions:  None    Hydration:  No    Venous Clinical Severity Scores (VCSS)  Item Absent   (0 points) Mild   (1 point) Moderate   (2 points) Severe   (3 points)   Pain [] None [] Occasional [x] Daily [] Daily limiting   Varicose veins [] None [] Few [] Calf or thigh [x] Calf and thigh   Venous edema [x] None [] Foot and ankle [] Above ankle, below knee [] To knee of above   Skin pigmentation [x] None [] Perimalleolar [] Diffuse, lower 1/3 calf [] Wider, above lower 1/3 calf   Inflammation [x] None [] Perimalleolar [] Diffuse, lower 1/3 calf [] Wider, above lower 1/3 calf   Induration [x] None [] Perimalleolar [] Diffuse, lower 1/3 calf [] Wider, above lower 1/3 calf   No  active ulcers [x] None [] 1 [] 2 [] ? 3   Active ulcer size [x] None [] <2 cm [] 2 - 6 cm [] >6 cm   Ulcer duration [x] None [] <3 months [] 3 - 12 months [] >1 year   Compression therapy [] None [] Intermittent [] Most days [x] Fully comply   Total 8          CEAP Clinical Classification  [x] Symptomatic   [] Asymptomatic     [] Class 0 No visible or palpable signs of venous disease   [] Class 1 Telangiectasies or reticular veins   [x] Class 2 Varicose veins; distinguished from reticular veins by a diameter of 3mm or more   [] Class 3 Edema   [] Class 4 Changes in skin and subcutaneous tissue secondary to CVD    [] Class 4a Pigmentation or eczema   [] Class 4b Lipodermatosclerosis or atrophie jen   [] Class 5 Healed venous ulcer   [] Class 6 Active venous ulcer       I have spent 25 minutes with Patient  today in which greater than 50% of this time was spent in counseling/coordination of care regarding Intructions for management, Importance of tx compliance and Impressions  Subjective:      Patient ID: Katia Nixon is a 48 y o  female  Pt is here to review LEVDR  Pt denies fever, pain and chills  Pt is a non smoker  HPI  Ms Luz Lux is a 46yo female with symptomatic left lower extremity varicosities and small saphenous vein phlebitis who presents for follow-up to review venous reflux study  She continues to wear compression stockings which does provide some relief but her legs still ache particularly towards the later part of the day  She has pain when exercising as well particularly in the thigh and lateral aspect of the knee along a long cluster of varicosities  She is interested in treatment due to her persistent symptoms  The following portions of the patient's history were reviewed and updated as appropriate: allergies, current medications, past family history, past medical history, past social history, past surgical history and problem list     Review of Systems   Constitutional: Negative  HENT: Negative  Eyes: Negative  Respiratory: Negative  Cardiovascular: Negative  Gastrointestinal: Negative  Endocrine: Negative  Genitourinary: Negative  Musculoskeletal: Negative  Skin: Negative  Allergic/Immunologic: Negative  Neurological: Negative  Hematological: Negative  Psychiatric/Behavioral: Negative  I have personally reviewed the ROS entered by MA and agree as documented  Objective:      /85 (BP Location: Left arm, Patient Position: Sitting, Cuff Size: Large)   Pulse 78   Temp 98 1 °F (36 7 °C) (Tympanic)   Ht 5' 4" (1 626 m)   Wt 120 kg (264 lb)   BMI 45 32 kg/m²          Physical Exam  Constitutional:       Appearance: Normal appearance  HENT:      Head: Normocephalic and atraumatic  Cardiovascular:      Rate and Rhythm: Normal rate        Pulses:           Popliteal pulses are 1+ on the left side  Dorsalis pedis pulses are 1+ on the left side  Pulmonary:      Effort: Pulmonary effort is normal    Abdominal:      Palpations: Abdomen is soft  Musculoskeletal:         General: Normal range of motion  Cervical back: Normal range of motion and neck supple  Skin:     General: Skin is warm and dry  Capillary Refill: Capillary refill takes less than 2 seconds  Comments: Bulging left anterolateral thigh and calf varicosities, scattered spider veins   Neurological:      General: No focal deficit present  Mental Status: She is alert and oriented to person, place, and time  Psychiatric:         Mood and Affect: Mood normal          Behavior: Behavior normal          Thought Content:  Thought content normal          Judgment: Judgment normal

## 2022-01-06 NOTE — PATIENT INSTRUCTIONS
Venous insufficiency of left lower extremity  Symptomatic left lower extremity venous insufficiency with history of small saphenous vein phlebitis  Wearing compression stockings, elevating legs and walking/exercising but still has aching along the thigh extending down to the lateral leg along a large cluster of varicosities       Venous reflux study demonstrates focal incompetence in the left GSV with incompetent anterior accessory GSV      -We discussed the pathophysiology of venous disease, available treatment options and indications for treatment  Venous reflux study demonstrates significant AASV reflux that become a cluster of varicosities    -Conservative measures help but do not fully control her symptoms  This includes the daily use of gradient compression socks, periodic leg elevation and regular exercise  -Recommend left AASV EVLT with possible ligation/stripping with multiple stab phlebectomies  All risks and benefits of the procedure including bleeding, infection, injury to surrounding structures, EHIT, nerve injury, skin burn, inflammation/phebitis, wound healing complications were discussed with the patient and informed consent was obtained

## 2022-01-06 NOTE — ASSESSMENT & PLAN NOTE
Symptomatic left lower extremity venous insufficiency with history of small saphenous vein phlebitis  Wearing compression stockings, elevating legs and walking/exercising but still has aching along the thigh extending down to the lateral leg along a large cluster of varicosities  Venous reflux study demonstrates focal incompetence in the left GSV with incompetent anterior accessory GSV     -We discussed the pathophysiology of venous disease, available treatment options and indications for treatment  Venous reflux study demonstrates significant AASV reflux that become a cluster of varicosities    -Conservative measures help but do not fully control her symptoms  This includes the daily use of gradient compression socks, periodic leg elevation and regular exercise  -Recommend left AASV EVLT with possible ligation/stripping with multiple stab phlebectomies  All risks and benefits of the procedure including bleeding, infection, injury to surrounding structures, EHIT, nerve injury, skin burn, inflammation/phebitis, wound healing complications were discussed with the patient and informed consent was obtained

## 2022-01-11 NOTE — TELEPHONE ENCOUNTER
S/w pt and advised I will be calling her to schedule her EVLT with Dr Mukesh Muro in the spring as she requested as soon as I have a date  I did explain the shortage of fibers for the machine we are experiencing currently and that this may delay scheduling  Pt is fine with this and will await my call to schedule

## 2022-01-19 ENCOUNTER — PREP FOR PROCEDURE (OUTPATIENT)
Dept: VASCULAR SURGERY | Facility: CLINIC | Age: 47
End: 2022-01-19

## 2022-01-19 DIAGNOSIS — I87.2 VENOUS INSUFFICIENCY OF LEFT LOWER EXTREMITY: Primary | ICD-10-CM

## 2022-01-19 NOTE — TELEPHONE ENCOUNTER
Verified patient's insurance   CONFIRMED - Patient's insurance is [employer]  Is patient requesting a call when authorization has been obtained? Patient did not request a call  Surgery Date: 4/15/22  Primary Surgeon: Mike Carvalho // Jaspal Espinoza (NPI: [id number])  Assisting Surgeon: Not Applicable (N/A)  Facility: [address] [place] [address] (Tax: [id number] / NPI: [id number])  Inpatient / Outpatient: Outpatient  Level: 4    Clearance Received: No clearance ordered  Consent Received: Yes, scanned into Epic on 1/6/22  Medication Hold / Last Dose: Not Applicable (N/A)  VQI Spreadsheet: Not Applicable (N/A)  IR Notified: Not Applicable (N/A)  Rep  Notified: Not Applicable (N/A)  Equipment Needs: Not Applicable (N/A)  Vas Lab Requested: 1/19/22  Patient Contacted: 1/19/22    Diagnosis: I87 2  Procedure/ CPT Code(s): (EVLT) Endovenous Laser Treatment WITH Stab Phlebectomies of the left upper leg // CPT: 35334, 47667     For varicose vein related procedures:   Last LEVDR: Yes, patient's Portia Albertina was completed within 12-months of their procedure date    CEAP Classification: 2, symptomatic   VCSS: 8    Post Operative Date/ Time: To Be Determined (TBD)     Pt will have blood work done at [address] [place] [address] 1-2 weeks prior

## 2022-02-07 ENCOUNTER — OFFICE VISIT (OUTPATIENT)
Dept: INTERNAL MEDICINE CLINIC | Facility: CLINIC | Age: 47
End: 2022-02-07
Payer: COMMERCIAL

## 2022-02-07 VITALS
HEIGHT: 64 IN | HEART RATE: 80 BPM | TEMPERATURE: 98.7 F | DIASTOLIC BLOOD PRESSURE: 78 MMHG | OXYGEN SATURATION: 99 % | BODY MASS INDEX: 45.41 KG/M2 | SYSTOLIC BLOOD PRESSURE: 120 MMHG | WEIGHT: 266 LBS

## 2022-02-07 DIAGNOSIS — E66.1 CLASS 3 DRUG-INDUCED OBESITY WITHOUT SERIOUS COMORBIDITY WITH BODY MASS INDEX (BMI) OF 45.0 TO 49.9 IN ADULT (HCC): ICD-10-CM

## 2022-02-07 DIAGNOSIS — R20.0 NUMBNESS AND TINGLING: ICD-10-CM

## 2022-02-07 DIAGNOSIS — R20.0 NUMBNESS AND TINGLING OF LEFT UPPER AND LOWER EXTREMITY: ICD-10-CM

## 2022-02-07 DIAGNOSIS — E66.01 CLASS 3 SEVERE OBESITY DUE TO EXCESS CALORIES WITH SERIOUS COMORBIDITY AND BODY MASS INDEX (BMI) OF 45.0 TO 49.9 IN ADULT (HCC): Primary | ICD-10-CM

## 2022-02-07 DIAGNOSIS — R20.2 NUMBNESS AND TINGLING OF LEFT UPPER AND LOWER EXTREMITY: ICD-10-CM

## 2022-02-07 DIAGNOSIS — R20.2 NUMBNESS AND TINGLING: ICD-10-CM

## 2022-02-07 DIAGNOSIS — I87.2 VENOUS INSUFFICIENCY OF LEFT LOWER EXTREMITY: ICD-10-CM

## 2022-02-07 PROBLEM — K21.9 GERD (GASTROESOPHAGEAL REFLUX DISEASE): Status: ACTIVE | Noted: 2022-02-07

## 2022-02-07 PROBLEM — E66.813 CLASS 3 SEVERE OBESITY WITH SERIOUS COMORBIDITY AND BODY MASS INDEX (BMI) OF 45.0 TO 49.9 IN ADULT (HCC): Status: ACTIVE | Noted: 2022-02-07

## 2022-02-07 PROCEDURE — 99213 OFFICE O/P EST LOW 20 MIN: CPT | Performed by: INTERNAL MEDICINE

## 2022-02-07 PROCEDURE — 1036F TOBACCO NON-USER: CPT | Performed by: INTERNAL MEDICINE

## 2022-02-07 PROCEDURE — 3008F BODY MASS INDEX DOCD: CPT | Performed by: INTERNAL MEDICINE

## 2022-02-07 NOTE — ASSESSMENT & PLAN NOTE
·  Patient complaining of numbness and tingling involving the hands and feet  ·  A1c 5 2      · Will check vitamin B12 and vitamin-D  As she is on a mostly vegetarian diet and lactose-free diet  · Monitor symptoms

## 2022-02-07 NOTE — ASSESSMENT & PLAN NOTE
· Left lower extremity duplex 12/2021: valvular incompetence noted greater saphenous vein  · Follows up with vascular surgery  Due for  left anterior accessory saphenous vein endovenous laser treatment with possible ligation/stripping

## 2022-02-07 NOTE — PROGRESS NOTES
INTERNAL MEDICINE FOLLOW-UP OFFICE VISIT  [facility]    NAME: Cara Boas  AGE: 48 y o  SEX: female  : 1975     DATE: 2022     Assessment and Plan:     1  Class 3 severe obesity due to excess calories with serious comorbidity and body mass index (BMI) of 45 0 to 49 9 in adult ([facility])  -     CBC and Platelet; Future; Expected date: 2022  -     Comprehensive metabolic panel; Future; Expected date: 2022  -     Lipid panel; Future; Expected date: 2022    2  Numbness and tingling of left upper and lower extremity  Assessment & Plan:  ·  Patient complaining of numbness and tingling involving the hands and feet  ·  A1c 5 2  · Will check vitamin B12 and vitamin-D  As she is on a mostly vegetarian diet and lactose-free diet  · Monitor symptoms      Orders:  -     Vitamin B12; Future  -     Vitamin D 25 hydroxy; Future    3  Numbness and tingling  Assessment & Plan:  ·  Patient complaining of numbness and tingling involving the hands and feet  ·  A1c 5 2  · Will check vitamin B12 and vitamin-D  As she is on a mostly vegetarian diet and lactose-free diet  · Monitor symptoms        4  Venous insufficiency of left lower extremity  Assessment & Plan:  · Left lower extremity duplex 2021: valvular incompetence noted greater saphenous vein  · Follows up with vascular surgery  Due for  left anterior accessory saphenous vein endovenous laser treatment with possible ligation/stripping        5  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 (BMI) of 45 0 to 49 9 in adult [facility])  Assessment & Plan:  · Counseled patient on increased physical activity and healthy eating        Return in about 6 months (around 2022) for Annual physical      Chief Complaint:     Chief Complaint   Patient presents with    Follow-up        History of Present Illness:       70-year-old female presents to the clinic today for 3 month follow-up visit  She was last seen in a clinic 11/03/2021 for superficial thrombophlebitis in the left lower extremity  She was evaluated by vascular surgery due to a persistent pain/ache in along the thigh extending down to the lateral leg along large cluster of varicosities with recommendations for left anterior accessory saphenous vein endovenous laser treatment with possible ligation/stripping  Today, she reports that she has been having some occasional chest discomfort then with burping reports she was trialed on Pepcid a while ago with improvement of symptoms however she has not been very compliant with the Pepcid intake it as needed she denies any associated nausea, abdominal pain, palpitation lightheadedness or shortness of breath  Reports only occasion NSAID use with her periods  She continues to pain extremity worsened with activity and prolonged standing  The following portions of the patient's history were reviewed and updated as appropriate: allergies, current medications, past family history, past medical history, past social history, past surgical history and problem list      Review of Systems:     Review of Systems   Constitutional: Negative for appetite change, chills, diaphoresis, fatigue and fever  Respiratory: Negative for cough and shortness of breath  Cardiovascular: Negative for chest pain, palpitations and leg swelling  Gastrointestinal: Negative for abdominal distention, abdominal pain, diarrhea, nausea and vomiting  Genitourinary: Negative for difficulty urinating and dysuria  Musculoskeletal: Negative for arthralgias and back pain  Neurological: Positive for numbness and headaches  Negative for dizziness, weakness and light-headedness          Problem List:     Patient Active Problem List   Diagnosis    Choledocholithiasis    Superficial thrombophlebitis of lower leg    Numbness and tingling    Status post cholecystectomy    Venous insufficiency of left lower extremity    Class 3 severe obesity without serious comorbidity with body mass index (BMI) of 45 0 to 49 9 in adult ([facility])    GERD (gastroesophageal reflux disease)        Objective:     /78 (BP Location: Left arm, Patient Position: Sitting, Cuff Size: Large)   Pulse 80   Temp 98 7 °F (37 1 °C)   Ht 5' 4" (1 626 m)   Wt 121 kg (266 lb)   SpO2 99%   BMI 45 66 kg/m²     Physical Exam  Vitals and nursing note reviewed  Constitutional:       General: She is not in acute distress  Appearance: She is well-developed  She is obese  She is not diaphoretic  HENT:      Head: Normocephalic and atraumatic  Eyes:      General: No scleral icterus  Conjunctiva/sclera: Conjunctivae normal    Cardiovascular:      Rate and Rhythm: Normal rate and regular rhythm  Heart sounds: Normal heart sounds  No murmur heard  Pulmonary:      Effort: Pulmonary effort is normal       Breath sounds: Normal breath sounds  No wheezing or rales  Abdominal:      General: Bowel sounds are normal  There is no distension  Palpations: Abdomen is soft  Tenderness: There is no abdominal tenderness  Skin:     General: Skin is warm and dry  Comments:  Varicose veins lower extremity   Neurological:      Mental Status: She is alert and oriented to person, place, and time     Psychiatric:         Mood and Affect: Mood normal          Speech: Speech normal          Behavior: Behavior normal          Pertinent Laboratory/Diagnostic Studies:    Laboratory Results: I have personally reviewed the pertinent laboratory results/reports     CBC:   Results from Last 12 Months   Lab Units 09/20/21  0606   WBC Thousand/uL 6 78   RBC Million/uL 3 84   HEMOGLOBIN g/dL 11 2*   HEMATOCRIT % 35 0   MCV fL 91   MCH pg 29 2   MCHC g/dL 32 0   RDW % 14 5   MPV fL 9 9   PLATELETS Thousands/uL 254   NRBC AUTO /100 WBCs 0   NEUTROS PCT % 50   LYMPHS PCT % 39   MONOS PCT % 10   EOS PCT % 1   BASOS PCT % 0   NEUTROS ABS Thousands/µL 3 36   LYMPHS ABS Thousands/µL 2 65   MONOS ABS Thousand/µL 0 68   EOS ABS Thousand/µL 0 05     Chemistry Profile:   Results from Last 12 Months   Lab Units 09/20/21  0606 09/19/21 2104 09/19/21 2104 09/18/21  1214 06/25/21  0724 04/06/21  0447 04/05/21  0559   POTASSIUM mmol/L 4 0   < > 4 0   < > 4 4   < > 3 9   CHLORIDE mmol/L 107   < > 104   < > 102   < > 109*   CO2 mmol/L 25   < > 24   < > 25   < > 22   BUN mg/dL 17   < > 20   < > 11   < > 4*   CREATININE mg/dL 0 76   < > 0 81   < > 0 68   < > 0 74   GLUCOSE FASTING mg/dL  --   --   --   --   --   --  105*   GLUCOSE RANDOM mg/dL 88   < > 106   < > 81   < > 105   CALCIUM mg/dL 8 2*   < > 8 8   < > 9 4   < > 7 8*   CORRECTED CALCIUM mg/dL  --   --   --   --  9 9   < > 8 8   AST U/L  --   --  12   < > 19   < > 119*   ALT U/L  --   --  22   < > 25   < > 174*   ALK PHOS U/L  --   --  84   < > 109   < > 123*   EGFR ml/min/1 73sq m 94   < > 87   < > 105   < > 97    < > = values in this interval not displayed  Radiology/Other Diagnostic Testing Results: I have personally reviewed pertinent reports        Hector Yao MD  [facility] INTERNAL MEDICINE [address] [place] [address]

## 2022-02-27 ENCOUNTER — HOSPITAL ENCOUNTER (EMERGENCY)
Facility: HOSPITAL | Age: 47
Discharge: HOME | End: 2022-02-27
Attending: EMERGENCY MEDICINE
Payer: COMMERCIAL

## 2022-02-27 ENCOUNTER — APPOINTMENT (EMERGENCY)
Dept: RADIOLOGY | Facility: HOSPITAL | Age: 47
End: 2022-02-27
Payer: COMMERCIAL

## 2022-02-27 VITALS
OXYGEN SATURATION: 99 % | TEMPERATURE: 98.7 F | RESPIRATION RATE: 16 BRPM | HEART RATE: 84 BPM | DIASTOLIC BLOOD PRESSURE: 72 MMHG | SYSTOLIC BLOOD PRESSURE: 159 MMHG

## 2022-02-27 DIAGNOSIS — R03.0 ELEVATED BLOOD PRESSURE READING: ICD-10-CM

## 2022-02-27 DIAGNOSIS — R07.9 CHEST PAIN, UNSPECIFIED TYPE: Primary | ICD-10-CM

## 2022-02-27 LAB
ALBUMIN SERPL BCP-MCNC: 3.4 G/DL (ref 3.5–5)
ALP SERPL-CCNC: 91 U/L (ref 46–116)
ALT SERPL W P-5'-P-CCNC: 19 U/L (ref 12–78)
ANION GAP SERPL CALCULATED.3IONS-SCNC: 12 MMOL/L (ref 4–13)
AST SERPL W P-5'-P-CCNC: 15 U/L (ref 5–45)
BASOPHILS # BLD AUTO: 0.01 THOUSANDS/ΜL (ref 0–0.1)
BASOPHILS NFR BLD AUTO: 0 % (ref 0–1)
BILIRUB SERPL-MCNC: 0.51 MG/DL (ref 0.2–1)
BUN SERPL-MCNC: 9 MG/DL (ref 5–25)
CALCIUM ALBUM COR SERPL-MCNC: 9.9 MG/DL (ref 8.3–10.1)
CALCIUM SERPL-MCNC: 9.4 MG/DL (ref 8.3–10.1)
CARDIAC TROPONIN I PNL SERPL HS: <2 NG/L (ref 8–18)
CHLORIDE SERPL-SCNC: 103 MMOL/L (ref 100–108)
CO2 SERPL-SCNC: 23 MMOL/L (ref 21–32)
CREAT SERPL-MCNC: 0.67 MG/DL (ref 0.6–1.3)
D DIMER PPP FEU-MCNC: 0.38 UG/ML FEU
EOSINOPHIL # BLD AUTO: 0.01 THOUSAND/ΜL (ref 0–0.61)
EOSINOPHIL NFR BLD AUTO: 0 % (ref 0–6)
ERYTHROCYTE [DISTWIDTH] IN BLOOD BY AUTOMATED COUNT: 14.5 % (ref 11.6–15.1)
GFR SERPL CREATININE-BSD FRML MDRD: 104 ML/MIN/1.73SQ M
GLUCOSE SERPL-MCNC: 93 MG/DL (ref 65–140)
HCT VFR BLD AUTO: 37.8 % (ref 34.8–46.1)
HGB BLD-MCNC: 12.5 G/DL (ref 11.5–15.4)
IMM GRANULOCYTES # BLD AUTO: 0.01 THOUSAND/UL (ref 0–0.2)
IMM GRANULOCYTES NFR BLD AUTO: 0 % (ref 0–2)
LIPASE SERPL-CCNC: 94 U/L (ref 73–393)
LYMPHOCYTES # BLD AUTO: 1.67 THOUSANDS/ΜL (ref 0.6–4.47)
LYMPHOCYTES NFR BLD AUTO: 23 % (ref 14–44)
MCH RBC QN AUTO: 28.8 PG (ref 26.8–34.3)
MCHC RBC AUTO-ENTMCNC: 33.1 G/DL (ref 31.4–37.4)
MCV RBC AUTO: 87 FL (ref 82–98)
MONOCYTES # BLD AUTO: 0.38 THOUSAND/ΜL (ref 0.17–1.22)
MONOCYTES NFR BLD AUTO: 5 % (ref 4–12)
NEUTROPHILS # BLD AUTO: 5.24 THOUSANDS/ΜL (ref 1.85–7.62)
NEUTS SEG NFR BLD AUTO: 72 % (ref 43–75)
NRBC BLD AUTO-RTO: 0 /100 WBCS
PLATELET # BLD AUTO: 328 THOUSANDS/UL (ref 149–390)
PMV BLD AUTO: 10.3 FL (ref 8.9–12.7)
POTASSIUM SERPL-SCNC: 3.9 MMOL/L (ref 3.5–5.3)
PROT SERPL-MCNC: 8 G/DL (ref 6.4–8.2)
RBC # BLD AUTO: 4.34 MILLION/UL (ref 3.81–5.12)
SODIUM SERPL-SCNC: 138 MMOL/L (ref 136–145)
WBC # BLD AUTO: 7.32 THOUSAND/UL (ref 4.31–10.16)

## 2022-02-27 PROCEDURE — 85025 COMPLETE CBC W/AUTO DIFF WBC: CPT | Performed by: EMERGENCY MEDICINE

## 2022-02-27 PROCEDURE — 36415 COLL VENOUS BLD VENIPUNCTURE: CPT | Performed by: EMERGENCY MEDICINE

## 2022-02-27 PROCEDURE — 99285 EMERGENCY DEPT VISIT HI MDM: CPT

## 2022-02-27 PROCEDURE — 71045 X-RAY EXAM CHEST 1 VIEW: CPT

## 2022-02-27 PROCEDURE — 93005 ELECTROCARDIOGRAM TRACING: CPT

## 2022-02-27 PROCEDURE — 83690 ASSAY OF LIPASE: CPT | Performed by: EMERGENCY MEDICINE

## 2022-02-27 PROCEDURE — 84484 ASSAY OF TROPONIN QUANT: CPT | Performed by: EMERGENCY MEDICINE

## 2022-02-27 PROCEDURE — 80053 COMPREHEN METABOLIC PANEL: CPT | Performed by: EMERGENCY MEDICINE

## 2022-02-27 PROCEDURE — 99285 EMERGENCY DEPT VISIT HI MDM: CPT | Performed by: EMERGENCY MEDICINE

## 2022-02-27 PROCEDURE — 85379 FIBRIN DEGRADATION QUANT: CPT | Performed by: EMERGENCY MEDICINE

## 2022-02-27 RX ORDER — ONDANSETRON 4 MG/1
8 TABLET, ORALLY DISINTEGRATING ORAL EVERY 8 HOURS PRN
Qty: 10 TABLET | Refills: 3 | Status: SHIPPED | OUTPATIENT
Start: 2022-02-27 | End: 2022-04-12 | Stop reason: ALTCHOICE

## 2022-02-27 RX ORDER — SIMETHICONE 80 MG
160 TABLET,CHEWABLE ORAL ONCE
Status: COMPLETED | OUTPATIENT
Start: 2022-02-27 | End: 2022-02-27

## 2022-02-27 RX ORDER — ONDANSETRON 4 MG/1
8 TABLET, ORALLY DISINTEGRATING ORAL ONCE
Status: COMPLETED | OUTPATIENT
Start: 2022-02-27 | End: 2022-02-27

## 2022-02-27 RX ADMIN — SIMETHICONE 160 MG: 80 TABLET, CHEWABLE ORAL at 18:15

## 2022-02-27 RX ADMIN — ONDANSETRON 8 MG: 4 TABLET, ORALLY DISINTEGRATING ORAL at 18:15

## 2022-02-27 NOTE — ED PROVIDER NOTES
History  Chief Complaint   Patient presents with    Chest Pain     Pt presents to the ED with intermittent chest pain  Exacerbated over the last 2 days, increased fatigue  48 yr female with several complaints--  since 5/21 has been having intermitent weekly sensation of fullness in ant/r/l chest- unrelated to activity / movements/ meals-- states in past relieved by burping--  For last 2 days these symptoms have been more constant- but not new --- no  Gerd/dyspepsia/progressiv e dsyphagia/ melena/brbpr-  Also with upper abd pain  Over last 2 days -- but again not new --- this upper abd pain maybe relATed to meals-- also more over last  WITH NOT REAL SOB- BUT SENSE OF NEED TO TAKE A BREATH -- no fevers/utri/cough --  [place] with intermitent tingling in arms/ legs  But again this is not new- no bue/ble weakness-       History provided by:  Patient   used: No    Chest Pain  Pain location:  Substernal area  Pain quality comment:  Fullness  Pain radiates to:  Does not radiate  Pain severity:  Mild  Onset quality:  Gradual  Duration:  2 days  Timing:  Constant  Chronicity:  Recurrent  Associated symptoms: abdominal pain, nausea, numbness and shortness of breath    Associated symptoms: no cough, no dizziness, no headache, no palpitations, not vomiting and no weakness        Prior to Admission Medications   Prescriptions Last Dose Informant Patient Reported? Taking?    cholecalciferol (VITAMIN D3) 25 mcg (1,000 units) tablet  Self Yes No   Sig: Take 1,000 Units by mouth daily   loratadine-pseudoephedrine (CLARITIN-D 12-HOUR) 5-120 mg per tablet  Self Yes No   Sig: Take 1 tablet by mouth daily as needed        Facility-Administered Medications: None       Past Medical History:   Diagnosis Date    Anxiety        Past Surgical History:   Procedure Laterality Date    CHOLECYSTECTOMY LAPAROSCOPIC N/A 4/6/2021    Procedure: CHOLECYSTECTOMY LAPAROSCOPIC;  Surgeon: Nelda Mitchell DO;  Location: [facility]; Service: General    ECTOPIC PREGNANCY SURGERY         Family History   Problem Relation Age of Onset    Diabetes Mother     Uterine cancer Mother     Diabetes Father     Coronary artery disease Father     Heart disease Father     Hearing loss Father      I have reviewed and agree with the history as documented  E-Cigarette/Vaping    E-Cigarette Use Current Some Day User     Comments CBD      E-Cigarette/Vaping Substances    CBD Yes      Social History     Tobacco Use    Smoking status: Never Smoker    Smokeless tobacco: Never Used   Vaping Use    Vaping Use: Some days    Substances: CBD   Substance Use Topics    Alcohol use: Yes     Comment: socially    Drug use: Yes     Types: Marijuana       Review of Systems   Constitutional: Negative  HENT: Negative  Eyes: Negative  Respiratory: Positive for shortness of breath  Negative for apnea, cough, choking, chest tightness, wheezing and stridor  Cardiovascular: Positive for chest pain  Negative for palpitations and leg swelling  Gastrointestinal: Positive for abdominal pain and nausea  Negative for abdominal distention, anal bleeding, blood in stool, constipation, diarrhea, rectal pain and vomiting  Endocrine: Negative  Genitourinary: Negative  Musculoskeletal: Negative  Skin: Negative  Allergic/Immunologic: Negative  Neurological: Positive for numbness  Negative for dizziness, tremors, seizures, syncope, facial asymmetry, speech difficulty, weakness, light-headedness and headaches  Hematological: Negative  Psychiatric/Behavioral: Negative  Physical Exam  Physical Exam  Vitals and nursing note reviewed  Constitutional:       General: She is not in acute distress  Appearance: She is well-developed  She is not ill-appearing, toxic-appearing or diaphoretic        Comments: avss- htnsive- mild tachy -- pulse ox 99 % on ra- interpretation is normal- no intervention- well appearing in nad    HENT:      Head: Normocephalic and atraumatic  Eyes:      Extraocular Movements: Extraocular movements intact  Pupils: Pupils are equal, round, and reactive to light  Comments: Mm pink   Neck:      Thyroid: No thyromegaly  Vascular: No hepatojugular reflux or JVD  Trachea: No tracheal deviation  Comments: No pmt c/t/l/s spine   Cardiovascular:      Rate and Rhythm: Regular rhythm  Tachycardia present  No extrasystoles are present  Chest Wall: PMI is not displaced  Pulses:           Radial pulses are 3+ on the right side and 3+ on the left side  Dorsalis pedis pulses are 3+ on the right side and 3+ on the left side  Heart sounds: Normal heart sounds  Heart sounds not distant  No murmur heard  No systolic murmur is present  No diastolic murmur is present  No friction rub  No gallop  No S3 or S4 sounds  Pulmonary:      Effort: Pulmonary effort is normal  No tachypnea, accessory muscle usage or respiratory distress  Breath sounds: No stridor  No decreased breath sounds, wheezing, rhonchi or rales  Chest:      Chest wall: No mass, deformity, tenderness, crepitus or edema  There is no dullness to percussion  Abdominal:      General: Bowel sounds are normal  There is no abdominal bruit  Palpations: Abdomen is soft  There is no fluid wave, hepatomegaly, splenomegaly or mass  Tenderness: There is no abdominal tenderness  There is no guarding or rebound  Comments: Soft nt/nd- no hsm-- no cva tenderness- no peritoneal signs- no ascites    Musculoskeletal:         General: Normal range of motion  Cervical back: Normal range of motion and neck supple  Right lower leg: No tenderness  No edema  Left lower leg: No tenderness  No edema  Comments: Equal bilateral radial/dp pulses- no ble edema/calf tendernss/asym/ erythema   Lymphadenopathy:      Cervical: No cervical adenopathy  Skin:     General: Skin is warm        Capillary Refill: Capillary refill takes less than 2 seconds  Coloration: Skin is not cyanotic or pale  Findings: No ecchymosis, erythema or rash  Nails: There is no clubbing  Neurological:      General: No focal deficit present  Mental Status: She is alert and oriented to person, place, and time  Cranial Nerves: No cranial nerve deficit  Motor: No weakness  Comments: Normal non focal neuro exam - normal bue/ble strength/sensation    Psychiatric:         Mood and Affect: Mood normal  Mood is not anxious  Behavior: Behavior normal  Behavior is not agitated           Vital Signs  ED Triage Vitals [02/27/22 1740]   Temperature Pulse Respirations Blood Pressure SpO2   98 7 °F (37 1 °C) 103 16 147/76 99 %      Temp Source Heart Rate Source Patient Position - Orthostatic VS BP Location FiO2 (%)   Oral Monitor Sitting Right arm --      Pain Score       --           Vitals:    02/27/22 1740 02/27/22 1830   BP: 147/76 159/72   Pulse: 103 84   Patient Position - Orthostatic VS: Sitting          Visual Acuity      ED Medications  Medications   ondansetron (ZOFRAN-ODT) dispersible tablet 8 mg (8 mg Oral Given 2/27/22 1815)   simethicone (MYLICON) chewable tablet 160 mg (160 mg Oral Given 2/27/22 1815)       Diagnostic Studies  Results Reviewed     Procedure Component Value Units Date/Time    CBC and differential [[id number]] Collected: 02/27/22 1827    Lab Status: Final result Specimen: Blood from Arm, Right Updated: 02/27/22 1839     WBC 7 32 Thousand/uL      RBC 4 34 Million/uL      Hemoglobin 12 5 g/dL      Hematocrit 37 8 %      MCV 87 fL      MCH 28 8 pg      MCHC 33 1 g/dL      RDW 14 5 %      MPV 10 3 fL      Platelets 663 Thousands/uL      nRBC 0 /100 WBCs      Neutrophils Relative 72 %      Immat GRANS % 0 %      Lymphocytes Relative 23 %      Monocytes Relative 5 %      Eosinophils Relative 0 %      Basophils Relative 0 %      Neutrophils Absolute 5 24 Thousands/µL      Immature Grans Absolute 0 01 Thousand/uL      Lymphocytes Absolute 1 67 Thousands/µL      Monocytes Absolute 0 38 Thousand/µL      Eosinophils Absolute 0 01 Thousand/µL      Basophils Absolute 0 01 Thousands/µL     Comprehensive metabolic panel [[id number]] Collected: 02/27/22 1827    Lab Status: In process Specimen: Blood from Arm, Right Updated: 02/27/22 1835    Lipase [[id number]] Collected: 02/27/22 1827    Lab Status: In process Specimen: Blood from Arm, Right Updated: 02/27/22 1835    D-dimer, quantitative [[id number]] Collected: 02/27/22 1827    Lab Status: In process Specimen: Blood from Arm, Right Updated: 02/27/22 1835    High Sensitivity Troponin I Random [[id number]] Collected: 02/27/22 1827    Lab Status: In process Specimen: Blood from Arm, Right Updated: 02/27/22 1835                 XR chest 1 view portable   ED Interpretation by Fam Ornelas MD (02/27 1843)   No change from previous                  Procedures  Procedures         ED Course  ED Course as of 02/27/22 2051   Sun Feb 27, 2022   1800 Er md  medical decision making note- pt is low risk for pe by wells score- score of 1 5-- er md clinical suspicion  is low- pt fails perc by  baseline tachy which resolved - will check d dimer and use peg ed study d dimer cutoff of 1000 for low risk pt's    1843 Cxr portable-  compared to previous 4/4/21-- no sign changes- no change in mediastinum/cardiac silhouette- no free/sq air - no infiltrate/ ptx/ pulm edema/pleural effusions   1921 Er md note-  pt re-evaluated  -  in nad-- currently looking at results on her phone-- reviewed all results  with pt-- discussed mildly elevated blood pressure in the er -  pt feels  is do to tight bp cuff causing her to feel tense- states checks her bp regularly at home and is always less than 140/90                               SBIRT 22yo+      Most Recent Value   SBIRT (23 yo +)    In order to provide better care to our patients, we are screening all of our patients for alcohol and drug use   Would it be okay to ask you these screening questions? Unable to answer at this time Filed at: 02/27/2022 1751                    MDM    Disposition  Final diagnoses:   None     ED Disposition     None      Follow-up Information    None         Patient's Medications   Discharge Prescriptions    No medications on file       No discharge procedures on file      PDMP Review       Value Time User    PDMP Reviewed  Yes 4/4/2021  1:12 AM Dyan Elaine MD          ED Provider  Electronically Signed by           Jade Dodd MD  02/27/22 2051

## 2022-02-27 NOTE — ED PROCEDURE NOTE
PROCEDURE  ECG 12 Lead Documentation Only    Date/Time: 2/27/2022 6:45 PM  Performed by: Angela Crandall MD  Authorized by: Angela Crandall MD     Indications / Diagnosis:  Cp  ECG reviewed by me, the ED Provider: yes    Patient location:  ED and bedside  Previous ECG:     Previous ECG:  Compared to current    Comparison ECG info:  9/18/21    Similarity:  No change    Comparison to cardiac monitor: Yes    Interpretation:     Interpretation: non-specific    Rate:     ECG rate:  78    ECG rate assessment: normal    Rhythm:     Rhythm: sinus rhythm    Ectopy:     Ectopy: none    QRS:     QRS axis:  Normal    QRS intervals:  Normal  Conduction:     Conduction: normal    ST segments:     ST segments:  Normal  T waves:     T waves: flattening      Flattening:  III and V1  Other findings:     Other findings: poor R wave progression and U wave    Comments:      Low voltage criteria- no ecg signs of ischemia/ injury / r heart strain / josefa/pericarditis          Angela Crandall MD  02/27/22 6184

## 2022-02-28 LAB
ATRIAL RATE: 78 BPM
ATRIAL RATE: 84 BPM
P AXIS: 40 DEGREES
P AXIS: 40 DEGREES
PR INTERVAL: 158 MS
PR INTERVAL: 160 MS
QRS AXIS: -14 DEGREES
QRS AXIS: -7 DEGREES
QRSD INTERVAL: 72 MS
QRSD INTERVAL: 72 MS
QT INTERVAL: 342 MS
QT INTERVAL: 352 MS
QTC INTERVAL: 401 MS
QTC INTERVAL: 404 MS
T WAVE AXIS: 27 DEGREES
T WAVE AXIS: 33 DEGREES
VENTRICULAR RATE: 78 BPM
VENTRICULAR RATE: 84 BPM

## 2022-02-28 PROCEDURE — 93010 ELECTROCARDIOGRAM REPORT: CPT | Performed by: INTERNAL MEDICINE

## 2022-02-28 NOTE — DISCHARGE INSTRUCTIONS
Diagnosis; chest pain / elevated blood pressure reading in the er -- 150/70--     - activity as tolerated     - based on our negative er chest pain workup - you are a low risk chest pain patient - approximately 1 out of 100 er chest pain patients like you will go on to have a heart attack in 1 months time    - please contact your primary  doctor tomorrow to schedule an appointment fro a recheck within 1 week       - the er is not the best place to check your blood pressure-  if you check your blood pressure at home  when you are calm/ relaxed and not in any pain - it should be persistently under 140/90 - if it is persistently above 140/90  you will need to call your doctor to schedule an appointment     - please return to  the er for any new/ worsening/concerning symptoms to you     - for any nausea- zofran 2 tablets dissolve in the mouth  every 6-8 hrs as needed

## 2022-03-16 NOTE — TELEPHONE ENCOUNTER
Authorization requirements reviewed  Please refer to Lotus Mohan / David Escalera number [id number] for case updates

## 2022-04-07 ENCOUNTER — OFFICE VISIT (OUTPATIENT)
Dept: INTERNAL MEDICINE CLINIC | Facility: CLINIC | Age: 47
End: 2022-04-07
Payer: COMMERCIAL

## 2022-04-07 VITALS
HEIGHT: 64 IN | HEART RATE: 71 BPM | WEIGHT: 262 LBS | DIASTOLIC BLOOD PRESSURE: 76 MMHG | OXYGEN SATURATION: 99 % | BODY MASS INDEX: 44.73 KG/M2 | TEMPERATURE: 98.4 F | SYSTOLIC BLOOD PRESSURE: 124 MMHG

## 2022-04-07 DIAGNOSIS — E66.01 CLASS 3 SEVERE OBESITY DUE TO EXCESS CALORIES WITHOUT SERIOUS COMORBIDITY WITH BODY MASS INDEX (BMI) OF 45.0 TO 49.9 IN ADULT (HCC): ICD-10-CM

## 2022-04-07 DIAGNOSIS — R03.0 ELEVATED BLOOD PRESSURE READING: Primary | ICD-10-CM

## 2022-04-07 DIAGNOSIS — F41.9 ANXIETY: ICD-10-CM

## 2022-04-07 DIAGNOSIS — L03.032 PARONYCHIA OF GREAT TOE OF LEFT FOOT: ICD-10-CM

## 2022-04-07 DIAGNOSIS — J35.8 TONSILLOLITH: ICD-10-CM

## 2022-04-07 DIAGNOSIS — R00.1 BRADYCARDIA: ICD-10-CM

## 2022-04-07 PROCEDURE — 3008F BODY MASS INDEX DOCD: CPT | Performed by: INTERNAL MEDICINE

## 2022-04-07 PROCEDURE — 99214 OFFICE O/P EST MOD 30 MIN: CPT | Performed by: INTERNAL MEDICINE

## 2022-04-07 RX ORDER — CEPHALEXIN 500 MG/1
500 CAPSULE ORAL EVERY 8 HOURS SCHEDULED
Qty: 21 CAPSULE | Refills: 0 | Status: SHIPPED | OUTPATIENT
Start: 2022-04-07 | End: 2022-04-14

## 2022-04-07 NOTE — PATIENT INSTRUCTIONS
[employer] - brand for BP cuff monitor    Psychology Today - www  psychologytoday  com    Take advil 400mg every 12h as needed for pain or inflammation  Please stop taking antibiotics if any side effects, and call the office    Check BP 2x a day in different times of the day and bring a log in 2 weeks

## 2022-04-07 NOTE — PROGRESS NOTES
Assessment/Plan:    Bradycardia  Noticed on her watch, during the daytime  No associated symptoms  EKG at rest was normal   Referred to cardiology for possible monitor to evaluated for possible arrhythmias  To keep a log of symptoms if any when she notice low HR  Anxiety  I have recommended psychotherapy and patient is amendable  Will start with that and discuss medication if no improvement  Follow up in 1 month  Class 3 severe obesity without serious comorbidity with body mass index (BMI) of 45 0 to 49 9 in [facility])  Encouraged health eating and exercise as tolerated  Elevated blood pressure reading  BP in the office is normal   I recommended to change BP machine and keep a log for the next 2 weeks, and call with results  Check 2x a day at different times and write down associated symptoms  Paronychia of great toe of left foot  Swelling, redness, onycholysis on left great toe  No obvious pus collection  NSAIDS PRN and start cephalexin x 7 days  Patient is not sure if she used this antibiotic before, instructed if any side effects to stop and call the office  Tonsillolith  On the left side only  Oriented oral hygiene  Avoid starchy food  Diagnoses and all orders for this visit:    Elevated blood pressure reading  -     Lipid Panel with Direct LDL reflex; Future  -     Cancel: Hemoglobin A1C; Future    Bradycardia  -     Ambulatory Referral to Cardiology; Future    Paronychia of great toe of left foot  -     cephalexin (KEFLEX) 500 mg capsule; Take 1 capsule (500 mg total) by mouth every 8 (eight) hours for 7 days    Class 3 severe obesity due to excess calories without serious comorbidity with body mass index (BMI) of 45 0 to 49 9 in [facility])  -     Lipid Panel with Direct LDL reflex;  Future  -     Cancel: Hemoglobin A1C; Future    Anxiety    Tonsillolith    Other orders  -     ascorbic acid (VITAMIN C) 250 MG CHEW; Chew 250 mg daily          Subjective:      Patient ID: Bryce Singleton is a 48 y o  female  Patient presents in the office today with multiple complaints  She was recently in the ER bc of reading of elevated BP at home (usually around 140/90), but has normal reading as well, and chest dyscomfot  ACS ws ruled out, all lab, imaging and EKG were unremarkable  She states she is under a lot of stress at home and at work, and believes her elevated BP could be related to that  She denies any palpitation, chest pain, diaphoresis  She is perimenopausal  She had 2 pregnances, 1 ectopic, 1 with positive pregnancy test but no viable product on US  She has a family hx of heart disease on her father side  She is a non-smoker  She was also concerned about reading of low BP on her apple watch  The BP go as low as 40 during awaking hours  She also noticed redness, swelling and mild pian over her left big toe that started 2 days ago  She denies any trauma, she denies doing pedicure recently  She has no fever or chills  The following portions of the patient's history were reviewed and updated as appropriate: allergies, current medications, past family history, past medical history, past social history, past surgical history and problem list     Review of Systems   Constitutional: Negative for appetite change and fatigue  HENT: Positive for ear pain  Negative for rhinorrhea and sore throat  Eyes: Negative for visual disturbance  Respiratory: Negative for cough, chest tightness, shortness of breath and wheezing  Cardiovascular: Positive for chest pain (chest pressure)  Negative for palpitations and leg swelling  Gastrointestinal: Negative for abdominal pain, nausea and vomiting  Genitourinary: Negative for difficulty urinating and frequency  Musculoskeletal: Positive for neck pain  Negative for arthralgias and joint swelling  Left great toe swelling and pain   Skin: Negative for rash  Neurological: Negative for dizziness and headaches  Psychiatric/Behavioral: Negative for confusion and sleep disturbance  The patient is nervous/anxious  Objective:      /76 (BP Location: Left arm, Patient Position: Sitting, Cuff Size: Standard)   Pulse 71   Temp 98 4 °F (36 9 °C) (Tympanic)   Ht 5' 4" (1 626 m)   Wt 119 kg (262 lb)   SpO2 99%   BMI 44 97 kg/m²          Physical Exam  Vitals and nursing note reviewed  Constitutional:       General: She is not in acute distress  Appearance: She is well-developed  She is obese  HENT:      Head: Normocephalic and atraumatic  Right Ear: Tympanic membrane normal       Left Ear: Tympanic membrane normal       Nose: Congestion present  Mouth/Throat:      Mouth: Mucous membranes are moist       Comments: Right tonsils with enlarged cavities, no pus   Eyes:      Conjunctiva/sclera: Conjunctivae normal       Pupils: Pupils are equal, round, and reactive to light  Neck:      Thyroid: No thyromegaly  Cardiovascular:      Rate and Rhythm: Normal rate and regular rhythm  Pulses: Normal pulses  Heart sounds: Normal heart sounds  No murmur heard  Pulmonary:      Effort: No respiratory distress  Breath sounds: Normal breath sounds  No wheezing  Abdominal:      General: Bowel sounds are normal  There is no distension  Palpations: Abdomen is soft  Tenderness: There is no abdominal tenderness  Musculoskeletal:         General: No swelling  Normal range of motion  Cervical back: Normal range of motion and neck supple  Skin:     General: Skin is warm and dry  Capillary Refill: Capillary refill takes less than 2 seconds  Findings: Erythema (Swelling, redness, onycholysis on left great toe) present  Neurological:      Mental Status: She is alert and oriented to person, place, and time  Sensory: No sensory deficit  Motor: No weakness or abnormal muscle tone  Psychiatric:         Thought Content:  Thought content normal  Judgment: Judgment normal

## 2022-04-08 ENCOUNTER — APPOINTMENT (OUTPATIENT)
Dept: LAB | Facility: CLINIC | Age: 47
End: 2022-04-08
Payer: COMMERCIAL

## 2022-04-08 ENCOUNTER — TELEPHONE (OUTPATIENT)
Dept: CARDIOLOGY CLINIC | Facility: CLINIC | Age: 47
End: 2022-04-08

## 2022-04-08 DIAGNOSIS — R03.0 ELEVATED BLOOD PRESSURE READING: ICD-10-CM

## 2022-04-08 DIAGNOSIS — E66.01 CLASS 3 SEVERE OBESITY DUE TO EXCESS CALORIES WITH SERIOUS COMORBIDITY AND BODY MASS INDEX (BMI) OF 45.0 TO 49.9 IN ADULT (HCC): ICD-10-CM

## 2022-04-08 DIAGNOSIS — E66.01 CLASS 3 SEVERE OBESITY DUE TO EXCESS CALORIES WITHOUT SERIOUS COMORBIDITY WITH BODY MASS INDEX (BMI) OF 45.0 TO 49.9 IN ADULT (HCC): ICD-10-CM

## 2022-04-08 DIAGNOSIS — R20.2 NUMBNESS AND TINGLING OF LEFT UPPER AND LOWER EXTREMITY: ICD-10-CM

## 2022-04-08 DIAGNOSIS — I87.2 VENOUS INSUFFICIENCY OF LEFT LOWER EXTREMITY: ICD-10-CM

## 2022-04-08 DIAGNOSIS — R20.0 NUMBNESS AND TINGLING OF LEFT UPPER AND LOWER EXTREMITY: ICD-10-CM

## 2022-04-08 PROBLEM — F41.9 ANXIETY: Status: ACTIVE | Noted: 2022-04-08

## 2022-04-08 PROBLEM — J35.8 TONSILLOLITH: Status: ACTIVE | Noted: 2022-04-08

## 2022-04-08 PROBLEM — R00.1 BRADYCARDIA: Status: ACTIVE | Noted: 2022-04-08

## 2022-04-08 PROBLEM — L03.032 PARONYCHIA OF GREAT TOE OF LEFT FOOT: Status: ACTIVE | Noted: 2022-04-08

## 2022-04-08 LAB
25(OH)D3 SERPL-MCNC: 40.7 NG/ML (ref 30–100)
ANION GAP SERPL CALCULATED.3IONS-SCNC: 4 MMOL/L (ref 4–13)
BUN SERPL-MCNC: 14 MG/DL (ref 5–25)
CALCIUM SERPL-MCNC: 9.8 MG/DL (ref 8.3–10.1)
CHLORIDE SERPL-SCNC: 104 MMOL/L (ref 100–108)
CHOLEST SERPL-MCNC: 207 MG/DL
CO2 SERPL-SCNC: 28 MMOL/L (ref 21–32)
CREAT SERPL-MCNC: 0.82 MG/DL (ref 0.6–1.3)
ERYTHROCYTE [DISTWIDTH] IN BLOOD BY AUTOMATED COUNT: 14.3 % (ref 11.6–15.1)
GFR SERPL CREATININE-BSD FRML MDRD: 84 ML/MIN/1.73SQ M
GLUCOSE P FAST SERPL-MCNC: 97 MG/DL (ref 65–99)
HCT VFR BLD AUTO: 42.3 % (ref 34.8–46.1)
HDLC SERPL-MCNC: 64 MG/DL
HGB BLD-MCNC: 13.5 G/DL (ref 11.5–15.4)
LDLC SERPL CALC-MCNC: 131 MG/DL (ref 0–100)
MCH RBC QN AUTO: 28.5 PG (ref 26.8–34.3)
MCHC RBC AUTO-ENTMCNC: 31.9 G/DL (ref 31.4–37.4)
MCV RBC AUTO: 89 FL (ref 82–98)
PLATELET # BLD AUTO: 349 THOUSANDS/UL (ref 149–390)
PMV BLD AUTO: 10.5 FL (ref 8.9–12.7)
POTASSIUM SERPL-SCNC: 4.7 MMOL/L (ref 3.5–5.3)
RBC # BLD AUTO: 4.73 MILLION/UL (ref 3.81–5.12)
SODIUM SERPL-SCNC: 136 MMOL/L (ref 136–145)
TRIGL SERPL-MCNC: 62 MG/DL
VIT B12 SERPL-MCNC: 420 PG/ML (ref 100–900)
WBC # BLD AUTO: 4.98 THOUSAND/UL (ref 4.31–10.16)

## 2022-04-08 PROCEDURE — 85027 COMPLETE CBC AUTOMATED: CPT

## 2022-04-08 PROCEDURE — 82607 VITAMIN B-12: CPT

## 2022-04-08 PROCEDURE — 80048 BASIC METABOLIC PNL TOTAL CA: CPT

## 2022-04-08 PROCEDURE — 80061 LIPID PANEL: CPT

## 2022-04-08 PROCEDURE — 36415 COLL VENOUS BLD VENIPUNCTURE: CPT

## 2022-04-08 PROCEDURE — 82306 VITAMIN D 25 HYDROXY: CPT

## 2022-04-08 NOTE — ASSESSMENT & PLAN NOTE
Swelling, redness, onycholysis on left great toe  No obvious pus collection  NSAIDS PRN and start cephalexin x 7 days  Patient is not sure if she used this antibiotic before, instructed if any side effects to stop and call the office

## 2022-04-08 NOTE — ASSESSMENT & PLAN NOTE
BP in the office is normal   I recommended to change BP machine and keep a log for the next 2 weeks, and call with results  Check 2x a day at different times and write down associated symptoms

## 2022-04-08 NOTE — ASSESSMENT & PLAN NOTE
Noticed on her watch, during the daytime  No associated symptoms  EKG at rest was normal   Referred to cardiology for possible monitor to evaluated for possible arrhythmias  To keep a log of symptoms if any when she notice low HR

## 2022-04-08 NOTE — TELEPHONE ENCOUNTER
Attempted to call Rohith Quevedo in regards to her referral to cardiology to see Dr Roy Officer as a new patient consult  L/m to call back to make the cardiology appointment

## 2022-04-08 NOTE — ASSESSMENT & PLAN NOTE
I have recommended psychotherapy and patient is amendable  Will start with that and discuss medication if no improvement  Follow up in 1 month

## 2022-04-11 ENCOUNTER — TELEPHONE (OUTPATIENT)
Dept: VASCULAR SURGERY | Facility: CLINIC | Age: 47
End: 2022-04-11

## 2022-04-11 NOTE — TELEPHONE ENCOUNTER
Pt called to notify Dr Kobe Abarca she has infection in her L great toe  She is scheduled for L EVLT w/ stabs, possible stripping/ligation 4/15/22  She saw pcp 4/7/22 (please see their ov note for additional info) and was placed on antibx  She will complete last does Thursday evening  Advised I would route his information to Dr Kobe Abarca to confirm it is ok to proceed w/ surgery as planned 4/15/22

## 2022-04-12 ENCOUNTER — OFFICE VISIT (OUTPATIENT)
Dept: INTERNAL MEDICINE CLINIC | Facility: CLINIC | Age: 47
End: 2022-04-12
Payer: COMMERCIAL

## 2022-04-12 VITALS
DIASTOLIC BLOOD PRESSURE: 90 MMHG | TEMPERATURE: 99.2 F | SYSTOLIC BLOOD PRESSURE: 138 MMHG | BODY MASS INDEX: 44.39 KG/M2 | WEIGHT: 258.6 LBS | OXYGEN SATURATION: 99 % | HEART RATE: 67 BPM

## 2022-04-12 DIAGNOSIS — F41.9 ANXIETY: ICD-10-CM

## 2022-04-12 DIAGNOSIS — E66.1 CLASS 3 DRUG-INDUCED OBESITY WITHOUT SERIOUS COMORBIDITY WITH BODY MASS INDEX (BMI) OF 45.0 TO 49.9 IN ADULT (HCC): ICD-10-CM

## 2022-04-12 DIAGNOSIS — L03.032 PARONYCHIA OF GREAT TOE OF LEFT FOOT: Primary | ICD-10-CM

## 2022-04-12 PROCEDURE — 1036F TOBACCO NON-USER: CPT | Performed by: INTERNAL MEDICINE

## 2022-04-12 PROCEDURE — 99212 OFFICE O/P EST SF 10 MIN: CPT | Performed by: INTERNAL MEDICINE

## 2022-04-12 NOTE — PROGRESS NOTES
INTERNAL MEDICINE FOLLOW-UP OFFICE VISIT  [facility] INTERNAL MEDICINE [place]    NAME: Kelly Hollingsworth  AGE: 48 y o  SEX: female    DATE OF ENCOUNTER: 4/12/2022   Assessment and Plan:     1  Paronychia of great toe of left foot  · Appears to have been an ongoing issue since August 2021, with on and off swelling around the nail bed  · Stated that her nail has not grown since August  · Worst since last week with pain, redness around the great toe, and occasional pus discharge  · Today is day 5 of po cephalexin, advised to continue 10 days total as she is taking 500 mg q12h  · Pain and erythema has improved significantly since she saw us last Thursday  · Given the chronic recurrent nature, will refer to podiatry    - Ambulatory Referral to Podiatry; Future    2  Anxiety  · Has multiple ongoing personal stressors, and has anxiety  · Does not take any medications, and would currently like to wait to see if things would resolve once her stresses improve  · She does have a higher blood pressure reading in the office visit, however states that her blood pressure usually ranges /70-80 at home BP monitor      3  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 (BMI) of 45 0 to 49 9 in adult (HCC)  BMI Counseling: Body mass index is 44 39 kg/m²  The BMI is above normal  Nutrition recommendations include reducing portion sizes, decreasing overall calorie intake, 3-5 servings of fruits/vegetables daily, reducing fast food intake, consuming healthier snacks, decreasing soda and/or juice intake and moderation in carbohydrate intake  Exercise recommendations include moderate aerobic physical activity for 150 minutes/week  Return if symptoms worsen or fail to improve       Counseling:     · Medication Side Effects - Adverse side effects of medications were reviewed with the patient/guardian today: Yes  · Counseling was given regarding: Prognosis, Risks and benefits of tx options, Intructions for management, Patient and family education and Importance of tx compliance  · Barriers to treatment include: No identified barriers  Nutrition Assessment and Intervention:     Reviewed food recall journal    New Nutrition Prescription completed with patient      Other interventions: Patient declined the nutrition referral     Physical Activity Assessment and Intervention:    Activity journal reviewed    Physical Activity Prescription completed with patient      Emotional and Mental Well-being, Sleep, Connectedness Assessment and Intervention:    Sleep/stress assessment performed    Depression and anxiety screening performed and reviewed    Counseled regarding sleep hygiene and aspects of healthy sleep      Tobacco and Toxic Substance Assessment and Intervention:     Tobacco use screening performed    Alcohol and drug use screening performed          Chief Complaint:     Chief Complaint   Patient presents with    Follow-up     toe drainage        History of Present Illness:     I had the pleasure of seeing Otilia Sinclair who is here for left great toe pain and swelling  She was seen in our office last week and was prescribed po cephalexin  Since then she states that she has had a significant improvement in swelling and pain, she has also been applying lavender, apple cider vinegar, on it  She had noted a pus discharge recently, and was advised to follow up with us in the clinic  No fevers or chills  See above for more info    The following portions of the patient's history were reviewed and updated as appropriate: allergies, current medications, past family history, past medical history, past social history, past surgical history and problem list      Review of Systems:     Review of Systems   Constitutional: Negative  Negative for activity change, chills, fatigue, fever and unexpected weight change  HENT: Negative  Eyes: Negative    Negative for photophobia and visual disturbance  Respiratory: Negative  Negative for cough, shortness of breath and wheezing  Cardiovascular: Negative  Negative for palpitations and leg swelling  Gastrointestinal: Negative  Negative for abdominal pain, constipation, diarrhea, nausea and vomiting  Genitourinary: Negative  Negative for dysuria, hematuria and urgency  Musculoskeletal: Negative  Negative for arthralgias, back pain and myalgias  Neurological: Negative  Negative for seizures, facial asymmetry and headaches  Hematological: Negative  Psychiatric/Behavioral: Negative  Problem List:     Patient Active Problem List   Diagnosis    Choledocholithiasis    Superficial thrombophlebitis of lower leg    Numbness and tingling    Status post cholecystectomy    Venous insufficiency of left lower extremity    Class 3 severe obesity without serious comorbidity with body mass index (BMI) of 45 0 to 49 9 in adult ([facility]    GERD (gastroesophageal reflux disease)    Anxiety    Bradycardia    Paronychia of great toe of left foot    Elevated blood pressure reading    Tonsillolith    Lymphadenopathy        Objective:     /90 (BP Location: Left arm, Patient Position: Sitting, Cuff Size: Large)   Pulse 67   Temp 99 2 °F (37 3 °C)   Wt 117 kg (258 lb 9 6 oz)   SpO2 99%   BMI 44 39 kg/m²     Physical Exam  Vitals and nursing note reviewed  Constitutional:       General: She is not in acute distress  Appearance: She is not ill-appearing  Comments: BMI44   HENT:      Head: Normocephalic  Nose: Nose normal       Mouth/Throat:      Mouth: Mucous membranes are moist    Eyes:      Pupils: Pupils are equal, round, and reactive to light  Cardiovascular:      Rate and Rhythm: Normal rate and regular rhythm  Pulses: Normal pulses  Dorsalis pedis pulses are 2+ on the right side and 2+ on the left side  Posterior tibial pulses are 2+ on the right side and 2+ on the left side        Heart sounds: Normal heart sounds  No murmur heard  Pulmonary:      Effort: Pulmonary effort is normal       Breath sounds: Normal breath sounds  No wheezing or rales  Abdominal:      General: Bowel sounds are normal       Palpations: Abdomen is soft  Tenderness: There is no abdominal tenderness  There is no guarding  Musculoskeletal:      Cervical back: Normal range of motion  Right lower leg: No edema  Left lower leg: No edema  Feet:    Feet:      Right foot:      Skin integrity: Skin integrity normal       Left foot:      Skin integrity: Skin integrity normal    Skin:     General: Skin is warm  Capillary Refill: Capillary refill takes less than 2 seconds  Neurological:      General: No focal deficit present  Mental Status: She is alert and oriented to person, place, and time  Pertinent Laboratory/Diagnostic Studies:    Laboratory Results: I have personally reviewed the pertinent laboratory results/reports   Radiology/Other Diagnostic Testing Results: I have personally reviewed pertinent reports  Current Medications:     Current Outpatient Medications   Medication Sig Dispense Refill    ascorbic acid (VITAMIN C) 250 MG CHEW Chew 250 mg daily      cephalexin (KEFLEX) 500 mg capsule Take 1 capsule (500 mg total) by mouth every 8 (eight) hours for 7 days 21 capsule 0    cholecalciferol (VITAMIN D3) 25 mcg (1,000 units) tablet Take 1,000 Units by mouth daily      loratadine-pseudoephedrine (CLARITIN-D 12-HOUR) 5-120 mg per tablet Take 1 tablet by mouth daily as needed         No current facility-administered medications for this visit         Ángelmark Young MD  [facility] INTERNAL MEDICINE [address] [facility]

## 2022-04-12 NOTE — TELEPHONE ENCOUNTER
S/w pt and offered her 5/27 or 6/17 to reschedule to  Pt said she is not sure if those dates work and feels like she may want to wait longer  She said she will call back when she is ready to schedule

## 2022-04-12 NOTE — TELEPHONE ENCOUNTER
April 11, 2022             12:14 PM  Galen Babinski contacted DANYEL Satck RN         12:20 PM  Note  Pt called to notify Dr Angelica Carrasco she has infection in her L great toe  She is scheduled for L EVLT w/ stabs, possible stripping/ligation 4/15/22  She saw pcp 4/7/22 (please see their ov note for additional info) and was placed on antibx  She will complete last does Thursday evening        Advised I would route his information to Dr Angelica Carrasco to confirm it is ok to proceed w/ surgery as planned 4/15/22                     12:20 PM  David Justice RN routed this conversation to Ronna Tristan MD  [facility]     April 12, 2022    Ronna Tristan MD  to David Justice RN        9:00 AM  Note  I am unclear what the extent of infection is in her great toe? I would prefer to delay than risk having an infection so close to timing of surgery  Evita Michelle RN  to Ronna Tristan MD  [facility] Coordinator  [facility]        9:05 AM  Note  Pt just called back this morning and stated she noticed a moderate amount of pus draining from the toe nail last night  She said there was a small amount this morning  Informed her I would let Dr Angelica Carrasco know this as well and advised her to also make her PCP aware      Will route to surgery scheduling to delay surgery

## 2022-04-12 NOTE — TELEPHONE ENCOUNTER
I am unclear what the extent of infection is in her great toe? I would prefer to delay than risk having an infection so close to timing of surgery

## 2022-04-12 NOTE — TELEPHONE ENCOUNTER
Pt just called back this morning and stated she noticed a moderate amount of pus draining from the toe nail last night  She said there was a small amount this morning  Informed her I would let Dr Kobe Abarca know this as well and advised her to also make her PCP aware  Will route to surgery scheduling to delay surgery

## 2022-04-18 ENCOUNTER — TELEPHONE (OUTPATIENT)
Dept: INTERNAL MEDICINE CLINIC | Facility: CLINIC | Age: 47
End: 2022-04-18

## 2022-04-18 NOTE — TELEPHONE ENCOUNTER
Pt finished medication, still has puss is coming out, and red, she is still soaking the foot, her appt with podiatrist on Friday, she is done with the antibotic , does she need to take more antibotic?

## 2022-04-18 NOTE — TELEPHONE ENCOUNTER
This patient had a same day appointment with podiatry on 4/13 which was cancelled? after seeing us in the office on 4/12  I do not see an appt with podiatry until 5/10 unless she is seeing someone outside of the [facility] network  I would hold off on further systemic antibiotics if she is not having fevers and remains systemically well, however I would like her to have a closer follow-up appt with podiatry if possible

## 2022-05-02 ENCOUNTER — RA CDI HCC (OUTPATIENT)
Dept: OTHER | Facility: HOSPITAL | Age: 47
End: 2022-05-02

## 2022-05-02 NOTE — PROGRESS NOTES
[facility]  coding opportunities       Chart reviewed, no opportunity found: CHART REVIEWED, NO OPPORTUNITY FOUND        Patients Insurance        Commercial Insurance: [address] [place] [address]

## 2022-05-10 ENCOUNTER — TELEPHONE (OUTPATIENT)
Dept: INTERNAL MEDICINE CLINIC | Facility: CLINIC | Age: 47
End: 2022-05-10

## 2022-05-10 NOTE — TELEPHONE ENCOUNTER
I called patient to r/s and she has many up coming appts, and she wants to wait one month and she will call back

## 2022-06-05 ENCOUNTER — NURSE TRIAGE (OUTPATIENT)
Dept: OTHER | Facility: OTHER | Age: 47
End: 2022-06-05

## 2022-06-05 NOTE — TELEPHONE ENCOUNTER
Patient calling in with mild covid symptoms testing positive for Covid this morning  Home care advice given per protocol and patient instructed to call back with any worsening symptoms after trying home care  Patient verbalized understanding and has no further questions at this time  Reason for Disposition   [1] COVID-19 diagnosed by positive lab test (e g , PCR, rapid self-test kit) AND [2] mild symptoms (e g , cough, fever, others) AND [2] no complications or SOB    Answer Assessment - Initial Assessment Questions  Were you within 6 feet or less, for up to 15 minutes or more with a person that has a confirmed COVID-19 test? Was around people Sunday and Monday   What was the date of your exposure? Last Sunday and Monday     Are you experiencing any symptoms attributed to the virus?  (Assess for SOB, cough, fever, difficulty breathing) temperature 99 5, runny nose, scratchy throat, mild cough-no difficulty breathing     HIGH RISK: Do you have any history heart or lung conditions, weakened immune system, diabetes, Asthma, CHF, HIV, COPD, Chemo, renal failure, sickle cell, etc? No     VACCINE: "Have you gotten the COVID-19 vaccine?" If Yes ask: "Which one, how many shots, when did you get it?" fully vaccinated and boosted- pfizer and moderna    Tested positive for Covid today, was negative on home test Friday and Saturday      Protocols used: CORONAVIRUS (COVID-19) DIAGNOSED OR SUSPECTED-ADULT-

## 2022-06-05 NOTE — TELEPHONE ENCOUNTER
Regarding: COVID POS MEDICAL ADVISE  ----- Message from Natalia Friend sent at 6/5/2022 11:35 AM EDT -----  She began not feeling well on Friday - she got worse yesterday  She has been progressively worse has a fever today   Sore throat, nose runny, chest congestion   COVID test positive today wants to know what she should to do

## 2022-06-06 ENCOUNTER — TELEMEDICINE (OUTPATIENT)
Dept: INTERNAL MEDICINE CLINIC | Facility: CLINIC | Age: 47
End: 2022-06-06
Payer: COMMERCIAL

## 2022-06-06 DIAGNOSIS — U07.1 COVID-19: Primary | ICD-10-CM

## 2022-06-06 PROCEDURE — 99213 OFFICE O/P EST LOW 20 MIN: CPT | Performed by: INTERNAL MEDICINE

## 2022-06-06 NOTE — ASSESSMENT & PLAN NOTE
· Symptom onset: 06/03/2022  · Positive test: 06/04/2022  · Patient is fully vaccinated and received booster as well  · Plan:  · Start Paxlovid 1 tab BID for 5 days  Risk and benefits were discussed  Patient agrees with this treatment  · Continue Mucinex for cough  · Flonase for nasal congestion/rinorrhea  · Tylenol for headache and fever  · Patient was advised to contact the office if symptoms worsen or fail to improve

## 2022-06-06 NOTE — PROGRESS NOTES
COVID-19 Outpatient Progress Note    Assessment/Plan:    Problem List Items Addressed This Visit        Other    COVID-19 - Primary     · Symptom onset: 06/03/2022  · Positive test: 06/04/2022  · Patient is fully vaccinated and received booster as well  · Plan:  · Start Paxlovid 1 tab BID for 5 days  Risk and benefits were discussed  Patient agrees with this treatment  · Continue Mucinex for cough  · Flonase for nasal congestion/rinorrhea  · Tylenol for headache and fever  · Patient was advised to contact the office if symptoms worsen or fail to improve  Relevant Medications    nirmatrelvir & ritonavir (Paxlovid) tablet therapy pack         Disposition:     Discussed symptom directed medication options with patient  Discussed vitamin D, vitamin C, and/or zinc supplementation with patient  Patient is vaccinated and received booster in December  I advised to self isolate for 5 days and return to work with a KN95 or N95 for 5 more days  Patient meets criteria for PAXLOVID and they have been counseled appropriately according to EUA documentation released by the FDA  After discussion, patient agrees to treatment  Denzel Diehl is an investigational medicine used to treat mild-to-moderate COVID-19 in adults and children (15years of age and older weighing at least 80 pounds (40 kg)) with positive results of direct SARS-CoV-2 viral testing, and who are at high risk for progression to severe COVID-19, including hospitalization or death  PAXLOVID is investigational because it is still being studied  There is limited information about the safety and effectiveness of using PAXLOVID to treat people with mild-to-moderate COVID-19      The FDA has authorized the emergency use of PAXLOVID for the treatment of mild-tomoderate COVID-19 in adults and children (15years of age and older weighing at least 80 pounds (40 kg)) with a positive test for the virus that causes COVID-19, and who are at high risk for progression to severe COVID-19, including hospitalization or death, under an EUA  What should I tell my healthcare provider before I take PAXLOVID? Tell your healthcare provider if you:  - Have any allergies  - Have liver or kidney disease  - Are pregnant or plan to become pregnant  - Are breastfeeding a child  - Have any serious illnesses    Tell your healthcare provider about all the medicines you take, including prescription and over-the-counter medicines, vitamins, and herbal supplements  Some medicines may interact with PAXLOVID and may cause serious side effects  Keep a list of your medicines to show your healthcare provider and pharmacist when you get a new medicine  You can ask your healthcare provider or pharmacist for a list of medicines that interact with PAXLOVID  Do not start taking a new medicine without telling your healthcare provider  Your healthcare provider can tell you if it is safe to take PAXLOVID with other medicines  Tell your healthcare provider if you are taking combined hormonal contraceptive  PAXLOVID may affect how your birth control pills work  Females who are able to become pregnant should use another effective alternative form of contraception or an additional barrier method of contraception  Talk to your healthcare provider if you have any questions about contraceptive methods that might be right for you  How do I take PAXLOVID? PAXLOVID consists of 2 medicines: nirmatrelvir and ritonavir  - Take 2 pink tablets of nirmatrelvir with 1 white tablet of ritonavir by mouth 2 times each day (in the morning and in the evening) for 5 days  For each dose, take all 3 tablets at the same time  - If you have kidney disease, talk to your healthcare provider  You may need a different dose  - Swallow the tablets whole  Do not chew, break, or crush the tablets  - Take PAXLOVID with or without food    - Do not stop taking PAXLOVID without talking to your healthcare provider, even if you feel better  - If you miss a dose of PAXLOVID within 8 hours of the time it is usually taken, take it as soon as you remember  If you miss a dose by more than 8 hours, skip the missed dose and take the next dose at your regular time  Do not take 2 doses of PAXLOVID at the same time  - If you take too much PAXLOVID, call your healthcare provider or go to the nearest hospital emergency room right away  - If you are taking a ritonavir- or cobicistat-containing medicine to treat hepatitis C or Human Immunodeficiency Virus (HIV), you should continue to take your medicine as prescribed by your healthcare provider   - Talk to your healthcare provider if you do not feel better or if you feel worse after 5 days  Who should generally not take PAXLOVID? Do not take PAXLOVID if:  You are allergic to nirmatrelvir, ritonavir, or any of the ingredients in PAXLOVID  You are taking any of the following medicines:  - Alfuzosin  - Pethidine, piroxicam, propoxyphene  - Ranolazine  - Amiodarone, dronedarone, flecainide, propafenone, quinidine  - Colchicine  - Lurasidone, pimozide, clozapine  - Dihydroergotamine, ergotamine, methylergonovine  - Lovastatin, simvastatin  - Sildenafil (Revatio®) for pulmonary arterial hypertension (PAH)  - Triazolam, oral midazolam  - Apalutamide  - Carbamazepine, phenobarbital, phenytoin  - Rifampin  - St  Anatolys Wort (hypericum perforatum)    What are the important possible side effects of PAXLOVID? Possible side effects of PAXLOVID are:  - Liver Problems  Tell your healthcare provider right away if you have any of these signs and symptoms of liver problems: loss of appetite, yellowing of your skin and the whites of eyes (jaundice), dark-colored urine, pale colored stools and itchy skin, stomach area (abdominal) pain  - Resistance to HIV Medicines  If you have untreated HIV infection, PAXLOVID may lead to some HIV medicines not working as well in the future    - Other possible side effects include: altered sense of taste, diarrhea, high blood pressure, or muscle aches    These are not all the possible side effects of PAXLOVID  Not many people have taken PAXLOVID  Serious and unexpected side effects may happen  Trudy Soto is still being studied, so it is possible that all of the risks are not known at this time  What other treatment choices are there? Like Kyung Spence may allow for the emergency use of other medicines to treat people with COVID-19  Go to https://[employer]/ for information on the emergency use of other medicines that are authorized by FDA to treat people with COVID-19  Your healthcare provider may talk with you about clinical trials for which you may be eligible  It is your choice to be treated or not to be treated with PAXLOVID  Should you decide not to receive it or for your child not to receive it, it will not change your standard medical care  What if I am pregnant or breastfeeding? There is no experience treating pregnant women or breastfeeding mothers with PAXLOVID  For a mother and unborn baby, the benefit of taking PAXLOVID may be greater than the risk from the treatment  If you are pregnant, discuss your options and specific situation with your healthcare provider  It is recommended that you use effective barrier contraception or do not have sexual activity while taking PAXLOVID  If you are breastfeeding, discuss your options and specific situation with your healthcare provider  How do I report side effects with PAXLOVID? Contact your healthcare provider if you have any side effects that bother you or do not go away  Report side effects to FDA MedWatch at www fda gov/medwatch or call 1-093-TDC8534 or you can report side effects to [facility]  at the contact information provided below      Website Fax number Telephone number www [employer] [phone]     How should I store Gemma Shown? Store PAXLOVID tablets at room temperature between 68°F to 77°F (20°C to 25°C)  Full fact sheet for patients, parents, and caregivers can be found at: [employer]    I have spent 15 minutes directly with the patient  Greater than 50% of this time was spent in counseling/coordination of care regarding: diagnostic results, risks and benefits of treatment options, instructions for management and risk factor reductions  Encounter provider Argenis Sandy MD    Provider located at [facility] [address] [place] [address]  [phone]    Recent Visits  No visits were found meeting these conditions  Showing recent visits within past 7 days and meeting all other requirements  Today's Visits  Date Type Provider Dept   06/06/22 Miladys Small MD [facility] [place]   Showing today's visits and meeting all other requirements  Future Appointments  No visits were found meeting these conditions  Showing future appointments within next 150 days and meeting all other requirements     This virtual check-in was done via [address] and patient was informed that this is a secure, HIPAA-compliant platform  She agrees to proceed  Patient agrees to participate in a virtual check in via telephone or video visit instead of presenting to the office to address urgent/immediate medical needs  Patient is aware this is a billable service  After connecting through [facility], the patient was identified by name and date of birth  Maria Fernanda Langford was informed that this was a telemedicine visit and that the exam was being conducted confidentially over secure lines  My office door was closed  No one else was in the room  Maria Fernanda Langford acknowledged consent and understanding of privacy and security of the telemedicine visit   I informed the patient that I have reviewed her record in Epic and presented the opportunity for her to ask any questions regarding the visit today  The patient agreed to participate  Verification of patient location:  Patient is located in the following state in which I hold an active license: [place]    Subjective:   Jonathan Gilmore is a 48 y o  female who is concerned about COVID-19  Patient's symptoms include fever, chills, fatigue, nasal congestion, rhinorrhea, sore throat, cough, myalgias and headache  Patient denies shortness of breath       - Date of symptom onset: 6/3/2022      COVID-19 vaccination status: Fully vaccinated with booster    Exposure:   Contact with a person who is under investigation (PUI) for or who is positive for COVID-19 within the last 14 days?: Yes    Hospitalized recently for fever and/or lower respiratory symptoms?: No      Currently a healthcare worker that is involved in direct patient care?: No      Works in a special setting where the risk of COVID-19 transmission may be high? (this may include long-term care, correctional and long-term facilities; homeless shelters; assisted-living facilities and group homes ): No      Resident in a special setting where the risk of COVID-19 transmission may be high? (this may include long-term care, correctional and long-term facilities; homeless shelters; assisted-living facilities and group homes ): No      Lab Results   Component Value Date    SARSCOV2 Negative 04/04/2021     Past Medical History:   Diagnosis Date    Anxiety      Past Surgical History:   Procedure Laterality Date    CHOLECYSTECTOMY LAPAROSCOPIC N/A 4/6/2021    Procedure: CHOLECYSTECTOMY LAPAROSCOPIC;  Surgeon: Merry Roman DO;  [facility];  Service: General    ECTOPIC PREGNANCY SURGERY       Current Outpatient Medications   Medication Sig Dispense Refill    nirmatrelvir & ritonavir (Paxlovid) tablet therapy pack Take 3 tablets by mouth 2 (two) times a day for 5 days Take 2 nirmatrelvir tablets + 1 ritonavir tablet together per dose 30 tablet 0    ascorbic acid (VITAMIN C) 250 MG CHEW Chew 250 mg daily      cholecalciferol (VITAMIN D3) 25 mcg (1,000 units) tablet Take 1,000 Units by mouth daily      loratadine-pseudoephedrine (CLARITIN-D 12-HOUR) 5-120 mg per tablet Take 1 tablet by mouth daily as needed         No current facility-administered medications for this visit  Allergies   Allergen Reactions    Amoxicillin Facial Swelling       Review of Systems   Constitutional: Positive for chills, fatigue and fever  HENT: Positive for congestion, rhinorrhea and sore throat  Respiratory: Positive for cough  Negative for shortness of breath  Musculoskeletal: Positive for myalgias  Neurological: Positive for headaches  Objective: There were no vitals filed for this visit  Physical Exam  Constitutional:       General: She is not in acute distress  Appearance: She is not toxic-appearing or diaphoretic  Pulmonary:      Comments: Patient is able to speak in full sentences  No evidence of respiratory distress on video call  Neurological:      Mental Status: She is alert and oriented to person, place, and time  Psychiatric:         Mood and Affect: Mood normal          VIRTUAL VISIT DISCLAIMER    Aloma Post verbally agrees to participate in [employer]  Pt is aware that [employer] could be limited without vital signs or the ability to perform a full hands-on physical exam  Felicita Morfin understands she or the provider may request at any time to terminate the video visit and request the patient to seek care or treatment in person

## 2022-06-21 ENCOUNTER — PROCEDURE VISIT (OUTPATIENT)
Dept: CARDIOLOGY CLINIC | Facility: CLINIC | Age: 47
End: 2022-06-21
Payer: COMMERCIAL

## 2022-06-21 ENCOUNTER — CONSULT (OUTPATIENT)
Dept: CARDIOLOGY CLINIC | Facility: CLINIC | Age: 47
End: 2022-06-21
Payer: COMMERCIAL

## 2022-06-21 VITALS
HEART RATE: 67 BPM | DIASTOLIC BLOOD PRESSURE: 64 MMHG | WEIGHT: 258 LBS | BODY MASS INDEX: 44.05 KG/M2 | SYSTOLIC BLOOD PRESSURE: 106 MMHG | HEIGHT: 64 IN

## 2022-06-21 DIAGNOSIS — R00.1 BRADYCARDIA: Primary | ICD-10-CM

## 2022-06-21 DIAGNOSIS — R07.9 CHEST PAIN, UNSPECIFIED TYPE: Primary | ICD-10-CM

## 2022-06-21 DIAGNOSIS — R00.1 BRADYCARDIA: ICD-10-CM

## 2022-06-21 PROCEDURE — 99401 PREV MED CNSL INDIV APPRX 15: CPT | Performed by: INTERNAL MEDICINE

## 2022-06-21 PROCEDURE — 93000 ELECTROCARDIOGRAM COMPLETE: CPT | Performed by: INTERNAL MEDICINE

## 2022-06-21 PROCEDURE — RECHECK: Performed by: INTERNAL MEDICINE

## 2022-06-21 PROCEDURE — 1036F TOBACCO NON-USER: CPT | Performed by: INTERNAL MEDICINE

## 2022-06-21 PROCEDURE — 99244 OFF/OP CNSLTJ NEW/EST MOD 40: CPT | Performed by: INTERNAL MEDICINE

## 2022-06-21 PROCEDURE — 3008F BODY MASS INDEX DOCD: CPT | Performed by: INTERNAL MEDICINE

## 2022-06-21 RX ORDER — FAMOTIDINE 20 MG/1
TABLET, FILM COATED ORAL
COMMUNITY
Start: 2022-04-22

## 2022-06-21 RX ORDER — ASCORBIC ACID 100 MG
TABLET,CHEWABLE ORAL
COMMUNITY
Start: 2022-04-22

## 2022-06-21 NOTE — PROGRESS NOTES
[address] [facility]iology  Office Consultation  Judi Waggoner 48 y o  female MRN: [id number]        Chief Complaint    Chief Complaint   Patient presents with    Chest Pain     New patient consult      Slow Heart Rate     Per her apple watch 2-3 times a time pulse drops to 40         Referring Provider: Sabrina Cueto MD    Impression & Plan:    1  Bradycardia  Either benign sinus bradycardia or misread of her Apple watch  We will check a Holter  - Ambulatory Referral to Cardiology  - POCT ECG  - Holter monitor; Future    2  Chest pain, unspecified type  Likely GI in etiology  No concerning cardiac factors  No further ischemic workup   - POCT ECG        We will see Judi Waggoner back as needed  HPI: Judi Waggoner is a 48y o  year old female complaining of abnormal heart rates on her Apple Watch  She has noticed on her heart rate trend that her HR will occasionally dip into the 40s bpm  She is unsure if there are corresponding symptoms to that particular heart rate  She does occasionally get a chest tightness or racing sensation, often exacerbated by eating salty food  This has been going on for years  She first noticed it with her first Apple Watch a few years ago  Has not been changing dramatically  We spent an extended period today discussing the patient's weight, diet and exercise as it pertains to cardiac health  This discussion occurred over about 15 minutes  We discussed the difficulty of maintaining fad diets, the importance of caloric monitoring and restriction for weight loss, and how diet quality can affect cholesterol levels  I also emphasized the importance of routine cardio aerobic exercise and address some of the patient's barriers to beginning an exercise regimen  Review of Systems   Constitutional: Negative for activity change and fatigue  HENT: Negative for facial swelling  Eyes: Negative for visual disturbance     Respiratory: Negative for chest tightness and shortness of breath  Cardiovascular: Negative for chest pain, palpitations and leg swelling  Gastrointestinal: Negative for nausea and vomiting  Musculoskeletal: Negative for myalgias  Skin: Negative for pallor  Allergic/Immunologic: Negative for immunocompromised state  Neurological: Negative for dizziness, syncope and light-headedness  Psychiatric/Behavioral: Negative for agitation and confusion  The patient is not nervous/anxious  Past Medical History:   Diagnosis Date    Anxiety      Past Surgical History:   Procedure Laterality Date    CHOLECYSTECTOMY LAPAROSCOPIC N/A 4/6/2021    Procedure: CHOLECYSTECTOMY LAPAROSCOPIC;  Surgeon: Andrew Emmanuel DO;  [facility];  Service: General    ECTOPIC PREGNANCY SURGERY       Social History     Substance and Sexual Activity   Alcohol Use Yes    Comment: socially     Social History     Substance and Sexual Activity   Drug Use Yes    Types: Marijuana     Social History     Tobacco Use   Smoking Status Never Smoker   Smokeless Tobacco Never Used     Family History   Problem Relation Age of Onset    Diabetes Mother     Uterine cancer Mother     Diabetes Father     Coronary artery disease Father     Heart disease Father     Hearing loss Father        Allergies: Allergies   Allergen Reactions    Amoxicillin Facial Swelling       Medications (as of START of this encounter): Outpatient Medications Prior to Visit   Medication Sig Dispense Refill    Ascorbic Acid (Vitamin C) 100 MG CHEW Chew      ascorbic acid (VITAMIN C) 250 MG CHEW Chew 250 mg daily      cholecalciferol (VITAMIN D3) 25 mcg (1,000 units) tablet Take 1,000 Units by mouth daily      famotidine (PEPCID) 20 mg tablet   0 Refill(s), Type: Maintenance      loratadine-pseudoephedrine (CLARITIN-D 12-HOUR) 5-120 mg per tablet Take 1 tablet by mouth daily as needed         No facility-administered medications prior to visit           Vitals:    06/21/22 1119 BP: 106/64   Pulse: 67     Weight (last 2 days)     Date/Time Weight    06/21/22 1119 117 (258)          General: Rochelle Celaya is a well appearing female, in no acute distress, sitting comfortably  HEENT: moist mucous membranes, EOMI  Neck:  No JVD, supple, trachea midline   Cardiovascular: unremarkable S1/S2, regular rate and rhythm, no murmurs, rubs or gallops   Pulmonary: normal respiratory effort, CTAB   Abdomen: soft and nondistended  Extremities: No lower extremity edema  Warm and well perfused extremities  Neuro: no focal motor deficits, AAOx3 (person, place, time)  Psych: Normal mood and affect, cooperative      Laboratory Studies:    Laboratory studies personally reviewed    Cardiac testing:     EKG reviewed personally:   EKG today shows normal sinus rhythm, 67 bpm, normal axis, normal intervals  Low voltage  Borderline  Time Spent:  Total time (face-to-face and non-face-to-face) spent on today's visit was 40 minutes  This includes preparation for the visits (i e  reviewing test results), performance of a medically appropriate history and examination, and orders for medications, tests or other procedures  This time is exclusive of procedures performed and time spent teaching  Martha Zacarias MD    This note was completed in part utilizing [employer] direct voice recognition software  Grammatical errors, random word insertion, spelling mistakes, occasional wrong word or "sound-alike" substitutions and incomplete sentences may be an occasional consequence of the system secondary to software limitations, ambient noise and hardware issues  At the time of dictation, efforts were made to edit, clarify and /or correct errors  Please read the chart carefully and recognize, using context, where substitutions have occurred    If you have any questions or concerns about the context, text or information contained within the body of this dictation, please contact myself, the provider, for further clarification

## 2022-06-27 ENCOUNTER — TELEPHONE (OUTPATIENT)
Dept: CARDIOLOGY CLINIC | Facility: CLINIC | Age: 47
End: 2022-06-27

## 2022-06-27 ENCOUNTER — HOSPITAL ENCOUNTER (OUTPATIENT)
Dept: NON INVASIVE DIAGNOSTICS | Facility: HOSPITAL | Age: 47
Discharge: HOME | End: 2022-06-27
Payer: COMMERCIAL

## 2022-06-27 DIAGNOSIS — R00.1 BRADYCARDIA: ICD-10-CM

## 2022-06-27 PROCEDURE — 93225 XTRNL ECG REC<48 HRS REC: CPT

## 2022-06-27 PROCEDURE — 93226 XTRNL ECG REC<48 HR SCAN A/R: CPT

## 2022-06-27 PROCEDURE — 93227 XTRNL ECG REC<48 HR R&I: CPT | Performed by: INTERNAL MEDICINE

## 2022-06-27 NOTE — TELEPHONE ENCOUNTER
P/c'd and said she saw the results of holter and wanted you to know that between 3-4 pm she was doing heavy lifting, spring cleaning      Thank you

## 2022-06-27 NOTE — TELEPHONE ENCOUNTER
This patient saw Dr Gevena Mcardle ( Dr Sheela Landin is on vacation this week), on 6/21 with c/o bradycardia as noted on her Apple Watch  Dr Gevena Mcardle ordered a 24 hour holter monitor which patient completed by is not yet read  This AM, patient called c/o nausea, diarrhea, feeling tightness in chest   She feels very tired  Not short of breath, no chest pain  HR 70s-80s  She said her head feels funny  I asked cardiology to expedite the holter monitor result  She asked about symptoms of a heart attack  I discussed at length  She said sometimes she has "pain in the side of her neck", which has been occurring for a while  I advised her to go to the ER if she is worried that she may be having a heart attack  Pt states she had covid early in [facility] 68 is unsure if any symptoms r/t that   She said she still coughs  I told her I would contact the covering doctor if any further recommendations  Please advise

## 2022-07-14 DIAGNOSIS — S16.1XXA STRAIN OF NECK MUSCLE, INITIAL ENCOUNTER: Primary | ICD-10-CM

## 2022-07-14 RX ORDER — CYCLOBENZAPRINE HCL 5 MG
5 TABLET ORAL 3 TIMES DAILY PRN
Qty: 6 TABLET | Refills: 0 | Status: SHIPPED | OUTPATIENT
Start: 2022-07-14 | End: 2022-07-16

## 2022-07-14 NOTE — PATIENT INSTRUCTIONS
Acute Neck Pain   AMBULATORY CARE:   Acute neck pain  starts suddenly, increases quickly, and goes away in a few days  The pain may come and go, or be worse with certain movements  The pain may be only in your neck, or it may move to your arms, back, or shoulders  You may also have pain that starts in another body area and moves to your neck  Seek care immediately if:   You have an injury that causes neck pain and shooting pain down your arms or legs  Your neck pain suddenly becomes severe  You have neck pain along with numbness, tingling, or weakness in your arms or legs  You have a stiff neck, a headache, and a fever  Call your doctor if:   You have new or worsening symptoms  Your symptoms continue even after treatment  You have questions or concerns about your condition or care  Treatment  may include any of the following, depending on what is causing your pain:  Medicines  may be prescribed or recommended for pain  You may need medicine to treat nerve pain or to stop muscle spasms  Medicines may also be given to reduce inflammation  Your healthcare provider may inject medicine into a nerve to block pain  Over-the-counter NSAID medicine or acetaminophen may be recommended to help treat minor pain or inflammation  Traction  is used to relieve pressure from nerves  Your head is gently pulled up and away from your neck  This stretches muscles and ligaments and makes more room for the spine  Your healthcare provider will tell you the kind of traction that will help your neck pain  Do not use traction devices at home unless directed by your healthcare provider  Manage or prevent acute neck pain:   Rest your neck as directed  Do not make sudden movements, such as turning your head quickly  Your healthcare provider may recommend you wear a cervical collar for a short time  The collar will prevent you from moving your head   This will give your neck time to heal if an injury is causing your neck pain  Ask your healthcare provider when you can return to sports or other normal daily activities  Apply heat as directed  Heat helps relieve pain and swelling  Use a heat wrap, or soak a small towel in warm water  Wring out the extra water  Apply the heat wrap or towel for 20 minutes every hour, or as directed  Apply ice as directed  Ice helps relieve pain and swelling, and can help prevent tissue damage  Use an ice pack, or put ice in a bag  Cover the ice pack or back with a towel before you apply it to your neck  Apply the ice pack or ice for 15 minutes every hour, or as directed  Your healthcare provider can tell you how often to apply ice  Do neck exercises as directed  Neck exercises help strengthen the muscles and increase range of motion  Your healthcare provider will tell you which exercises are right for you  He or she may give you instructions or recommend that you work with a physical therapist  Your healthcare provider or therapist can make sure you are doing the exercises correctly  Maintain good posture  Try to keep your head and shoulders lifted when you sit  If you work in front of a computer, make sure the monitor is at the right level  You should not need to look up down to see the screen  You should also not have to lean forward to be able to read what is on the screen  Make sure your keyboard, mouse, and other computer items are placed where you do not have to extend your shoulder to reach them  Get up often if you work in front of a computer or sit for long periods of time  Stretch or walk around to keep your neck muscles loose  Follow up with your doctor as directed:  He or she may refer you to a specialist if your pain does not get better with treatment  Write down your questions so you remember to ask them during your visits    © Copyright [employer] 2022 Information is for End User's use only and may not be sold, redistributed or otherwise used for commercial purposes  All illustrations and images included in CareNotes® are the copyrighted property of A D A M , Inc  or [employer]  The above information is an  only  It is not intended as medical advice for individual conditions or treatments  Talk to your doctor, nurse or pharmacist before following any medical regimen to see if it is safe and effective for you

## 2022-07-20 ENCOUNTER — NURSE TRIAGE (OUTPATIENT)
Dept: OTHER | Facility: OTHER | Age: 47
End: 2022-07-20

## 2022-07-20 ENCOUNTER — HOSPITAL ENCOUNTER (EMERGENCY)
Facility: HOSPITAL | Age: 47
Discharge: HOME | End: 2022-07-21
Attending: EMERGENCY MEDICINE | Admitting: EMERGENCY MEDICINE
Payer: COMMERCIAL

## 2022-07-20 DIAGNOSIS — M79.604 ACUTE LEG PAIN, RIGHT: Primary | ICD-10-CM

## 2022-07-20 LAB
ALBUMIN SERPL BCP-MCNC: 4.1 G/DL (ref 3.5–5)
ALP SERPL-CCNC: 80 U/L (ref 34–104)
ALT SERPL W P-5'-P-CCNC: 13 U/L (ref 7–52)
ANION GAP SERPL CALCULATED.3IONS-SCNC: 8 MMOL/L (ref 4–13)
AST SERPL W P-5'-P-CCNC: 14 U/L (ref 13–39)
BASOPHILS # BLD AUTO: 0.02 THOUSANDS/ΜL (ref 0–0.1)
BASOPHILS NFR BLD AUTO: 0 % (ref 0–1)
BILIRUB SERPL-MCNC: 0.44 MG/DL (ref 0.2–1)
BUN SERPL-MCNC: 21 MG/DL (ref 5–25)
CALCIUM SERPL-MCNC: 9.5 MG/DL (ref 8.4–10.2)
CARDIAC TROPONIN I PNL SERPL HS: <2 NG/L
CHLORIDE SERPL-SCNC: 104 MMOL/L (ref 96–108)
CO2 SERPL-SCNC: 26 MMOL/L (ref 21–32)
CREAT SERPL-MCNC: 0.78 MG/DL (ref 0.6–1.3)
EOSINOPHIL # BLD AUTO: 0.03 THOUSAND/ΜL (ref 0–0.61)
EOSINOPHIL NFR BLD AUTO: 0 % (ref 0–6)
ERYTHROCYTE [DISTWIDTH] IN BLOOD BY AUTOMATED COUNT: 14.5 % (ref 11.6–15.1)
GFR SERPL CREATININE-BSD FRML MDRD: 90 ML/MIN/1.73SQ M
GLUCOSE SERPL-MCNC: 103 MG/DL (ref 65–140)
HCT VFR BLD AUTO: 41.3 % (ref 34.8–46.1)
HGB BLD-MCNC: 13 G/DL (ref 11.5–15.4)
IMM GRANULOCYTES # BLD AUTO: 0.04 THOUSAND/UL (ref 0–0.2)
IMM GRANULOCYTES NFR BLD AUTO: 1 % (ref 0–2)
LYMPHOCYTES # BLD AUTO: 1.31 THOUSANDS/ΜL (ref 0.6–4.47)
LYMPHOCYTES NFR BLD AUTO: 18 % (ref 14–44)
MCH RBC QN AUTO: 28.3 PG (ref 26.8–34.3)
MCHC RBC AUTO-ENTMCNC: 31.5 G/DL (ref 31.4–37.4)
MCV RBC AUTO: 90 FL (ref 82–98)
MONOCYTES # BLD AUTO: 0.45 THOUSAND/ΜL (ref 0.17–1.22)
MONOCYTES NFR BLD AUTO: 6 % (ref 4–12)
NEUTROPHILS # BLD AUTO: 5.46 THOUSANDS/ΜL (ref 1.85–7.62)
NEUTS SEG NFR BLD AUTO: 75 % (ref 43–75)
NRBC BLD AUTO-RTO: 0 /100 WBCS
PLATELET # BLD AUTO: 343 THOUSANDS/UL (ref 149–390)
PMV BLD AUTO: 9.9 FL (ref 8.9–12.7)
POTASSIUM SERPL-SCNC: 4.1 MMOL/L (ref 3.5–5.3)
PROT SERPL-MCNC: 8.2 G/DL (ref 6.4–8.4)
RBC # BLD AUTO: 4.59 MILLION/UL (ref 3.81–5.12)
SODIUM SERPL-SCNC: 138 MMOL/L (ref 135–147)
WBC # BLD AUTO: 7.31 THOUSAND/UL (ref 4.31–10.16)

## 2022-07-20 PROCEDURE — 84484 ASSAY OF TROPONIN QUANT: CPT | Performed by: EMERGENCY MEDICINE

## 2022-07-20 PROCEDURE — 36415 COLL VENOUS BLD VENIPUNCTURE: CPT

## 2022-07-20 PROCEDURE — 80053 COMPREHEN METABOLIC PANEL: CPT | Performed by: EMERGENCY MEDICINE

## 2022-07-20 PROCEDURE — 85025 COMPLETE CBC W/AUTO DIFF WBC: CPT | Performed by: EMERGENCY MEDICINE

## 2022-07-20 PROCEDURE — 99284 EMERGENCY DEPT VISIT MOD MDM: CPT

## 2022-07-21 ENCOUNTER — HOSPITAL ENCOUNTER (OUTPATIENT)
Dept: VASCULAR ULTRASOUND | Facility: HOSPITAL | Age: 47
Discharge: HOME | End: 2022-07-21
Attending: EMERGENCY MEDICINE
Payer: COMMERCIAL

## 2022-07-21 VITALS
RESPIRATION RATE: 18 BRPM | HEART RATE: 88 BPM | TEMPERATURE: 98.3 F | OXYGEN SATURATION: 99 % | DIASTOLIC BLOOD PRESSURE: 65 MMHG | BODY MASS INDEX: 43.77 KG/M2 | SYSTOLIC BLOOD PRESSURE: 114 MMHG | WEIGHT: 255 LBS

## 2022-07-21 DIAGNOSIS — M79.604 ACUTE LEG PAIN, RIGHT: ICD-10-CM

## 2022-07-21 PROCEDURE — 99282 EMERGENCY DEPT VISIT SF MDM: CPT | Performed by: EMERGENCY MEDICINE

## 2022-07-21 PROCEDURE — 93971 EXTREMITY STUDY: CPT

## 2022-07-21 PROCEDURE — 93971 EXTREMITY STUDY: CPT | Performed by: SURGERY

## 2022-07-21 NOTE — TELEPHONE ENCOUNTER
Regarding: suspects blood clot in leg   ----- Message from [facility] sent at 7/20/2022  8:31 PM EDT -----  "im wondering if someone can call me back   i feel okay but [place] had a blood clot in the past in one of my legs, i had covid at the beginning of june, i went and sat down i touched the back of my leg, im not sure why because i usually dont do that, feels like it hurts and swollen, i looked and i have a swollen red area on my leg and it reminds me of the clot before, it feels slightly different so im trying to determine if i should go be seen or if i should come in tomorrow and have someone look at it, what to do"

## 2022-07-21 NOTE — TELEPHONE ENCOUNTER
Bennie Ng has called the [facility] office in regards to a suspected blood clot behind her leg  Asked Renu Bashir if the area has any pain or discomfort, which Renu Bashir denied  Renu Bashir would like to know if there is any recommendations the  office could provide

## 2022-07-21 NOTE — TELEPHONE ENCOUNTER
Reason for Disposition   History of prior "blood clot" in leg or lungs (i e , deep vein thrombosis, pulmonary embolism)    Answer Assessment - Initial Assessment Questions  1  ONSET: "When did the pain start?"       Today    2  LOCATION: "Where is the pain located?"       Right leg above back of knee    3  PAIN: "How bad is the pain?"    (Scale 1-10; or mild, moderate, severe)    -  MILD (1-3): doesn't interfere with normal activities     -  MODERATE (4-7): interferes with normal activities (e g , work or school) or awakens from sleep, limping     -  SEVERE (8-10): excruciating pain, unable to do any normal activities, unable to walk    1/10    4  WORK OR EXERCISE: "Has there been any recent work or exercise that involved this part of the body?"       Denies    5  CAUSE: "What do you think is causing the leg pain?"      Blood clot, hx of DVT     6  OTHER SYMPTOMS: "Do you have any other symptoms?" (e g , chest pain, back pain, breathing difficulty, swelling, rash, fever, numbness, weakness)      Redness and swelling  to back of right leg    7   PREGNANCY: "Is there any chance you are pregnant?" "When was your last menstrual period?"      N/A    Protocols used: LEG PAIN-ADULT-

## 2022-07-21 NOTE — ED PROVIDER NOTES
History  Chief Complaint   Patient presents with    Leg Pain     Pt reports right leg pain, hx of blood clots    Neck Pain       History provided by:  Patient   used: No    24-year-old female presents for evaluation of about a day of right lateral distal thigh pain  She reports a history of DVT  No longer on anticoagulation  She denies any calf pain, shortness of breath, chest pain  No recent surgery, immobilization, smoking, travel  Labs done prior to my evaluation unremarkable  Unable to get duplex at this time but given location of her pain DVT is less likely  More likely muscular strain, IT band strain  Will send for outpatient duplex  Prior to Admission Medications   Prescriptions Last Dose Informant Patient Reported? Taking?    Ascorbic Acid (Vitamin C) 100 MG CHEW  Self Yes No   Sig: Chew   ascorbic acid (VITAMIN C) 250 MG CHEW  Self Yes No   Sig: Chew 250 mg daily   cholecalciferol (VITAMIN D3) 25 mcg (1,000 units) tablet  Self Yes No   Sig: Take 1,000 Units by mouth daily   cyclobenzaprine (FLEXERIL) 5 mg tablet   No No   Sig: Take 1 tablet (5 mg total) by mouth 3 (three) times a day as needed for muscle spasms for up to 2 days   famotidine (PEPCID) 20 mg tablet  Self Yes No   Si Refill(s), Type: Maintenance   loratadine-pseudoephedrine (CLARITIN-D 12-HOUR) 5-120 mg per tablet  Self Yes No   Sig: Take 1 tablet by mouth daily as needed        Facility-Administered Medications: None       Past Medical History:   Diagnosis Date    Anxiety        Past Surgical History:   Procedure Laterality Date    CHOLECYSTECTOMY LAPAROSCOPIC N/A 2021    Procedure: CHOLECYSTECTOMY LAPAROSCOPIC;  Surgeon: Ata Dwyer DO;  [facility];  Service: General    ECTOPIC PREGNANCY SURGERY         Family History   Problem Relation Age of Onset    Heart disease Mother     Diabetes Mother     Uterine cancer Mother     Diabetes Father     Coronary artery disease Father CABG x3, late 46s,  at 76    Heart disease Father     Hearing loss Father      I have reviewed and agree with the history as documented  E-Cigarette/Vaping    E-Cigarette Use Current Some Day User     Comments CBD      E-Cigarette/Vaping Substances    CBD Yes      Social History     Tobacco Use    Smoking status: Never Smoker    Smokeless tobacco: Never Used   Vaping Use    Vaping Use: Some days    Substances: CBD   Substance Use Topics    Alcohol use: Yes     Comment: socially    Drug use: Yes     Types: Marijuana       Review of Systems   Constitutional: Negative for activity change, appetite change, fatigue and fever  Respiratory: Negative for cough, chest tightness and shortness of breath  Cardiovascular: Negative for chest pain and leg swelling  Gastrointestinal: Negative for nausea and vomiting  Musculoskeletal: Negative for back pain and neck pain  Skin: Negative for color change, pallor and rash  Neurological: Negative for dizziness, weakness, numbness and headaches  All other systems reviewed and are negative  Physical Exam  Physical Exam  Vitals and nursing note reviewed  Constitutional:       Appearance: Normal appearance  She is not ill-appearing  HENT:      Head: Normocephalic and atraumatic  Cardiovascular:      Rate and Rhythm: Normal rate and regular rhythm  Pulses: Normal pulses  Pulmonary:      Effort: Pulmonary effort is normal  No respiratory distress  Musculoskeletal:         General: No swelling  Normal range of motion  Cervical back: Normal range of motion and neck supple  Comments: Mild focal tenderness of the distal lateral right thigh  Skin:     General: Skin is warm and dry  Capillary Refill: Capillary refill takes less than 2 seconds  Findings: No bruising, erythema or lesion  Neurological:      General: No focal deficit present  Mental Status: She is alert and oriented to person, place, and time  Psychiatric:         Mood and Affect: Mood normal          Behavior: Behavior normal          Vital Signs  ED Triage Vitals   Temperature Pulse Respirations Blood Pressure SpO2   07/20/22 2211 07/20/22 2211 07/20/22 2211 07/20/22 2211 07/20/22 2211   98 3 °F (36 8 °C) 91 18 140/87 99 %      Temp Source Heart Rate Source Patient Position - Orthostatic VS BP Location FiO2 (%)   07/20/22 2211 07/21/22 0030 07/21/22 0030 07/21/22 0030 --   Oral Monitor Lying Right arm       Pain Score       07/20/22 2211       3           Vitals:    07/20/22 2211 07/21/22 0030   BP: 140/87 114/65   Pulse: 91 88   Patient Position - Orthostatic VS:  Lying         Visual Acuity      ED Medications  Medications - No data to display    Diagnostic Studies  Results Reviewed     Procedure Component Value Units Date/Time    [facility] draw [[id number]] Collected: 07/20/22 2312    Lab Status: Final result Specimen: Blood from Arm, Right Updated: 07/21/22 0103    Narrative: The following orders were created for panel order [facility] draw  Procedure                               Abnormality         Status                     ---------                               -----------         ------                     [facility]othi [facility][[id number]]                           Final result                 Please view results for these tests on the individual orders      HS Troponin 0hr (reflex protocol) [[id number]]  (Normal) Collected: 07/20/22 2312    Lab Status: Final result Specimen: Blood from Arm, Right Updated: 07/20/22 2354     hs TnI 0hr <2 ng/L     Comprehensive metabolic panel [[id number]] Collected: 07/20/22 2312    Lab Status: Final result Specimen: Blood from Arm, Right Updated: 07/20/22 2348     Sodium 138 mmol/L      Potassium 4 1 mmol/L      Chloride 104 mmol/L      CO2 26 mmol/L      ANION GAP 8 mmol/L      BUN 21 mg/dL      Creatinine 0 78 mg/dL      Glucose 103 mg/dL      Calcium 9 5 mg/dL      AST 14 U/L      ALT 13 U/L      Alkaline Phosphatase 80 U/L      Total Protein 8 2 g/dL      Albumin 4 1 g/dL      Total Bilirubin 0 44 mg/dL      eGFR 90 ml/min/1 73sq m     Narrative:      National Kidney Disease Foundation guidelines for Chronic Kidney Disease (CKD):     Stage 1 with normal or high GFR (GFR > 90 mL/min/1 73 square meters)    Stage 2 Mild CKD (GFR = 60-89 mL/min/1 73 square meters)    Stage 3A Moderate CKD (GFR = 45-59 mL/min/1 73 square meters)    Stage 3B Moderate CKD (GFR = 30-44 mL/min/1 73 square meters)    Stage 4 Severe CKD (GFR = 15-29 mL/min/1 73 square meters)    Stage 5 End Stage CKD (GFR <15 mL/min/1 73 square meters)  Note: GFR calculation is accurate only with a steady state creatinine    CBC and differential [[id number]] Collected: 07/20/22 231    Lab Status: Final result Specimen: Blood from Arm, Right Updated: 07/20/22 2330     WBC 7 31 Thousand/uL      RBC 4 59 Million/uL      Hemoglobin 13 0 g/dL      Hematocrit 41 3 %      MCV 90 fL      MCH 28 3 pg      MCHC 31 5 g/dL      RDW 14 5 %      MPV 9 9 fL      Platelets 966 Thousands/uL      nRBC 0 /100 WBCs      Neutrophils Relative 75 %      Immat GRANS % 1 %      Lymphocytes Relative 18 %      Monocytes Relative 6 %      Eosinophils Relative 0 %      Basophils Relative 0 %      Neutrophils Absolute 5 46 Thousands/µL      Immature Grans Absolute 0 04 Thousand/uL      Lymphocytes Absolute 1 31 Thousands/µL      Monocytes Absolute 0 45 Thousand/µL      Eosinophils Absolute 0 03 Thousand/µL      Basophils Absolute 0 02 Thousands/µL                  No orders to display              Procedures  Procedures         ED Course                                             MDM  Number of Diagnoses or Management Options  Acute leg pain, right: new and requires workup  Diagnosis management comments: 49-year-old female presented with 1 day of distal right lateral thigh pain  She does report a prior history of DVT  Not currently anticoagulated  She is otherwise asymptomatic  No difficulty ambulating  Referred for outpatient duplex as ED duplex unavailable at this time  Amount and/or Complexity of Data Reviewed  Clinical lab tests: ordered and reviewed    Patient Progress  Patient progress: stable      Disposition  Final diagnoses:   Acute leg pain, right     Time reflects when diagnosis was documented in both MDM as applicable and the Disposition within this note     Time User Action Codes Description Comment    7/21/2022  1:05 AM Blair Colon Add [[id number]] Acute leg pain, right       ED Disposition     ED Disposition   Discharge    Condition   Stable    Date/Time   Thu Jul 21, 2022  1:04 AM    Comment   Charles Arcos discharge to home/self care  Follow-up Information     Follow up With Specialties Details Why Contact Info Additional [address], [place] Internal Medicine   Daphney Baugh [phone] [id number] [facility] Emergency Department Emergency Medicine  If symptoms worsen [address] Emergency Department, [address], [place], [address] [facility], [address]          Discharge Medication List as of 7/21/2022  1:06 AM      CONTINUE these medications which have NOT CHANGED    Details   ! !  Ascorbic Acid (Vitamin C) 100 MG CHEW Chew, Starting Fri 4/22/2022, Historical Med      !! ascorbic acid (VITAMIN C) 250 MG CHEW Chew 250 mg daily, Historical Med      cholecalciferol (VITAMIN D3) 25 mcg (1,000 units) tablet Take 1,000 Units by mouth daily, Historical Med      cyclobenzaprine (FLEXERIL) 5 mg tablet Take 1 tablet (5 mg total) by mouth 3 (three) times a day as needed for muscle spasms for up to 2 days, Starting Thu 7/14/2022, Until Sat 7/16/2022 at 2359, Normal      famotidine (PEPCID) 20 mg tablet   0 Refill(s), Type: Maintenance, Historical Med      loratadine-pseudoephedrine (CLARITIN-D 12-HOUR) 5-120 mg per tablet Take 1 tablet by mouth daily as needed  , Historical Med       !! - Potential duplicate medications found  Please discuss with provider  Outpatient Discharge Orders   VAS lower limb venous duplex study, unilateral/limited   Standing Status: Future Number of Occurrences: 1 Standing Exp   Date: 07/21/26       PDMP Review       Value Time User    PDMP Reviewed  Yes 4/4/2021  1:12 AM Jameson Cao MD          ED Provider  Electronically Signed by           Manuel Ramirez MD  08/08/22 [id number]

## 2022-07-22 NOTE — TELEPHONE ENCOUNTER
Sherryll Favre has called the [facility] in regards to her call from yesterday  Asked Lm Mitchell if she would be able to schedule an appointment for today  Lm Mitchell stated she is currently at work, but would call back to schedule later today  Lm Mitchell ended the call soon after

## 2022-09-06 ENCOUNTER — AMB VIDEO VISIT (OUTPATIENT)
Dept: INTERNAL MEDICINE CLINIC | Facility: CLINIC | Age: 47
End: 2022-09-06

## 2022-09-06 ENCOUNTER — TELEMEDICINE (OUTPATIENT)
Dept: INTERNAL MEDICINE CLINIC | Facility: CLINIC | Age: 47
End: 2022-09-06
Payer: COMMERCIAL

## 2022-09-06 DIAGNOSIS — J06.9 VIRAL UPPER RESPIRATORY TRACT INFECTION: Primary | ICD-10-CM

## 2022-09-06 PROCEDURE — 87636 SARSCOV2 & INF A&B AMP PRB: CPT

## 2022-09-06 PROCEDURE — 99213 OFFICE O/P EST LOW 20 MIN: CPT | Performed by: INTERNAL MEDICINE

## 2022-09-06 RX ORDER — CEFPODOXIME PROXETIL 200 MG/1
200 TABLET, FILM COATED ORAL 2 TIMES DAILY
Qty: 14 TABLET | Refills: 0 | Status: SHIPPED | OUTPATIENT
Start: 2022-09-06 | End: 2022-09-13

## 2022-09-06 NOTE — PROGRESS NOTES
Virtual Regular Visit    Verification of patient location:    Patient is located in the following state in which I hold an active license [place]      Assessment/Plan:    Problem List Items Addressed This Visit        Respiratory    Upper respiratory tract infection - Primary     Patient presents with 7 day history of fatigue, sore throat, productive cough, swelling in the neck, jaw pain, and sinus pressure after being in a large crowd at a concert  She denies fever but has had chills intermittently  Taking mucinex-acetaminophen with minimal relief  Patient reports negative home COVID19 test x3  Denies seeing exudate in the oropharynx    COVID19 PCR test ordered  Cefpodoxime 200 mg BID for 7 Days prescribed   Follow up in 2 days for reevaluation             Relevant Medications    cefpodoxime (VANTIN) 200 mg tablet    Other Relevant Orders    Covid/Flu- Office Collect               Reason for visit is   Chief Complaint   Patient presents with    Virtual Regular Visit        Encounter provider Esther Bear MD    Provider located at [address] [place] [address] [id number] [phone]      Recent Visits  No visits were found meeting these conditions  Showing recent visits within past 7 days and meeting all other requirements  Today's Visits  Date Type Provider Dept   09/06/22 Telemedicine Esther Bear MD [facility] [place]   Showing today's visits and meeting all other requirements  Future Appointments  No visits were found meeting these conditions  Showing future appointments within next 150 days and meeting all other requirements       The patient was identified by name and date of birth  Deepa Cates was informed that this is a telemedicine visit and that the visit is being conducted through [address] [place] [address] Now and patient was informed that this is a secure, HIPAA-compliant platform  She agrees to proceed     My office door was closed   No one else was in the room  She acknowledged consent and understanding of privacy and security of the video platform  The patient has agreed to participate and understands they can discontinue the visit at any time  Patient is aware this is a billable service  Subjective  Maddy Gray is a 48 y o  female presenting with a 7 day history of sore throat, productive cough, jaw pain, neck swelling, and headache  She says that she noticed her symptoms beginning after she attended a concert and what she was in close quarters with a large amount of people  Patient denies ever having a fever but says she has felt fatigued and worn down  She is taking 3 home COVID-19 test, all of which have been negative  She says she has taken Mucinex and Tylenol for her symptoms but they have provided minimal relief  Patient reports her symptoms started as a sore throat and cough  She has not noticed any exudate in the oropharynx  Her symptoms evolved into jaw pain and sinus pressure  Patient reports pain whenever leaning forward in the maxillary region  She additionally endorses a frontal headache that is constant in nature and feels like pressure  She denies any shortness of breath, chest pain, or palpitations  Patient has not noticed any nausea, vomiting, diarrhea  She denies any changes in her vision, loss of consciousness or lightheadedness      Past Medical History:   Diagnosis Date    Anxiety        Past Surgical History:   Procedure Laterality Date    CHOLECYSTECTOMY LAPAROSCOPIC N/A 4/6/2021    Procedure: CHOLECYSTECTOMY LAPAROSCOPIC;  Surgeon: Ileana Dillon DO;  [facility];  Service: General    ECTOPIC PREGNANCY SURGERY         Current Outpatient Medications   Medication Sig Dispense Refill    cefpodoxime (VANTIN) 200 mg tablet Take 1 tablet (200 mg total) by mouth 2 (two) times a day for 7 days 14 tablet 0    Ascorbic Acid (Vitamin C) 100 MG CHEW Chew      ascorbic acid (VITAMIN C) 250 MG CHEW Chew 250 mg daily      cholecalciferol (VITAMIN D3) 25 mcg (1,000 units) tablet Take 1,000 Units by mouth daily      cyclobenzaprine (FLEXERIL) 5 mg tablet Take 1 tablet (5 mg total) by mouth 3 (three) times a day as needed for muscle spasms for up to 2 days 6 tablet 0    famotidine (PEPCID) 20 mg tablet   0 Refill(s), Type: Maintenance      loratadine-pseudoephedrine (CLARITIN-D 12-HOUR) 5-120 mg per tablet Take 1 tablet by mouth daily as needed         No current facility-administered medications for this visit  Allergies   Allergen Reactions    Amoxicillin Facial Swelling       Review of Systems   Constitutional: Positive for chills and fatigue  Negative for fever and unexpected weight change  HENT: Positive for congestion, sinus pressure and sore throat  Negative for rhinorrhea, sinus pain and trouble swallowing  Eyes: Negative for pain and visual disturbance  Respiratory: Negative for cough, chest tightness, shortness of breath and wheezing  Cardiovascular: Negative for chest pain, palpitations and leg swelling  Gastrointestinal: Negative for abdominal distention, abdominal pain, blood in stool, constipation, diarrhea, nausea and vomiting  Genitourinary: Negative for dysuria, frequency and hematuria  Musculoskeletal: Positive for neck pain  Negative for arthralgias, back pain and myalgias  Skin: Negative for color change and rash  Neurological: Positive for headaches  Negative for dizziness, seizures, syncope, speech difficulty, weakness, light-headedness and numbness  Psychiatric/Behavioral: Negative for confusion  The patient is not nervous/anxious  All other systems reviewed and are negative  Video Exam    There were no vitals filed for this visit      No Physical Exam performed as this was a virtual visit     I spent 30 minutes directly with the patient during this visit

## 2022-09-06 NOTE — ASSESSMENT & PLAN NOTE
Patient presents with 7 day history of fatigue, sore throat, productive cough, swelling in the neck, jaw pain, and sinus pressure after being in a large crowd at a concert  She denies fever but has had chills intermittently  Taking mucinex-acetaminophen with minimal relief  Patient reports negative home COVID19 test x3  Denies seeing exudate in the oropharynx    COVID19 PCR test ordered  Cefpodoxime 200 mg BID for 7 Days prescribed   Follow up in 2 days for reevaluation

## 2022-09-07 LAB
FLUAV RNA RESP QL NAA+PROBE: NEGATIVE
FLUBV RNA RESP QL NAA+PROBE: NEGATIVE
SARS-COV-2 RNA RESP QL NAA+PROBE: NEGATIVE

## 2022-09-08 ENCOUNTER — TELEMEDICINE (OUTPATIENT)
Dept: INTERNAL MEDICINE CLINIC | Facility: CLINIC | Age: 47
End: 2022-09-08
Payer: COMMERCIAL

## 2022-09-08 DIAGNOSIS — J06.9 UPPER RESPIRATORY TRACT INFECTION, UNSPECIFIED TYPE: Primary | ICD-10-CM

## 2022-09-08 PROCEDURE — 99213 OFFICE O/P EST LOW 20 MIN: CPT | Performed by: INTERNAL MEDICINE

## 2022-09-08 NOTE — PROGRESS NOTES
Virtual Brief Visit    Patient is located in the following state in which I hold an active license [place]      Assessment/Plan:    Problem List Items Addressed This Visit        Respiratory    Upper respiratory tract infection - Primary     Patient presented two days ago with a 7 day history of fatigue, sore throat, productive cough, swelling in the neck, jaw pain and sinus pressure  She denies any fever   She has been taking mucinex and tylenol prn   COVID19 PCR test negative  Prescribed cefpodoxime 200 mg for 7 days on day 3  Pain in jaw and neck has resolved  She continues to have productive cough    Plan:  Complete antibiotic course  mucinex and tylenol PRN  Continue PO hydration  Patient should go to Urgent care or ED if symptoms get worse               Recent Visits  Date Type Provider Dept   09/06/22 AM[facility] Video Visit  Melanie Saldana MD [facility]   09/06/22 [facility] Daisy Whitney MD [facility]   Showing recent visits within past 7 days and meeting all other requirements  Today's Visits  Date Type Provider Dept   09/08/22 Telemedicine Melanie Saldana MD [facility]   Showing today's visits and meeting all other requirements  Future Appointments  No visits were found meeting these conditions    Showing future appointments within next 150 days and meeting all other requirements         I spent 30 minutes directly with the patient during this visit

## 2022-09-08 NOTE — ASSESSMENT & PLAN NOTE
Patient presented two days ago with a 7 day history of fatigue, sore throat, productive cough, swelling in the neck, jaw pain and sinus pressure  She denies any fever   She has been taking mucinex and tylenol prn   COVID19 PCR test negative  Prescribed cefpodoxime 200 mg for 7 days on day 3  Pain in jaw and neck has resolved    She continues to have productive cough    Plan:  Complete antibiotic course  mucinex and tylenol PRN  Continue PO hydration  Patient should go to Urgent care or ED if symptoms get worse

## 2022-10-04 ENCOUNTER — TELEPHONE (OUTPATIENT)
Dept: INTERNAL MEDICINE CLINIC | Facility: CLINIC | Age: 47
End: 2022-10-04

## 2022-10-04 NOTE — TELEPHONE ENCOUNTER
Adiel Emigdio has called the [facility] in regards to cramping in her left leg  Michelle Brewer stated she has a history of clots in her left leg, stated she had begun to feeling cramping last night, and soreness throughout the day today  Asked Michelle Brewer if she had experiencing any pain, discomfort, or heat, which Quincy denied  Quincy added stating her skin looked fine as well  Obdulia García stated she would like to know if it is an issue she should go to the ER for

## 2022-10-05 NOTE — TELEPHONE ENCOUNTER
Received a call from the patient regarding cramps in her left leg last night  Has a history of DVT  But she denied redness, warmth, swelling or pain with the movements of toes  No fever  Reassured the patient  Asked to take OTC magnesium supplements for 5 days  Also advised to call PCP or to visit ED if she develops warning symptoms  She verbalized understanding and agreed with the plan

## 2022-10-10 ENCOUNTER — NURSE TRIAGE (OUTPATIENT)
Dept: OTHER | Facility: OTHER | Age: 47
End: 2022-10-10

## 2022-10-10 PROCEDURE — 85730 THROMBOPLASTIN TIME PARTIAL: CPT | Performed by: EMERGENCY MEDICINE

## 2022-10-10 PROCEDURE — 85379 FIBRIN DEGRADATION QUANT: CPT | Performed by: EMERGENCY MEDICINE

## 2022-10-10 PROCEDURE — 85025 COMPLETE CBC W/AUTO DIFF WBC: CPT | Performed by: EMERGENCY MEDICINE

## 2022-10-10 PROCEDURE — 36415 COLL VENOUS BLD VENIPUNCTURE: CPT

## 2022-10-10 PROCEDURE — 99284 EMERGENCY DEPT VISIT MOD MDM: CPT

## 2022-10-10 PROCEDURE — 84484 ASSAY OF TROPONIN QUANT: CPT | Performed by: EMERGENCY MEDICINE

## 2022-10-10 PROCEDURE — 85610 PROTHROMBIN TIME: CPT | Performed by: EMERGENCY MEDICINE

## 2022-10-10 PROCEDURE — 80053 COMPREHEN METABOLIC PANEL: CPT | Performed by: EMERGENCY MEDICINE

## 2022-10-11 ENCOUNTER — HOSPITAL ENCOUNTER (EMERGENCY)
Facility: HOSPITAL | Age: 47
Discharge: HOME | End: 2022-10-11
Attending: EMERGENCY MEDICINE
Payer: COMMERCIAL

## 2022-10-11 ENCOUNTER — APPOINTMENT (OUTPATIENT)
Dept: RADIOLOGY | Facility: HOSPITAL | Age: 47
End: 2022-10-11
Payer: COMMERCIAL

## 2022-10-11 ENCOUNTER — HOSPITAL ENCOUNTER (OUTPATIENT)
Dept: RADIOLOGY | Facility: MEDICAL CENTER | Age: 47
Discharge: HOME | End: 2022-10-11
Payer: COMMERCIAL

## 2022-10-11 ENCOUNTER — TELEPHONE (OUTPATIENT)
Dept: VASCULAR SURGERY | Facility: CLINIC | Age: 47
End: 2022-10-11

## 2022-10-11 VITALS
HEART RATE: 88 BPM | WEIGHT: 255 LBS | BODY MASS INDEX: 43.54 KG/M2 | OXYGEN SATURATION: 100 % | HEIGHT: 64 IN | TEMPERATURE: 98.3 F | SYSTOLIC BLOOD PRESSURE: 150 MMHG | DIASTOLIC BLOOD PRESSURE: 70 MMHG | RESPIRATION RATE: 18 BRPM

## 2022-10-11 DIAGNOSIS — R79.89 ELEVATED D-DIMER: ICD-10-CM

## 2022-10-11 DIAGNOSIS — M79.89 LEFT LEG SWELLING: Primary | ICD-10-CM

## 2022-10-11 DIAGNOSIS — M79.89 LEFT LEG SWELLING: ICD-10-CM

## 2022-10-11 LAB
ALBUMIN SERPL BCP-MCNC: 4.1 G/DL (ref 3.5–5)
ALP SERPL-CCNC: 82 U/L (ref 34–104)
ALT SERPL W P-5'-P-CCNC: 13 U/L (ref 7–52)
ANION GAP SERPL CALCULATED.3IONS-SCNC: 8 MMOL/L (ref 4–13)
APTT PPP: 23 SECONDS (ref 23–37)
AST SERPL W P-5'-P-CCNC: 15 U/L (ref 13–39)
BASOPHILS # BLD AUTO: 0.03 THOUSANDS/ΜL (ref 0–0.1)
BASOPHILS NFR BLD AUTO: 0 % (ref 0–1)
BILIRUB SERPL-MCNC: 0.48 MG/DL (ref 0.2–1)
BUN SERPL-MCNC: 15 MG/DL (ref 5–25)
CALCIUM SERPL-MCNC: 9.3 MG/DL (ref 8.4–10.2)
CARDIAC TROPONIN I PNL SERPL HS: <2 NG/L
CHLORIDE SERPL-SCNC: 103 MMOL/L (ref 96–108)
CO2 SERPL-SCNC: 24 MMOL/L (ref 21–32)
CREAT SERPL-MCNC: 0.64 MG/DL (ref 0.6–1.3)
D DIMER PPP FEU-MCNC: 0.61 UG/ML FEU
EOSINOPHIL # BLD AUTO: 0.03 THOUSAND/ΜL (ref 0–0.61)
EOSINOPHIL NFR BLD AUTO: 0 % (ref 0–6)
ERYTHROCYTE [DISTWIDTH] IN BLOOD BY AUTOMATED COUNT: 14.6 % (ref 11.6–15.1)
GFR SERPL CREATININE-BSD FRML MDRD: 105 ML/MIN/1.73SQ M
GLUCOSE SERPL-MCNC: 102 MG/DL (ref 65–140)
HCT VFR BLD AUTO: 41.5 % (ref 34.8–46.1)
HGB BLD-MCNC: 13.2 G/DL (ref 11.5–15.4)
IMM GRANULOCYTES # BLD AUTO: 0.03 THOUSAND/UL (ref 0–0.2)
IMM GRANULOCYTES NFR BLD AUTO: 0 % (ref 0–2)
INR PPP: 0.93 (ref 0.84–1.19)
LYMPHOCYTES # BLD AUTO: 1.65 THOUSANDS/ΜL (ref 0.6–4.47)
LYMPHOCYTES NFR BLD AUTO: 19 % (ref 14–44)
MCH RBC QN AUTO: 28.8 PG (ref 26.8–34.3)
MCHC RBC AUTO-ENTMCNC: 31.8 G/DL (ref 31.4–37.4)
MCV RBC AUTO: 91 FL (ref 82–98)
MONOCYTES # BLD AUTO: 0.52 THOUSAND/ΜL (ref 0.17–1.22)
MONOCYTES NFR BLD AUTO: 6 % (ref 4–12)
NEUTROPHILS # BLD AUTO: 6.57 THOUSANDS/ΜL (ref 1.85–7.62)
NEUTS SEG NFR BLD AUTO: 75 % (ref 43–75)
NRBC BLD AUTO-RTO: 0 /100 WBCS
PLATELET # BLD AUTO: 350 THOUSANDS/UL (ref 149–390)
PMV BLD AUTO: 10.1 FL (ref 8.9–12.7)
POTASSIUM SERPL-SCNC: 3.8 MMOL/L (ref 3.5–5.3)
PROT SERPL-MCNC: 8 G/DL (ref 6.4–8.4)
PROTHROMBIN TIME: 12.6 SECONDS (ref 11.6–14.5)
RBC # BLD AUTO: 4.58 MILLION/UL (ref 3.81–5.12)
SODIUM SERPL-SCNC: 135 MMOL/L (ref 135–147)
WBC # BLD AUTO: 8.83 THOUSAND/UL (ref 4.31–10.16)

## 2022-10-11 PROCEDURE — 96372 THER/PROPH/DIAG INJ SC/IM: CPT

## 2022-10-11 PROCEDURE — 99285 EMERGENCY DEPT VISIT HI MDM: CPT

## 2022-10-11 PROCEDURE — 93971 EXTREMITY STUDY: CPT

## 2022-10-11 PROCEDURE — 93005 ELECTROCARDIOGRAM TRACING: CPT

## 2022-10-11 PROCEDURE — 93971 EXTREMITY STUDY: CPT | Performed by: SURGERY

## 2022-10-11 RX ORDER — ENOXAPARIN SODIUM 100 MG/ML
1 INJECTION SUBCUTANEOUS ONCE
Status: DISCONTINUED | OUTPATIENT
Start: 2022-10-11 | End: 2022-10-11

## 2022-10-11 RX ORDER — ENOXAPARIN SODIUM 150 MG/ML
1 INJECTION SUBCUTANEOUS ONCE
Status: COMPLETED | OUTPATIENT
Start: 2022-10-11 | End: 2022-10-11

## 2022-10-11 RX ADMIN — ENOXAPARIN SODIUM 120 MG: 120 INJECTION SUBCUTANEOUS at 01:57

## 2022-10-11 NOTE — TELEPHONE ENCOUNTER
Left leg discomfort and history of blood clots   Last week at the side in the side in my all way up my thigh in front   Patient has rash and red streak on left leg thigh along with weird feeling on left thigh  Increase in discomfort today  Denies SOB Chest pain or chest tightness

## 2022-10-11 NOTE — ED PROVIDER NOTES
History  Chief Complaint   Patient presents with   • Leg Swelling     Patient reports hx superficial blood clot to left leg 6 months prior - reports new onset swelling and pain to left thigh, concerning for additional blood clots  Not currently on any anticoagulants  Denies SOB, CP  The patient is a 48 y o  female with a past medical history of anxiety and GERD, who presents for evaluation of left leg swelling x 1 week  She complains of "heaviness" and a cramping sensation in her left thigh for the last week  Patient reports no relief with magnesium supplements, heat, rest, and elevation  She states today in addition to the pain she noticed "bumps" on her thigh tracking along her blood vessel up to her groin  Denies calf pain, unilateral leg swelling, redness, warmth, muscle weakness, numbness in the extremities, exogenous estrogen use, smoking, chest pain, or SOB  The patient does spend long periods of time sitting at work, but she does try to walk and exercise her legs throughout the day  She has been wearing compression socks which seems to help  The patient does have a history of superficial thrombophlebitis in the left leg in 9/2021 and in the right leg in 7/2022, but no history of a DVT  She was initially on 45 days of anticoagulation in 09/2021, but is no longer on anticoagulation therapy  The patient was seen by vascular surgery in 12/2021 and was diagnosed with venous insufficiency of the left leg        History provided by:  Patient   used: No    Leg Pain  Location:  Leg  Time since incident:  1 week  Injury: no    Leg location:  L upper leg  Pain details:     Quality:  Cramping    Radiates to:  Does not radiate    Onset quality:  Gradual    Timing:  Intermittent    Progression:  Worsening  Chronicity:  New  Prior injury to area:  No  Ineffective treatments:  Heat, elevation, rest and compression  Associated symptoms: swelling    Associated symptoms: no back pain, no decreased ROM, no fatigue, no fever, no itching, no muscle weakness, no neck pain, no numbness, no stiffness and no tingling    Risk factors: obesity    Risk factors: no concern for non-accidental trauma, no frequent fractures, no known bone disorder and no recent illness        Prior to Admission Medications   Prescriptions Last Dose Informant Patient Reported? Taking? Ascorbic Acid (Vitamin C) 100 MG CHEW  Self Yes No   Sig: Chew   ascorbic acid (VITAMIN C) 250 MG CHEW  Self Yes No   Sig: Chew 250 mg daily   cholecalciferol (VITAMIN D3) 25 mcg (1,000 units) tablet  Self Yes No   Sig: Take 1,000 Units by mouth daily   cyclobenzaprine (FLEXERIL) 5 mg tablet   No No   Sig: Take 1 tablet (5 mg total) by mouth 3 (three) times a day as needed for muscle spasms for up to 2 days   famotidine (PEPCID) 20 mg tablet  Self Yes No   Si Refill(s), Type: Maintenance   loratadine-pseudoephedrine (CLARITIN-D 12-HOUR) 5-120 mg per tablet  Self Yes No   Sig: Take 1 tablet by mouth daily as needed        Facility-Administered Medications: None       Past Medical History:   Diagnosis Date   • Anxiety        Past Surgical History:   Procedure Laterality Date   • CHOLECYSTECTOMY LAPAROSCOPIC N/A 2021    Procedure: CHOLECYSTECTOMY LAPAROSCOPIC;  Surgeon: Shannan Mckeon DO;  Location: [facility];  Service: General   • ECTOPIC PREGNANCY SURGERY         Family History   Problem Relation Age of Onset   • Heart disease Mother    • Diabetes Mother    • Uterine cancer Mother    • Diabetes Father    • Coronary artery disease Father         CABG x3, late 46s,  at 76   • Heart disease Father    • Hearing loss Father      I have reviewed and agree with the history as documented      E-Cigarette/Vaping   • E-Cigarette Use Current Some Day User    • Comments CBD      E-Cigarette/Vaping Substances   • CBD Yes      Social History     Tobacco Use   • Smoking status: Never Smoker   • Smokeless tobacco: Never Used   Vaping Use   • Vaping Use: Some days • Substances: CBD   Substance Use Topics   • Alcohol use: Yes     Comment: socially   • Drug use: Yes     Types: Marijuana       Review of Systems   Constitutional: Negative for chills, fatigue and fever  HENT: Negative for congestion, ear pain, rhinorrhea and sore throat  Eyes: Negative for pain and visual disturbance  Respiratory: Negative for cough, shortness of breath and wheezing  Cardiovascular: Negative for chest pain and palpitations  Gastrointestinal: Negative for abdominal pain, diarrhea, nausea and vomiting  Genitourinary: Negative for dysuria and hematuria  Musculoskeletal: Positive for myalgias  Negative for arthralgias, back pain, neck pain and stiffness  Skin: Positive for color change  Negative for itching, pallor, rash and wound  Neurological: Negative for dizziness, seizures, syncope, weakness, numbness and headaches  All other systems reviewed and are negative  Physical Exam  Physical Exam  Vitals and nursing note reviewed  Constitutional:       General: She is awake  She is not in acute distress  Appearance: Normal appearance  She is well-developed  She is obese  She is not ill-appearing  HENT:      Head: Normocephalic and atraumatic  Right Ear: External ear normal       Left Ear: External ear normal       Nose: Nose normal    Eyes:      General: Lids are normal  Vision grossly intact  Conjunctiva/sclera: Conjunctivae normal    Cardiovascular:      Rate and Rhythm: Normal rate and regular rhythm  Pulses: Normal pulses  Heart sounds: S1 normal and S2 normal  No murmur heard  No friction rub  No gallop  Pulmonary:      Effort: Pulmonary effort is normal  No accessory muscle usage, respiratory distress or retractions  Breath sounds: Normal breath sounds and air entry  No wheezing, rhonchi or rales  Musculoskeletal:      Cervical back: Normal, full passive range of motion without pain and neck supple        Thoracic back: Normal  Lumbar back: Normal       Right lower leg: Normal  No deformity, tenderness or bony tenderness  No edema  Left lower leg: No edema  Right ankle: Normal  No swelling or deformity  No tenderness  Normal pulse  Left ankle: Normal  No swelling or deformity  No tenderness  Normal pulse  Legs:       Comments: Palpable sub-centimeter varicosities on the left thigh, leading up to the groin  The left upper leg is the same circumference of the right upper leg  No swelling, erythema, or warmth of the overlying skin  No calf tenderness bilaterally  Skin:     General: Skin is warm  Capillary Refill: Capillary refill takes less than 2 seconds  Neurological:      Mental Status: She is alert and oriented to person, place, and time  Psychiatric:         Attention and Perception: Attention normal          Mood and Affect: Mood normal          Speech: Speech normal          Behavior: Behavior normal  Behavior is cooperative           Vital Signs  ED Triage Vitals [10/10/22 2323]   Temperature Pulse Respirations Blood Pressure SpO2   98 3 °F (36 8 °C) 92 16 163/80 100 %      Temp Source Heart Rate Source Patient Position - Orthostatic VS BP Location FiO2 (%)   Oral Monitor Sitting Right arm --      Pain Score       1           Vitals:    10/10/22 2323 10/11/22 0100   BP: 163/80 150/70   Pulse: 92 88   Patient Position - Orthostatic VS: Sitting Sitting         ED Medications  Medications   enoxaparin (LOVENOX) subcutaneous injection 120 mg (120 mg Subcutaneous Given 10/11/22 0157)       Diagnostic Studies  Results Reviewed     Procedure Component Value Units Date/Time    HS Troponin 0hr (reflex protocol) [[id number]]  (Normal) Collected: 10/10/22 2340    Lab Status: Final result Specimen: Blood from Arm, Left Updated: 10/11/22 0030     hs TnI 0hr <2 ng/L     Comprehensive metabolic panel [[id number]] Collected: 10/10/22 2340    Lab Status: Final result Specimen: Blood from Arm, Left Updated: 10/11/22 0028 Sodium 135 mmol/L      Potassium 3 8 mmol/L      Chloride 103 mmol/L      CO2 24 mmol/L      ANION GAP 8 mmol/L      BUN 15 mg/dL      Creatinine 0 64 mg/dL      Glucose 102 mg/dL      Calcium 9 3 mg/dL      AST 15 U/L      ALT 13 U/L      Alkaline Phosphatase 82 U/L      Total Protein 8 0 g/dL      Albumin 4 1 g/dL      Total Bilirubin 0 48 mg/dL      eGFR 105 ml/min/1 73sq m     Narrative:      National Kidney Disease Foundation guidelines for Chronic Kidney Disease (CKD):   •  Stage 1 with normal or high GFR (GFR > 90 mL/min/1 73 square meters)  •  Stage 2 Mild CKD (GFR = 60-89 mL/min/1 73 square meters)  •  Stage 3A Moderate CKD (GFR = 45-59 mL/min/1 73 square meters)  •  Stage 3B Moderate CKD (GFR = 30-44 mL/min/1 73 square meters)  •  Stage 4 Severe CKD (GFR = 15-29 mL/min/1 73 square meters)  •  Stage 5 End Stage CKD (GFR <15 mL/min/1 73 square meters)  Note: GFR calculation is accurate only with a steady state creatinine    D-dimer, quantitative [[id number]]  (Abnormal) Collected: 10/10/22 2340    Lab Status: Final result Specimen: Blood from Arm, Left Updated: 10/11/22 0023     D-Dimer, Quant 0 61 ug/ml FEU     CBC and differential [[id number]] Collected: 10/10/22 2340    Lab Status: Final result Specimen: Blood from Arm, Left Updated: 10/11/22 0018     WBC 8 83 Thousand/uL      RBC 4 58 Million/uL      Hemoglobin 13 2 g/dL      Hematocrit 41 5 %      MCV 91 fL      MCH 28 8 pg      MCHC 31 8 g/dL      RDW 14 6 %      MPV 10 1 fL      Platelets 051 Thousands/uL      nRBC 0 /100 WBCs      Neutrophils Relative 75 %      Immat GRANS % 0 %      Lymphocytes Relative 19 %      Monocytes Relative 6 %      Eosinophils Relative 0 %      Basophils Relative 0 %      Neutrophils Absolute 6 57 Thousands/µL      Immature Grans Absolute 0 03 Thousand/uL      Lymphocytes Absolute 1 65 Thousands/µL      Monocytes Absolute 0 52 Thousand/µL      Eosinophils Absolute 0 03 Thousand/µL      Basophils Absolute 0 03 Thousands/µL Willis Johnson [[id number]]  (Normal) Collected: 10/10/22 2340    Lab Status: Final result Specimen: Blood from Arm, Left Updated: 10/11/22 0018     Protime 12 6 seconds      INR 0 93    APTT [[id number]]  (Normal) Collected: 10/10/22 2340    Lab Status: Final result Specimen: Blood from Arm, Left Updated: 10/11/22 0018     PTT 23 seconds                  VAS lower limb venous duplex study, unilateral/limited    (Results Pending)              Procedures  ECG 12 Lead Documentation Only    Date/Time: 10/11/2022 12:47 AM  Performed by: Inocencia Martinez PA-C  Authorized by: Inocencia Martinez PA-C     ECG reviewed by me, the ED Provider: yes    Patient location:  ED  Interpretation:     Interpretation: non-specific    Rate:     ECG rate:  86    ECG rate assessment: normal    Rhythm:     Rhythm: sinus rhythm    Ectopy:     Ectopy: none    QRS:     QRS intervals:  Normal  Conduction:     Conduction: normal    ST segments:     ST segments:  Normal  T waves:     T waves: normal    Comments:      DC interval: 164ms  QRS duration: 72ms  QT/QTc: 344/411ms           ED Course  ED Course as of 10/11/22 0909   Tue Oct 11, 2022   0045 hs TnI 0hr: <2   0045 D-Dimer, Quant(!): 0 61         SBIRT 22yo+    Flowsheet Row Most Recent Value   SBIRT (25 yo +)    In order to provide better care to our patients, we are screening all of our patients for alcohol and drug use  Would it be okay to ask you these screening questions?  Unable to answer at this time Filed at: 10/10/2022 2324            Wells' Criteria for DVT    Flowsheet Row Most Recent Value   Wells' Criteria for DVT    Active cancer Treatment or palliation within 6 months 0 Filed at: 10/11/2022 0115   Bedridden recently >3 days or major surgery within 12 weeks 0 Filed at: 10/11/2022 0115   Calf swelling >3 cm compared to the other leg 0 Filed at: 10/11/2022 0115   Entire leg swollen 0 Filed at: 10/11/2022 0115   Collateral (nonvaricose) superficial veins present 1 Filed at: 10/11/2022 0115   Localized tenderness along the deep venous system 0 Filed at: 10/11/2022 0115   Pitting edema, confined to symptomatic leg 0 Filed at: 10/11/2022 0115   Paralysis, paresis, or recent plaster immobilization of the lower extremity 0 Filed at: 10/11/2022 0115   Previously documented DVT 0 Filed at: 10/11/2022 0115   Alternative diagnosis to DVT as likely or more likely 2 Filed at: 10/11/2022 0115   Wells DVT Critera Score -1 Filed at: 10/11/2022 0115            MDM  Number of Diagnoses or Management Options  Elevated d-dimer: new and requires workup  Left leg swelling: new and requires workup  Diagnosis management comments: The patient is seen for evaluation of left thigh swelling x 1 week  On exam there are palpable sub-centimeter varicosities in the left thigh without swelling, erythema, or warmth of the extremity  Both legs are the same in circumference and there is no calf tenderness  She has no risk factors for DVT and the Wells criteria for DVT was -1  Her d-dimer was elevated at 0 61  Higher suspicion for superficial thrombophlebitis, but cannot rule out DVT without a VAS duplex scan  Vascular is not available at this time, therefore a STAT outpatient study was ordered  Discussed with patient the R/B/A of anticoagulation with a single dose of Lovenox to cover her until she can get the scan done in the morning  Patient would like to proceed with the Lovenox at this time  Lovenox injection given and the patient was discharged  Currently awaiting the results of her vascular study  Strict return precautions discussed including but not limited to worsening thigh pain, calf pain, unilateral leg swelling, redness, warmth, muscle weakness, chest pain, or shortness of breath  Patient verbalized understanding         Amount and/or Complexity of Data Reviewed  Clinical lab tests: reviewed  Tests in the radiology section of CPT®: ordered  Review and summarize past medical records: yes  Discuss the patient with other providers: yes  Independent visualization of images, tracings, or specimens: yes    Risk of Complications, Morbidity, and/or Mortality  Presenting problems: moderate  Diagnostic procedures: moderate  Management options: moderate    Patient Progress  Patient progress: stable      Disposition  Final diagnoses:   Left leg swelling   Elevated d-dimer     Time reflects when diagnosis was documented in both MDM as applicable and the Disposition within this note     Time User Action Codes Description Comment    10/11/2022  1:09 AM Marcie Herrmann [[id number]] Left leg swelling     10/11/2022  1:09 AM Marcie Herrmann [[id number]] Elevated d-dimer       ED Disposition     ED Disposition   Discharge    Condition   Stable    Date/Time   Tue Oct 11, 2022  1:09 AM    Comment   Waqar Garcia discharge to home/self care  Follow-up Information    None         Discharge Medication List as of 10/11/2022  1:34 AM      CONTINUE these medications which have NOT CHANGED    Details   ! ! Ascorbic Acid (Vitamin C) 100 MG CHEW Chew, Starting Fri 4/22/2022, Historical Med      !! ascorbic acid (VITAMIN C) 250 MG CHEW Chew 250 mg daily, Historical Med      cholecalciferol (VITAMIN D3) 25 mcg (1,000 units) tablet Take 1,000 Units by mouth daily, Historical Med      cyclobenzaprine (FLEXERIL) 5 mg tablet Take 1 tablet (5 mg total) by mouth 3 (three) times a day as needed for muscle spasms for up to 2 days, Starting Thu 7/14/2022, Until Sat 7/16/2022 at 2359, Normal      famotidine (PEPCID) 20 mg tablet   0 Refill(s), Type: Maintenance, Historical Med      loratadine-pseudoephedrine (CLARITIN-D 12-HOUR) 5-120 mg per tablet Take 1 tablet by mouth daily as needed  , Historical Med       !! - Potential duplicate medications found  Please discuss with provider  Outpatient Discharge Orders   VAS lower limb venous duplex study, unilateral/limited   Standing Status: Future Standing Exp   Date: 10/11/26       [facility] Review       Value Time User    PDMP Reviewed  Yes 4/4/2021  1:12 AM Dyan Elaine MD          ED Provider  Electronically Signed by           Jeana Freeman PA-C  10/14/22 8244

## 2022-10-11 NOTE — TELEPHONE ENCOUNTER
Pt called to inform us she went to ED last night  She has been having increased heaviness, fullness and aching in her limbs  She is c/o tingling, pain and cramps in fingers and toes and muscles  For approximately one week her LLE has been bothering her  She notes blue line and lumps [facility] up her L thigh up to her hip  She believes it is a vein  She has been measuring her calf for one week and it has not increased in size  She has been wearing compression stockings for a few weeks but notes they "sit on the vein" and she removes in evening after work  Pt states she is concerned because she has history of superficial blood clots  She was scheduled to have LLE EVLT w/ stab phlebectomies in April by Dr Angelica Carrasco however surgery was cancelled due to toe infection  A venous doppler was ordered by ED and is scheduled for today at 1045  Pt has the following questions/concerns:    1  Her D Dimer is elevated (0 61 in ED last evening) previously it had been normal     2   Her platelets have been increasing, they are w/in normal limits however she is ? If this is of any concern and related to her development of blood clots  3   She is traveling to [place] 10/15/22, she will be passenger in back seat  It is a 4 hour drive and they plan on stopping [place]  She is ? If it will be safe to travel  I advised pt that she should have doppler done as scheduled  I would route her concerns to our triage provider and will f/u w/ her after her doppler is completed w/ recommendations  Pt will not be available from approximately 1:30-4pm due to work meeting

## 2022-10-11 NOTE — TELEPHONE ENCOUNTER
Reason for Disposition  • [1] Red area or streak [2] large (> 2 in  or 5 cm)  • [1] Thigh or calf pain AND [2] only 1 side AND [3] present > 1 hour    Answer Assessment - Initial Assessment Questions  1  ONSET: "When did the swelling start?" (e g , minutes, hours, days)      A week ago    2  LOCATION: "What part of the leg is swollen?"  "Are both legs swollen or just one leg?"     Left thigh     3  SEVERITY: "How bad is the swelling?" (e g , localized; mild, moderate, severe)   - Localized - small area of swelling localized to one leg   - MILD pedal edema - swelling limited to foot and ankle, pitting edema < 1/4 inch (6 mm) deep, rest and elevation eliminate most or all swelling   - MODERATE edema - swelling of lower leg to knee, pitting edema > 1/4 inch (6 mm) deep, rest and elevation only partially reduce swelling   - SEVERE edema - swelling extends above knee, facial or hand swelling present      Moderate  4  REDNESS: "Does the swelling look red or infected?"     Redness    5  PAIN: "Is the swelling painful to touch?" If Yes, ask: "How painful is it?"   (Scale 1-10; mild, moderate or severe)         6  FEVER: "Do you have a fever?" If Yes, ask: "What is it, how was it measured, and when did it start?"          7  CAUSE: "What do you think is causing the leg swelling?"     Possible clot    8  MEDICAL HISTORY: "Do you have a history of heart failure, kidney disease, liver failure, or cancer?"     History of DVT    9  RECURRENT SYMPTOM: "Have you had leg swelling before?" If Yes, ask: "When was the last time?" "What happened that time?"     Yes with blood clot before     10  OTHER SYMPTOMS: "Do you have any other symptoms?" (e g , chest pain, difficulty breathing)       Denies  11   PREGNANCY: "Is there any chance you are pregnant?" "When was your last menstrual period?"       NA    Protocols used: LEG SWELLING AND EDEMA-ADULT-

## 2022-10-11 NOTE — TELEPHONE ENCOUNTER
Regarding: line of bumps/possible blood clot  ----- Message from Luisa Harris sent at 10/10/2022  9:20 PM EDT -----  " I have a line of bumps that start from my knee all the way up to my hip, it kind of looks like how it did when I got blood clots in the past "

## 2022-10-11 NOTE — TELEPHONE ENCOUNTER
Reviewed  No need for anticoagulation at this time  Recommend conservative management with warm compresses, elevation and compression  D dimer is likely elevated secondary to her phlebitis  D dimer can be elevated for multiple reasons however this is a nonspecific test and therefore shouldn't be of any concern  Platelets can be elevated for multiple different reasons   I don't really have an exact answer for this so she should follow up with her pcp  OK to travel however should wear compression stockings and would agree to take breaks during the drive to ambulate

## 2022-10-11 NOTE — TELEPHONE ENCOUNTER
Preliminary results are in consensus, please advise:     PENDING VASCULAR OFFICE APPOINTMENTS:      Indications: Other specified soft tissue disorders [M79 89]  Patient presents with LLE pain and palpable area for about one week  She has a history of DVT and superficial thrombophlebitis, she was seen in the ED last night and given a lovenox injection  Operative History:     No cardiovascular surgeries  Risk Factors  The patient has history of DVT  Impression:  RIGHT LOWER LIMB LIMITED:  Evaluation shows no evidence of thrombus in the common femoral vein  Doppler evaluation shows a normal response to augmentation maneuvers  LEFT LOWER LIMB:   No evidence of acute or chronic deep vein thrombosis    Evidence of superficial thrombophlebitis noted in superficial varicosity at the lateral knee in the area of lump  Doppler evaluation shows a normal response to augmentation maneuvers  Popliteal, posterior tibial and anterior tibial arterial Doppler waveforms are triphasic  Technical findings were given to Dr Cuca Poole via tigertext     Brachial Pressures:  Right: / mmHg  Left: / mmHg

## 2022-10-11 NOTE — TELEPHONE ENCOUNTER
S/w pt and notified of same  She verbalized understanding  She will f/u w/ pcp re: platelets and is going to call back to schedule f/u ov to re-discuss surgery w/ Dr Nicholas Mares  Advised to call w/ and additional questions or concerns

## 2022-10-12 ENCOUNTER — OFFICE VISIT (OUTPATIENT)
Dept: INTERNAL MEDICINE CLINIC | Facility: CLINIC | Age: 47
End: 2022-10-12
Payer: COMMERCIAL

## 2022-10-12 VITALS
DIASTOLIC BLOOD PRESSURE: 98 MMHG | WEIGHT: 260 LBS | OXYGEN SATURATION: 98 % | BODY MASS INDEX: 44.39 KG/M2 | HEIGHT: 64 IN | SYSTOLIC BLOOD PRESSURE: 160 MMHG | TEMPERATURE: 98.7 F | HEART RATE: 75 BPM

## 2022-10-12 DIAGNOSIS — R00.1 BRADYCARDIA: ICD-10-CM

## 2022-10-12 DIAGNOSIS — I80.02 THROMBOPHLEBITIS OF SUPERFICIAL VEINS OF LEFT LOWER EXTREMITY: ICD-10-CM

## 2022-10-12 DIAGNOSIS — E66.1 CLASS 3 DRUG-INDUCED OBESITY WITHOUT SERIOUS COMORBIDITY WITH BODY MASS INDEX (BMI) OF 45.0 TO 49.9 IN ADULT (HCC): ICD-10-CM

## 2022-10-12 DIAGNOSIS — I87.2 VENOUS INSUFFICIENCY OF LEFT LOWER EXTREMITY: ICD-10-CM

## 2022-10-12 DIAGNOSIS — D69.1 ABNORMAL PLATELETS (HCC): ICD-10-CM

## 2022-10-12 DIAGNOSIS — F41.9 ANXIETY: Primary | ICD-10-CM

## 2022-10-12 PROCEDURE — 99215 OFFICE O/P EST HI 40 MIN: CPT | Performed by: HOSPITALIST

## 2022-10-12 RX ORDER — VENLAFAXINE HYDROCHLORIDE 37.5 MG/1
37.5 CAPSULE, EXTENDED RELEASE ORAL
Qty: 30 CAPSULE | Refills: 5 | Status: SHIPPED | OUTPATIENT
Start: 2022-10-12

## 2022-10-13 NOTE — PROGRESS NOTES
INTERNAL MEDICINE FOLLOW-UP OFFICE VISIT  [facility]    NAME: Bennie Ng  AGE: 48 y o  SEX: female  : 1975     DATE: 10/13/2022     Assessment and Plan:     1  Anxiety  Patient has multiple ongoing personal stressors which has exacerbated her anxiety and patient admits ruminating on her symptoms  Initially was reluctant to be on medications but now feels that anxiety is getting worse  Agreeable to be on antianxiety medications but 1 that would not cause weight gain  Will try are Effexor 37 5 mg daily and titrate upwards  - venlafaxine (EFFEXOR-XR) 37 5 mg 24 hr capsule; Take 1 capsule (37 5 mg total) by mouth daily with breakfast  Dispense: 30 capsule; Refill: 5    2  Thrombophlebitis of superficial veins of left lower extremity  No evidence of DVT on Doppler  Venous reflux study demonstrates focal incompetence in the left GSV with incompetent anterior accessory GSV  Was scheduled to have LLE EVLT with stab phlebectomies in April by Dr Bassem Dickey (vascular)-that was however postponed secondary to development of a toe infection  Plans to call vascular hand get this scheduled in the near future       3  Abnormal platelets ([facility])  Discussed her platelet levels and this was 350,000 which is normal   Explained that platelets a usually looked in a range and in her case there has been no significant increased to cause any particular concern currently    4  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 (BMI) of 45 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 49 9 in adult ([facility])  BMI Counseling: Body mass index is 44 63 kg/m²  Discussed the patient's BMI with her  The BMI is above average  BMI counseling and education was provided to the patient  Nutrition recommendations include reducing portion sizes, decreasing overall calorie intake and 3-5 servings of fruits/vegetables daily  Exercise recommendations include exercising 3-5 times per week        5  Bradycardia  Notice that her heart rate would drop to the low 40s on her Apple watch  Has seen Cardiology  [place] that it was sinus bradycardia  EKG had revealed normal sinus rhythm 67 beats per minute with normal axis and low voltage  Holter monitoring recommended  Denies history of snoring, unrefreshed sleep or waking up tired  --if symptoms persist will consider a sleep study in the future    6  Venous insufficiency of left lower extremity  See notes under 2  Venous reflux study demonstrates focal incompetence in the left GSV with incompetent anterior accessory GSV    7-discussed results of her Doppler  Discussed mildly elevated D-dimer which could be secondary to her phlebitis and is nonspecific  Reviewed her BMP and assured her that this was normal    Three months     Chief Complaint:     Chief Complaint   Patient presents with   • Follow-up     Recent ER visit, blood clot issues        History of Present Illness:     Negro Solano presented to the office today with questions regarding her blood work, leg issues and increased anxiety  Patient had symptoms of pain in the leg and underwent a Doppler of the left lower extremity which did not reveal evidence of any DVT  Patient has superficial thrombophlebitis and was scheduled to have LLE EVLT with stab phlebectomies in April by Dr Justice Caba (vascular)-that was however postponed secondary to development of a toe infection  Pt had multiple questions -and we had a long discussion about her condition and finding     1  Her D Dimer is going up - 2-she feels that although her platelets are within normal limits it has been going up as well and whether that was of concern  3-she admits to increased stress at work as well as family issues which has increased her anxiety and which is now causing her considerable distress    She states that she has tried hard not to be on any medications but is now agreeable to starting -but wanted 1 which which would not cause weight gain  4-she will be undergoing a 4 hour drip to which he me a on the 15th and plans to stop every 2 hours or so and walk around  She wanted to know whether that was adequate            The following portions of the patient's history were reviewed and updated as appropriate: allergies, current medications, past family history, past medical history, past social history, past surgical history and problem list      Review of Systems:     Review of Systems complaining of weakness, tiredness, neck pain, increased stress, chest pressure    All other review of symptoms negative unless specified otherwise     Problem List:     Patient Active Problem List   Diagnosis   • Choledocholithiasis   • Superficial thrombophlebitis of lower leg   • Numbness and tingling   • Status post cholecystectomy   • Venous insufficiency of left lower extremity   • Class 3 severe obesity without serious comorbidity with body mass index (BMI) of 45 0 to 49 9 in [facility])   • GERD (gastroesophageal reflux disease)   • Anxiety   • Bradycardia   • Paronychia of great toe of left foot   • Elevated blood pressure reading   • Tonsillolith   • Lymphadenopathy   • COVID-19   • Upper respiratory tract infection        Objective:     /98 (BP Location: Left arm, Patient Position: Sitting, Cuff Size: Standard) Comment: Retook BP: 142/88  Pulse 75   Temp 98 7 °F (37 1 °C) (Tympanic)   Ht 5' 4" (1 626 m)   Wt 118 kg (260 lb)   SpO2 98%   BMI 44 63 kg/m²     Physical Exam   General Appearance:    Alert, cooperative, no distress obese   Head:    Normocephalic, without obvious abnormality, atraumatic   Eyes:    PERRL, conjunctiva/corneas clear, EOM's intact, fundi     benign, both eyes        Neck:   Supple, no adenopathy, no JVD   Back:     Symmetric, no curvature, ROM normal, no CVA tenderness   Lungs:     Clear to auscultation bilaterally, no wheezing or rhonchi   Heart:    Regular rate and rhythm, S1 and S2 normal, no murmur, rub   or gallop Abdomen:     Soft, non-tender, bowel sounds active    Extremities:   Bilateral lower extremity venous stasis Bulging left anterolateral thigh and calf varicosities, scattered spider veins    Psych:   Normal Affect   Neurologic:   CNII-XII intact  Normal strength, sensation and reflexes       Throughout       Pertinent Laboratory/Diagnostic Studies:    Laboratory Results: I have personally reviewed the pertinent laboratory results/reports   I have reviewed the results in detail with the patient      Radiology/Other Diagnostic Testing Results: I have personally reviewed pertinent films in PACS  Time spent more than 45 minutes    More than half time spent counseling and coordinating care    [address]

## 2022-10-14 ENCOUNTER — NURSE TRIAGE (OUTPATIENT)
Dept: OTHER | Facility: OTHER | Age: 47
End: 2022-10-14

## 2022-10-14 LAB
ATRIAL RATE: 86 BPM
P AXIS: 48 DEGREES
PR INTERVAL: 164 MS
QRS AXIS: -10 DEGREES
QRSD INTERVAL: 72 MS
QT INTERVAL: 344 MS
QTC INTERVAL: 411 MS
T WAVE AXIS: 17 DEGREES
VENTRICULAR RATE: 86 BPM

## 2022-10-14 PROCEDURE — 93010 ELECTROCARDIOGRAM REPORT: CPT | Performed by: INTERNAL MEDICINE

## 2022-10-15 NOTE — TELEPHONE ENCOUNTER
Regarding: medical term question  ----- Message from Jorge Luis Chacon sent at 10/14/2022  9:27 PM EDT -----  "i just got a test result of a test that i had earlier this week and its kind of worrying me because it says a certain term thats scaring me because i searched it and it says its like if i had a heart attack   It scares me because I am leaving on a trip tomorrow "

## 2022-10-15 NOTE — TELEPHONE ENCOUNTER
Reason for Disposition  • [1] Follow-up call from patient regarding patient's clinical status AND [2] information NON-URGENT    Additional Information  • Lab result questions    Protocols used: PCP CALL - NO TRIAGE-ADULT-AH, INFORMATION ONLY CALL - NO TRIAGE-ADULT-AH

## 2022-10-15 NOTE — TELEPHONE ENCOUNTER
Calling about abnormal results (D-Dimer, ECG) from 10/10, final result form 10/14  Requesting callback to discuss final results  No further questions

## 2022-10-25 DIAGNOSIS — R07.9 CHEST PAIN, UNSPECIFIED TYPE: Primary | ICD-10-CM

## 2022-11-09 ENCOUNTER — AMB VIDEO VISIT (OUTPATIENT)
Dept: OTHER | Facility: HOSPITAL | Age: 47
End: 2022-11-09

## 2022-11-09 DIAGNOSIS — J06.9 UPPER RESPIRATORY TRACT INFECTION, UNSPECIFIED TYPE: Primary | ICD-10-CM

## 2022-11-09 RX ORDER — AZITHROMYCIN 250 MG/1
TABLET, FILM COATED ORAL
Qty: 6 TABLET | Refills: 0 | Status: SHIPPED | OUTPATIENT
Start: 2022-11-09 | End: 2022-11-13

## 2022-11-09 RX ORDER — FLUTICASONE PROPIONATE 50 MCG
1 SPRAY, SUSPENSION (ML) NASAL DAILY
Qty: 9.9 ML | Refills: 0 | Status: SHIPPED | OUTPATIENT
Start: 2022-11-09

## 2022-11-09 RX ORDER — BENZONATATE 100 MG/1
100 CAPSULE ORAL 3 TIMES DAILY PRN
Qty: 20 CAPSULE | Refills: 0 | Status: SHIPPED | OUTPATIENT
Start: 2022-11-09

## 2022-11-09 NOTE — PROGRESS NOTES
Video Visit - Sho Arevalo 50 y o  female MRN: [id number]    REQUIRED DOCUMENTATION:         1  This service was provided via [employer]  2  Provider located at [address] [place] [id number] [phone]  3  [facility] Nely Chicas PA-C   4  Identify all parties in room with patient during [facility] visit:  No one else  5  After connecting through [employer], patient was identified by name and date of birth  Patient was then informed that this was a Telemedicine visit and that the exam was being conducted confidentially over secure lines  My office door was closed  No one else was in the room  Patient acknowledged consent and understanding of privacy and security of the Telemedicine visit  I informed the patient that I have reviewed their record in Epic and presented the opportunity for them to ask any questions regarding the visit today  The patient agreed to participate  HPI  Patient states she started last Tuesday with a cold  Friday and Saturday started getting worse and she is still not feeling well  She has a runny nose, head congestion, chest rattling and wheezing, coughing which is making her throw up  She denies any fevers, chills, nausea, vomiting, CP  She does have some SOB and tightness  He had 3 negative covid tests and is vaccinated  Physical Exam  Constitutional:       General: She is not in acute distress  Appearance: Normal appearance  She is not ill-appearing, toxic-appearing or diaphoretic  HENT:      Head: Normocephalic and atraumatic  Nose: Congestion present  Comments: TTP over sinuses  Pulmonary:      Effort: Pulmonary effort is normal  No respiratory distress  Comments: Talking in complete sentences, no audible wheezes  Lymphadenopathy:      Cervical: Cervical adenopathy present  Skin:     General: Skin is dry  Neurological:      General: No focal deficit present        Mental Status: She is alert and oriented to person, place, and time  Psychiatric:         Mood and Affect: Mood normal          Behavior: Behavior normal          Diagnoses and all orders for this visit:    Upper respiratory tract infection, unspecified type  -     azithromycin (ZITHROMAX) 250 mg tablet; Take 2 tablets today then 1 tablet daily x 4 days  -     benzonatate (TESSALON PERLES) 100 mg capsule; Take 1 capsule (100 mg total) by mouth 3 (three) times a day as needed for cough  -     fluticasone (FLONASE) 50 mcg/act nasal spray; 1 spray into each nostril daily      Patient Instructions     Acute Bronchitis   WHAT YOU NEED TO KNOW:   Acute bronchitis is swelling and irritation in your lungs  It is usually caused by a virus and most often happens in the winter  Bronchitis may also be caused by bacteria or by a chemical irritant, such as smoke  DISCHARGE INSTRUCTIONS:   Return to the emergency department if:   · You cough up blood  · Your lips or fingernails turn blue  · You feel like you are not getting enough air when you breathe  Call your doctor if:   · Your symptoms do not go away or get worse, even after treatment  · Your cough does not get better within 4 weeks  · You have questions or concerns about your condition or care  Medicines: You may  need any of the following:  · Cough suppressants  decrease your urge to cough  · Decongestants  help loosen mucus in your lungs and make it easier to cough up  This can help you breathe easier  · Inhalers  may be given  Your healthcare provider may give you one or more inhalers to help you breathe easier and cough less  An inhaler gives your medicine to open your airways  Ask your healthcare provider to show you how to use your inhaler correctly  · Antibiotics  may be given for up to 5 days if your bronchitis is caused by bacteria  · Acetaminophen  decreases pain and fever  It is available without a doctor's order   Ask how much to take and how often to take it  Follow directions  Read the labels of all other medicines you are using to see if they also contain acetaminophen, or ask your doctor or pharmacist  Acetaminophen can cause liver damage if not taken correctly  Do not use more than 4 grams (4,000 milligrams) total of acetaminophen in one day  · NSAIDs  help decrease swelling and pain or fever  This medicine is available with or without a doctor's order  NSAIDs can cause stomach bleeding or kidney problems in certain people  If you take blood thinner medicine, always ask your healthcare provider if NSAIDs are safe for you  Always read the medicine label and follow directions  · Take your medicine as directed  Contact your healthcare provider if you think your medicine is not helping or if you have side effects  Tell him of her if you are allergic to any medicine  Keep a list of the medicines, vitamins, and herbs you take  Include the amounts, and when and why you take them  Bring the list or the pill bottles to follow-up visits  Carry your medicine list with you in case of an emergency  Self-care:   · Drink liquids as directed  You may need to drink more liquids than usual to stay hydrated  Ask how much liquid to drink each day and which liquids are best for you  · Use a cool mist humidifier  to increase air moisture in your home  This may make it easier for you to breathe and help decrease your cough  · Get more rest   Rest helps your body to heal  Slowly start to do more each day  Rest when you feel it is needed  · Avoid irritants in the air  Avoid chemicals, fumes, and dust  Wear a face mask if you must work around dust or fumes  Stay inside on days when air pollution levels are high  If you have allergies, stay inside when pollen counts are high  Do not use aerosol products, such as spray-on deodorant, bug spray, and hair spray  · Do not smoke or be around others who are smoking    Nicotine and other chemicals in cigarettes and cigars can cause lung damage  Ask your healthcare provider for information if you currently smoke and need help to quit  E-cigarettes or smokeless tobacco still contain nicotine  Talk to your healthcare provider before you use these products  Prevent acute bronchitis:       1  Ask about vaccines you may need  Get a flu vaccine each year as soon as recommended, usually in September or October  Ask your healthcare provider if you should also get a pneumonia or COVID-19 vaccine  Your healthcare provider can tell you if you should also get other vaccines, and when to get them  2  Prevent the spread of germs  You can decrease your risk for acute bronchitis and other illnesses by doing the following:     ? Wash your hands often with soap and water  Carry germ-killing hand lotion or gel with you  You can use the lotion or gel to clean your hands when soap and water are not available  ? Do not touch your eyes, nose, or mouth unless you have washed your hands first     ? Always cover your mouth when you cough to prevent the spread of germs  It is best to cough into a tissue or your shirt sleeve instead of into your hand  Ask those around you to cover their mouths when they cough  ? Try to avoid people who have a cold or the flu  If you are sick, stay away from others as much as possible  Follow up with your doctor as directed:  Write down questions you have so you will remember to ask them during your follow-up visits  © Copyright [employer] 2022 Information is for End User's use only and may not be sold, redistributed or otherwise used for commercial purposes  All illustrations and images included in CareNotes® are the copyrighted property of A D A M , Inc  or [employer]  The above information is an  only  It is not intended as medical advice for individual conditions or treatments   Talk to your doctor, nurse or pharmacist before following any medical regimen to see if it is safe and effective for you

## 2022-11-09 NOTE — PATIENT INSTRUCTIONS
Acute Bronchitis   WHAT YOU NEED TO KNOW:   Acute bronchitis is swelling and irritation in your lungs  It is usually caused by a virus and most often happens in the winter  Bronchitis may also be caused by bacteria or by a chemical irritant, such as smoke  DISCHARGE INSTRUCTIONS:   Return to the emergency department if:   You cough up blood  Your lips or fingernails turn blue  You feel like you are not getting enough air when you breathe  Call your doctor if:   Your symptoms do not go away or get worse, even after treatment  Your cough does not get better within 4 weeks  You have questions or concerns about your condition or care  Medicines: You may  need any of the following:  Cough suppressants  decrease your urge to cough  Decongestants  help loosen mucus in your lungs and make it easier to cough up  This can help you breathe easier  Inhalers  may be given  Your healthcare provider may give you one or more inhalers to help you breathe easier and cough less  An inhaler gives your medicine to open your airways  Ask your healthcare provider to show you how to use your inhaler correctly  Antibiotics  may be given for up to 5 days if your bronchitis is caused by bacteria  Acetaminophen  decreases pain and fever  It is available without a doctor's order  Ask how much to take and how often to take it  Follow directions  Read the labels of all other medicines you are using to see if they also contain acetaminophen, or ask your doctor or pharmacist  Acetaminophen can cause liver damage if not taken correctly  Do not use more than 4 grams (4,000 milligrams) total of acetaminophen in one day  NSAIDs  help decrease swelling and pain or fever  This medicine is available with or without a doctor's order  NSAIDs can cause stomach bleeding or kidney problems in certain people  If you take blood thinner medicine, always ask your healthcare provider if NSAIDs are safe for you   Always read the medicine label and follow directions  Take your medicine as directed  Contact your healthcare provider if you think your medicine is not helping or if you have side effects  Tell him of her if you are allergic to any medicine  Keep a list of the medicines, vitamins, and herbs you take  Include the amounts, and when and why you take them  Bring the list or the pill bottles to follow-up visits  Carry your medicine list with you in case of an emergency  Self-care:   Drink liquids as directed  You may need to drink more liquids than usual to stay hydrated  Ask how much liquid to drink each day and which liquids are best for you  Use a cool mist humidifier  to increase air moisture in your home  This may make it easier for you to breathe and help decrease your cough  Get more rest   Rest helps your body to heal  Slowly start to do more each day  Rest when you feel it is needed  Avoid irritants in the air  Avoid chemicals, fumes, and dust  Wear a face mask if you must work around dust or fumes  Stay inside on days when air pollution levels are high  If you have allergies, stay inside when pollen counts are high  Do not use aerosol products, such as spray-on deodorant, bug spray, and hair spray  Do not smoke or be around others who are smoking  Nicotine and other chemicals in cigarettes and cigars can cause lung damage  Ask your healthcare provider for information if you currently smoke and need help to quit  E-cigarettes or smokeless tobacco still contain nicotine  Talk to your healthcare provider before you use these products  Prevent acute bronchitis:       Ask about vaccines you may need  Get a flu vaccine each year as soon as recommended, usually in September or October  Ask your healthcare provider if you should also get a pneumonia or COVID-19 vaccine  Your healthcare provider can tell you if you should also get other vaccines, and when to get them  Prevent the spread of germs    You can decrease your risk for acute bronchitis and other illnesses by doing the following:     Wash your hands often with soap and water  Carry germ-killing hand lotion or gel with you  You can use the lotion or gel to clean your hands when soap and water are not available  Do not touch your eyes, nose, or mouth unless you have washed your hands first     Always cover your mouth when you cough to prevent the spread of germs  It is best to cough into a tissue or your shirt sleeve instead of into your hand  Ask those around you to cover their mouths when they cough  Try to avoid people who have a cold or the flu  If you are sick, stay away from others as much as possible  Follow up with your doctor as directed:  Write down questions you have so you will remember to ask them during your follow-up visits  © Copyright [employer] 2022 Information is for End User's use only and may not be sold, redistributed or otherwise used for commercial purposes  All illustrations and images included in CareNotes® are the copyrighted property of [employer]  or [employer]   The above information is an  only  It is not intended as medical advice for individual conditions or treatments  Talk to your doctor, nurse or pharmacist before following any medical regimen to see if it is safe and effective for you

## 2022-11-09 NOTE — CARE ANYWHERE EVISITS
Visit Summary for FRANCOISE KEITA - Gender: Female - Date of Birth: [id number]  Date: [id number] - Duration: 5 minutes  Patient: Nik KEITA  Provider: Fransico Biswas PA-C    Patient Contact Information  Address  [address]   [phone]    Visit Topics  Cold [Added By: Self - 2022-11-09]    Triage Questions   What is your current physical address in the event of a medical emergency? Answer []  Are you allergic to any medications? Answer []  Are you now or could you be pregnant? Answer []  Do you have any immune system compromise or chronic lung   disease? Answer []  Do you have any vulnerable family members in the home (infant, pregnant, cancer, elderly)? Answer []     Conversation Transcripts  [0A][0A] [Notification] You are connected with Fransico Bsiwas PA-C, Urgent Care Specialist [0A][Notification] [facility] is located in [place]  [0A][Notification] Gearl Bottom has shared health history  Luiz Trevino  [0A]    Diagnosis  Acute upper respiratory infection, unspecified    Procedures  Value: [id number] Code: CPT-4 UNLISTED E&M SERVICE    Medications Prescribed    No prescriptions ordered    Electronically signed by: Antoinette Soliman(NPI [id number])

## 2022-11-11 ENCOUNTER — TELEPHONE (OUTPATIENT)
Dept: INTERNAL MEDICINE CLINIC | Facility: CLINIC | Age: 47
End: 2022-11-11

## 2022-11-11 DIAGNOSIS — J06.9 UPPER RESPIRATORY TRACT INFECTION, UNSPECIFIED TYPE: Primary | ICD-10-CM

## 2022-11-11 RX ORDER — METHYLPREDNISOLONE 4 MG/1
TABLET ORAL
Qty: 21 EACH | Refills: 0 | Status: SHIPPED | OUTPATIENT
Start: 2022-11-11

## 2022-11-11 NOTE — PROGRESS NOTES
Patient called discussed symptoms, patient overall feeling better continues with productive cough, congestion, and wheezing  No fever, chest pain or dyspnea  Started Azithromycin yesterday  Send medrol pack prescription today, advice if needed to take Mucinex and continue with hydration

## 2022-11-11 NOTE — TELEPHONE ENCOUNTER
Pt had and evisit on 11/9 and she was put on Azithromycin  She is still having a cough which she states is going on for almost 2 weeks, and continues thru the day and she does not lie down to rest  due to cough   She generally feels improved but she was just worried about the cough  She notes not feeling normal breathing  when climbing steps but resolves rather quickly after activity is complete  She has checked her O2 sat which is 98  She has asked me questions regarding her upcoming stress test and I did state it is best to do when she is well  She also asked if it was ok to take mucinex with Azithromycin and I was not aware of any contraindications  I was not sure if anything else was indicated for this patient  Please review

## 2022-12-09 ENCOUNTER — TELEPHONE (OUTPATIENT)
Dept: CARDIOLOGY CLINIC | Facility: CLINIC | Age: 47
End: 2022-12-09

## 2022-12-09 NOTE — TELEPHONE ENCOUNTER
While she was getting ready for work yesterday morning states she had a weird episode  She had a funny feeling in her head chest and arms that lasted for approximately 30 seconds then had a brief feeling of lightheadedness then felt anxious  She felt upset so she checked her heart rate which was 109  He chest felt tight and she was burping  Blood pressure was 106/60 and then 96/64  Her pcp ordered a stress test and echocardiogram back in October  She scheduled these tests yesterday after having this episode  I did advise her if the symptoms come back she should be evaluated in the ER  She did not want to go yesterday because everytime she goes they tell her everything is fine

## 2022-12-15 ENCOUNTER — HOSPITAL ENCOUNTER (OUTPATIENT)
Dept: NON INVASIVE DIAGNOSTICS | Facility: CLINIC | Age: 47
Discharge: HOME | End: 2022-12-15

## 2022-12-15 VITALS
DIASTOLIC BLOOD PRESSURE: 98 MMHG | HEIGHT: 64 IN | HEART RATE: 75 BPM | SYSTOLIC BLOOD PRESSURE: 160 MMHG | WEIGHT: 260 LBS | BODY MASS INDEX: 44.39 KG/M2

## 2022-12-15 DIAGNOSIS — R07.9 CHEST PAIN, UNSPECIFIED TYPE: ICD-10-CM

## 2022-12-15 LAB
AORTIC ROOT: 2.7 CM
APICAL FOUR CHAMBER EJECTION FRACTION: 69 %
ASCENDING AORTA: 2.7 CM
AV LVOT MEAN GRADIENT: 4 MMHG
AV LVOT PEAK GRADIENT: 7 MMHG
DOP CALC LVOT PEAK VEL VTI: 27 CM
DOP CALC LVOT PEAK VEL: 1.36 M/S
E WAVE DECELERATION TIME: 184 MS
FRACTIONAL SHORTENING: 43 % (ref 28–44)
INTERVENTRICULAR SEPTUM IN DIASTOLE (PARASTERNAL SHORT AXIS VIEW): 0.8 CM
INTERVENTRICULAR SEPTUM: 0.8 CM (ref 0.6–1.1)
LAAS-AP2: 19 CM2
LAAS-AP4: 19.9 CM2
LEFT ATRIUM SIZE: 3.5 CM
LEFT INTERNAL DIMENSION IN SYSTOLE: 2.3 CM (ref 2.1–4)
LEFT VENTRICULAR INTERNAL DIMENSION IN DIASTOLE: 4 CM (ref 3.5–6)
LEFT VENTRICULAR POSTERIOR WALL IN END DIASTOLE: 0.8 CM
LEFT VENTRICULAR STROKE VOLUME: 53 ML
LVSV (TEICH): 53 ML
MV E'TISSUE VEL-SEP: 12 CM/S
MV PEAK A VEL: 0.58 M/S
MV PEAK E VEL: 115 CM/S
MV STENOSIS PRESSURE HALF TIME: 53 MS
MV VALVE AREA P 1/2 METHOD: 4.15 CM2
RIGHT VENTRICLE ID DIMENSION: 3.4 CM
SL CV LEFT ATRIUM LENGTH A2C: 5.4 CM
SL CV LV EF: 65
SL CV PED ECHO LEFT VENTRICLE DIASTOLIC VOLUME (MOD BIPLANE) 2D: 72 ML
SL CV PED ECHO LEFT VENTRICLE SYSTOLIC VOLUME (MOD BIPLANE) 2D: 19 ML
TR MAX PG: 29 MMHG
TR PEAK VELOCITY: 2.7 M/S
TRICUSPID VALVE PEAK REGURGITATION VELOCITY: 2.68 M/S

## 2022-12-16 ENCOUNTER — TELEPHONE (OUTPATIENT)
Dept: INTERNAL MEDICINE CLINIC | Facility: CLINIC | Age: 47
End: 2022-12-16

## 2022-12-16 NOTE — TELEPHONE ENCOUNTER
Spoke with pt over the phone,  Echo results was discussed with pt, she was informed that ECHO results is normal   Pt was encouraged to follow with NM stress test  She notified that her insurance didn't approve the test yet and it might be postponed  Pt is feeling well today, denies chest pain  She will follow with healthy diet to prevent heartburn  Pt was able to ask questions and expressed understanding

## 2022-12-16 NOTE — TELEPHONE ENCOUNTER
Arielle Carter has called the [facility] office in regards to the echo complete done yesterday, 12/15/2022  Ras Otero wished to discuss the echo complete results with a provider

## 2022-12-19 ENCOUNTER — HOSPITAL ENCOUNTER (OUTPATIENT)
Dept: NON INVASIVE DIAGNOSTICS | Facility: CLINIC | Age: 47
Discharge: HOME | End: 2022-12-19

## 2023-04-13 NOTE — ASSESSMENT & PLAN NOTE
· Patient complaining of chest discomfort that is relieved burping  · She reports that she was on Pepcid while ago with improvement of symptoms  · Unfortunately, she has not been compliant with Pepcid and is taking it as needed for symptoms  · Counseled patient on the need to be consistently on Pepcid for the GERD       · Monitor symptoms No

## 2023-06-27 ENCOUNTER — TELEPHONE (OUTPATIENT)
Dept: VASCULAR SURGERY | Facility: CLINIC | Age: 48
End: 2023-06-27

## 2023-06-27 DIAGNOSIS — I80.00 SUPERFICIAL THROMBOPHLEBITIS OF LOWER LEG: Primary | ICD-10-CM

## 2023-06-27 DIAGNOSIS — I87.2 VENOUS INSUFFICIENCY OF LEFT LOWER EXTREMITY: ICD-10-CM

## 2023-06-27 NOTE — TELEPHONE ENCOUNTER
Spoke with pt  Made her aware a LLE LEV was ordered, and pt should continue compression  Transferred to the Call Center to schedule the LEV

## 2023-06-27 NOTE — TELEPHONE ENCOUNTER
"Pt has h/o thrombophlebitis of the left leg  States that she noted that she started to get the \"same feeling\" in her left leg yesterday, as when she had thrombophlebitis before  This morning she has a raised lump on a vein on her left leg, above her left knee on the lateral side of her thigh  Area is very sensitive to touch  She bumped this area, and had pain  States that the area does appear slightly swollen and red  Wears compression  Pt requested to establish care with Dr Lluvia Oconnor  Has seen Dr Theresa Carrasco in the past  Vandana Will was made for 7/27/23  Pt requested to ask if a doppler should be done prior  Asked pt if she can send a photo of the lump through [address]  Pt stated that she would do that  Awaiting photo  Photos received  Routed to triage to advise    "

## 2023-06-27 NOTE — TELEPHONE ENCOUNTER
Ok to get LLE LEV  Will make further recommendations based on results  Encourage continued compression

## 2023-06-27 NOTE — TELEPHONE ENCOUNTER
Pictures reviewed sent via [address] however difficult to appreciate any significant findings  Recommendations as below

## 2023-06-28 ENCOUNTER — TELEPHONE (OUTPATIENT)
Dept: VASCULAR SURGERY | Facility: CLINIC | Age: 48
End: 2023-06-28

## 2023-06-28 ENCOUNTER — HOSPITAL ENCOUNTER (OUTPATIENT)
Dept: VASCULAR ULTRASOUND | Facility: HOSPITAL | Age: 48
Discharge: HOME | End: 2023-06-28
Payer: COMMERCIAL

## 2023-06-28 DIAGNOSIS — I80.00 SUPERFICIAL THROMBOPHLEBITIS OF LOWER LEG: ICD-10-CM

## 2023-06-28 DIAGNOSIS — I87.2 VENOUS INSUFFICIENCY OF LEFT LOWER EXTREMITY: ICD-10-CM

## 2023-06-28 PROCEDURE — 93971 EXTREMITY STUDY: CPT

## 2023-06-28 NOTE — TELEPHONE ENCOUNTER
Reviewed  Recommend conservative measures with daily compression stockings or ACE bandage, leg elevation, warm compresses to area of discomfort, and ibuprofen for pain relief  If her symptoms worsen, she should notify the office

## 2023-06-28 NOTE — TELEPHONE ENCOUNTER
Received call from Justin Banegas at the vascular lab regarding LEV results  Per Justin Banegas, pt is negative for DVT  She does have an area of phlebitis in the L mid to distal thigh  Advised pt we would send message to our triage provider and we will call her back  Pt OV 7/27 w/ Dr Claudetta Naas

## 2023-06-28 NOTE — TELEPHONE ENCOUNTER
Pt doppler scheduled for today 06/28 at 2pm        ----- Message from Raad Mathis RN sent at 6/28/2023  8:05 AM EDT -----  Regarding: FW: Blood clot possibility/pic  Contact: [phone]  Do we have any openings for the doppler today?  ----- Message -----  From: Alpa Felder  Sent: 6/28/2023   6:14 AM EDT  To: [facility]  Subject: Blood clot possibility/pic                       Hello,  I’m following up on yesterdays message and conversation  My leg has been getting worse  I’ll attach more pictures  My doplar isn’t scheduled till tomorrow morning  I’m making sure it’s safe to continue waiting since this keeps getting larger and more tender  Please let me know, it has to be a clot, it feels like it did before and is getting wider and longer

## 2023-07-25 ENCOUNTER — APPOINTMENT (OUTPATIENT)
Dept: RADIOLOGY | Facility: MEDICAL CENTER | Age: 48
End: 2023-07-25
Payer: COMMERCIAL

## 2023-07-25 ENCOUNTER — OFFICE VISIT (OUTPATIENT)
Dept: OBGYN CLINIC | Facility: MEDICAL CENTER | Age: 48
End: 2023-07-25
Payer: COMMERCIAL

## 2023-07-25 VITALS
HEART RATE: 80 BPM | BODY MASS INDEX: 44.35 KG/M2 | WEIGHT: 259.8 LBS | HEIGHT: 64 IN | SYSTOLIC BLOOD PRESSURE: 117 MMHG | DIASTOLIC BLOOD PRESSURE: 82 MMHG

## 2023-07-25 DIAGNOSIS — M25.511 RIGHT SHOULDER PAIN, UNSPECIFIED CHRONICITY: ICD-10-CM

## 2023-07-25 DIAGNOSIS — M77.8 RIGHT SHOULDER TENDONITIS: ICD-10-CM

## 2023-07-25 DIAGNOSIS — M25.511 ACUTE PAIN OF RIGHT SHOULDER: Primary | ICD-10-CM

## 2023-07-25 PROCEDURE — 99204 OFFICE O/P NEW MOD 45 MIN: CPT | Performed by: ORTHOPAEDIC SURGERY

## 2023-07-25 PROCEDURE — 73030 X-RAY EXAM OF SHOULDER: CPT

## 2023-07-27 ENCOUNTER — OFFICE VISIT (OUTPATIENT)
Dept: VASCULAR SURGERY | Facility: CLINIC | Age: 48
End: 2023-07-27
Payer: COMMERCIAL

## 2023-07-27 VITALS
DIASTOLIC BLOOD PRESSURE: 92 MMHG | HEART RATE: 78 BPM | RESPIRATION RATE: 18 BRPM | OXYGEN SATURATION: 98 % | SYSTOLIC BLOOD PRESSURE: 130 MMHG | WEIGHT: 259 LBS | BODY MASS INDEX: 44.22 KG/M2 | HEIGHT: 64 IN

## 2023-07-27 DIAGNOSIS — I80.00 SUPERFICIAL THROMBOPHLEBITIS OF LOWER LEG: Primary | ICD-10-CM

## 2023-07-27 PROCEDURE — 99213 OFFICE O/P EST LOW 20 MIN: CPT | Performed by: SURGERY

## 2023-07-27 NOTE — PATIENT INSTRUCTIONS
Presents with history of multiple bouts of superficial thrombophlebitis mostly left lower extremity thigh and lateral calf. No obvious history of deep vein thrombosis, does have reflux and a greater saphenous possible anterior sensory vein but her symptom complex is not necessarily consistent with superficial refluxing varicosities. She does have varicosities but does not seem to have significant discomfort. She does have some lower back issues and there is been some neurogenic involvement as well in her bilateral lower extremities. Plan: I am prescribing 15 to 20 mmHg knee-high compression stocking, using warm moist soaks and areas of discomfort superficially left anterior thigh and lateral thigh and calf. I will be happy to see her back in the office should her symptoms progress.

## 2023-07-27 NOTE — PROGRESS NOTES
Assessment/Plan:    Presents with history of multiple bouts of superficial thrombophlebitis mostly left lower extremity thigh and lateral calf. No obvious history of deep vein thrombosis, does have reflux and a greater saphenous possible anterior sensory vein but her symptom complex is not necessarily consistent with superficial refluxing varicosities. She does have varicosities but does not seem to have significant discomfort. She does have some lower back issues and there is been some neurogenic involvement as well in her bilateral lower extremities. Plan: I am prescribing 15 to 20 mmHg knee-high compression stocking, using warm moist soaks and areas of discomfort superficially left anterior thigh and lateral thigh and calf. I will be happy to see her back in the office should her symptoms progress. Diagnoses and all orders for this visit:    Superficial thrombophlebitis of lower leg  -     Jobst Stockings        Subjective:      Patient ID: Zechariah Aparicio is a 50 y.o. female. Patient had a LEV on 6/28/23. Pt states that she had multiple phlebitis in the RLE. Pt c/o tiredness and heaviness in her legs. Pt does wear compression stockings and elevate her legs. HPI    The following portions of the patient's history were reviewed and updated as appropriate: allergies, current medications, past family history, past medical history, past social history, past surgical history and problem list.    Review of Systems   Constitutional: Negative. HENT: Negative. Eyes: Negative. Respiratory: Negative. Cardiovascular: Positive for leg swelling. Gastrointestinal: Negative. Endocrine: Negative. Genitourinary: Negative. Musculoskeletal: Negative. Skin: Negative. Allergic/Immunologic: Negative. Neurological: Negative. Hematological: Negative. Psychiatric/Behavioral: Negative. Objective: There were no vitals taken for this visit.          Physical Exam Oriented x3 no evidence of clinical depression. Eyes:  Sclera non-icteric    Skin: normal without evidence of inflammation    Neck is supple carotid pulses equal bilaterally no bruits heard    Chest lungs clear, heart regular rhythm. Abdomen soft nontender no evidence of pulsatile masses. Pulses are palpable bilateral Femoral, on the left popliteal and posterior tibial, on the right popliteal dorsalis pedis and posterior tibial.  Rather small varicosities are noted throughout her anterior thigh and lateral thigh and calf. There is evidence of superficial thrombophlebitis with cordlike structures left anterior thigh and lateral thigh    Neurological exam intact cranial nerves 2-12 grossly intact no gross motor sensory deficits detected.       Imaging viewed and reviewed with Patient

## 2023-08-15 ENCOUNTER — TELEPHONE (OUTPATIENT)
Dept: VASCULAR SURGERY | Facility: CLINIC | Age: 48
End: 2023-08-15

## 2023-08-15 NOTE — TELEPHONE ENCOUNTER
S/w pt and notified of same, pt verbalized understanding. Pt is going on vacation in September and is concerned. Offered to transfer to call center to schedule ov to be evaluated and discuss w/ provider, pt agrees. Transf to call center.

## 2023-08-15 NOTE — TELEPHONE ENCOUNTER
Pt w/ hx of recurrent superficial thrombophlebitis of LLE. Pt called the office today to report that over the past 2 weeks she noticed increased symptoms in the L thigh. She states that it started getting worse after OV w/ Dr. Reilly Villegas on 7/27. Pt reports a "pulling, stabbing pain, itching, and burning". Pt states that symptoms are intermittent but worse if she does more activity. Symptoms are primarily on the anterior aspect of the thigh but sometimes occur posteriorly as well. She uses "wraps" that she bought on [employer] that have velcro. She said they do help w/ the symptoms but they often fall down when she is walking. She denies any discoloration in the leg. She states that the L thigh is a little more swollen than the R. Informed her I would send a message to our triage provider and we will call her back.

## 2023-08-15 NOTE — TELEPHONE ENCOUNTER
Chart and notes reviewed. Recommend continue conservative management with compression (prescribed by Dr Ana Gooden at last OV), ambulation, and leg elevation. If she needs thigh high compression sent in we can prescribe. She can also try warm compresses and NSAIDs if not otherwise contraindicated. IF symptoms persist or worsening can be seen in OV to evaluate.

## 2023-08-18 ENCOUNTER — OFFICE VISIT (OUTPATIENT)
Dept: VASCULAR SURGERY | Facility: CLINIC | Age: 48
End: 2023-08-18
Payer: COMMERCIAL

## 2023-08-18 VITALS
OXYGEN SATURATION: 100 % | HEART RATE: 61 BPM | DIASTOLIC BLOOD PRESSURE: 86 MMHG | WEIGHT: 259 LBS | HEIGHT: 64 IN | BODY MASS INDEX: 44.22 KG/M2 | SYSTOLIC BLOOD PRESSURE: 130 MMHG

## 2023-08-18 DIAGNOSIS — I87.2 VENOUS INSUFFICIENCY OF LEFT LOWER EXTREMITY: Primary | ICD-10-CM

## 2023-08-18 PROCEDURE — 99213 OFFICE O/P EST LOW 20 MIN: CPT

## 2023-08-18 NOTE — ASSESSMENT & PLAN NOTE
Patient reports pain to left lateral thigh that is burning, aching, and itching in nature. She also notes aching in right calf that is present at rest and with activity. She has been wearing compression wraps, however reports that they frequently fall down so she is unable to wear them when she goes for walks. Patient is expressing frustration regarding persistent symptoms and the effect they have on her lifestyle. She denies any claudication, tissue loss, or motor/sensory loss at this time. She also denies any new symptoms of thrombophlebitis (warm, redness, pain, palpable cord). Plan:  -Emotional support provided and concerns addressed.  -We discussed the pathophysiology of venous insufficiency as well as contributing lifestyle factors.  -We discussed different modalities for weight loss as I believe this would improve patient's symptoms and quality of life. Referral placed to weight management. Also discussed different smart phone applications ([employer]) that may aid patient in weightloss.  -Patient plans to continue compression stocking use, as well as increasing physical activity and reducing stress as able.   -Return to office as needed

## 2023-08-26 ENCOUNTER — NURSE TRIAGE (OUTPATIENT)
Dept: MEDSURG UNIT | Facility: HOSPITAL | Age: 48
End: 2023-08-26

## 2023-08-26 NOTE — TELEPHONE ENCOUNTER
Regarding: back pain/ requesting pain meds  ----- Message from Gabreil Gomes sent at 8/26/2023  7:49 AM EDT -----  " I some how tweaked my back yesterday and wanted to know if it's possible to have some pain medicine called in?"

## 2023-08-26 NOTE — TELEPHONE ENCOUNTER
Reason for Disposition  • Back pain    Answer Assessment - Initial Assessment Questions  1. ONSET: "When did the pain begin?"       Last night around 7p- pain came on while making dinner    2. LOCATION: "Where does it hurt?" (upper, mid or lower back)      Lower back    3. SEVERITY: "How bad is the pain?"  (e.g., Scale 1-10; mild, moderate, or severe)    - MILD (1-3): doesn't interfere with normal activities     - MODERATE (4-7): interferes with normal activities or awakens from sleep     - SEVERE (8-10): excruciating pain, unable to do any normal activities       Right now, rates 3-4/10. With any exertion, 7-8/10    4. PATTERN: "Is the pain constant?" (e.g., yes, no; constant, intermittent)       Constant    5. RADIATION: "Does the pain shoot into your legs or elsewhere?"      Denies radiation    6. CAUSE:  "What do you think is causing the back pain?"       *No Answer*  7. BACK OVERUSE:  "Any recent lifting of heavy objects, strenuous work or exercise?"      Walking a lot more frequently, but denies any recent heavy lifting    8. MEDICATIONS: "What have you taken so far for the pain?" (e.g., nothing, acetaminophen, NSAIDS)      4am- Advil / very minimal relief    9. NEUROLOGIC SYMPTOMS: "Do you have any weakness, numbness, or problems with bowel/bladder control?"      Denies    10. OTHER SYMPTOMS: "Do you have any other symptoms?" (e.g., fever, abdominal pain, burning with urination, blood in urine)        Denies.  Reports she hurt back 10 years ago, slipped on ice    Protocols used: BACK PAIN-ADULT-

## 2023-09-05 ENCOUNTER — APPOINTMENT (OUTPATIENT)
Dept: LAB | Facility: MEDICAL CENTER | Age: 48
End: 2023-09-05
Payer: COMMERCIAL

## 2023-09-05 ENCOUNTER — TELEPHONE (OUTPATIENT)
Dept: INTERNAL MEDICINE CLINIC | Facility: CLINIC | Age: 48
End: 2023-09-05

## 2023-09-05 DIAGNOSIS — N30.00 ACUTE CYSTITIS WITHOUT HEMATURIA: Primary | ICD-10-CM

## 2023-09-05 DIAGNOSIS — R39.15 URINARY URGENCY: ICD-10-CM

## 2023-09-05 DIAGNOSIS — R39.15 URINARY URGENCY: Primary | ICD-10-CM

## 2023-09-05 LAB
BACTERIA UR QL AUTO: ABNORMAL /HPF
BILIRUB UR QL STRIP: NEGATIVE
CLARITY UR: CLEAR
COLOR UR: ABNORMAL
GLUCOSE UR STRIP-MCNC: NEGATIVE MG/DL
HGB UR QL STRIP.AUTO: NEGATIVE
KETONES UR STRIP-MCNC: NEGATIVE MG/DL
LEUKOCYTE ESTERASE UR QL STRIP: NEGATIVE
MUCOUS THREADS UR QL AUTO: ABNORMAL
NITRITE UR QL STRIP: NEGATIVE
NON-SQ EPI CELLS URNS QL MICRO: ABNORMAL /HPF
PH UR STRIP.AUTO: 6 [PH]
PROT UR STRIP-MCNC: ABNORMAL MG/DL
RBC #/AREA URNS AUTO: ABNORMAL /HPF
SP GR UR STRIP.AUTO: 1.01 (ref 1–1.03)
UROBILINOGEN UR STRIP-ACNC: <2 MG/DL
WBC #/AREA URNS AUTO: ABNORMAL /HPF

## 2023-09-05 PROCEDURE — 81001 URINALYSIS AUTO W/SCOPE: CPT

## 2023-09-05 NOTE — TELEPHONE ENCOUNTER
Patient states she thinks she has a urinary infection, hurts and cramp in belly, dribble pee, urgency to pee, no burning ,she can not come to office or go to urgent care, short staffed at work.

## 2023-09-05 NOTE — TELEPHONE ENCOUNTER
Patient stated that for the past 3 days she has been experiencing urgency and frequency in addition to lower abdominal cramping pain/discomfort. .  Discussed with patient concern for UTI. I have ordered a UA with microscopy and reflex to culture.   Patient states that she will have this done at wind up either tonight or tomorrow morning  Discussed that pending results we will start her on antibiotics

## 2023-09-06 ENCOUNTER — NURSE TRIAGE (OUTPATIENT)
Dept: OTHER | Facility: OTHER | Age: 48
End: 2023-09-06

## 2023-09-06 RX ORDER — NITROFURANTOIN 25; 75 MG/1; MG/1
100 CAPSULE ORAL 2 TIMES DAILY
Qty: 10 CAPSULE | Refills: 0 | Status: SHIPPED | OUTPATIENT
Start: 2023-09-06 | End: 2023-09-11

## 2023-09-06 NOTE — TELEPHONE ENCOUNTER
Patient called and states if we give her antibotics can we send it to miguel figueroa, as she is at work and she can pick it up and start it right away.

## 2023-09-06 NOTE — TELEPHONE ENCOUNTER
Patient called back and she will be leaving in one hour and will need the medication to go to [facility], now.

## 2023-09-07 NOTE — TELEPHONE ENCOUNTER
Reason for Disposition  • [1] NEGATIVE urine test (i.e., NI - and LE - and WBC < 10) AND [2] urine symptoms continue or are  worsening    Answer Assessment - Initial Assessment Questions  1. TEST: "Was it a urinalysis or a urine culture for UTI?"     UA and microscopic to culture     2. URINALYSIS RESULT: "Was it positive or negative?"  If positive, document what was positive (e.g., LE, WBC, RBC, Bacteria, Epithelial Cells)     Negative, but waiting for culture to come back     3. FEVER: "Do you have a fever?" If Yes, ask: "What is your temperature, how was it measured, and when did it start?"      Denies    4. FLANK PAIN: "Do you have any pain in your side?"      Yes    5. PREGNANCY: "Is there any chance you are pregnant?" "When was your last menstrual period?"      N/A    6.  PHENAZOPYRIDINE (Uristat, Pyridium): "Have you taken phenazopyridine (turns your urine orange) recently?"      Denies    Protocols used: [facility]INALYSIS [facility]

## 2023-09-07 NOTE — TELEPHONE ENCOUNTER
Regarding: UTI/ Urine test results/ Pain  ----- Message from [id number] [facility] sent at 9/6/2023  9:21 PM EDT -----  "I was sent a prescription for UTI, but no one called me to go over my urine test results. I have been experiencing pain in my thigh and I don't know if this is something that I should worry about. I am not feeling good and I'm going on vacation this Saturday.  I called the office and they did not return my call with the results"

## 2023-09-12 ENCOUNTER — TELEPHONE (OUTPATIENT)
Dept: INTERNAL MEDICINE CLINIC | Facility: CLINIC | Age: 48
End: 2023-09-12

## 2023-09-12 DIAGNOSIS — R10.2 PELVIC PRESSURE IN FEMALE: ICD-10-CM

## 2023-09-12 DIAGNOSIS — Z01.419 WOMEN'S ANNUAL ROUTINE GYNECOLOGICAL EXAMINATION: Primary | ICD-10-CM

## 2023-09-12 NOTE — TELEPHONE ENCOUNTER
I called Ms. Yeni Eason to go over her urinalysis and urine microscopic results, the results are negative for urinary infection. She did complete a course of Macrobid which was emperically started for urinary symptoms suggestive of a possible UTI. She endorses still having some pressure in her pelvis which is intermittent and makes her feel urgency to urinate. Upon further discussion of other possible reasons for the pelvic pressure: she does state she gets constipation intermittently but has not paid attention if symptoms are more prominent when she is constipated. She also reports having spotting/at times clots in between her periods, she has not established care with an gynecologist locally since she hs moved into this area. Ambulatory referral to gynecology is placed. Patient is currently on vacation, She is advised to observe if her pelvic pressure is affected by bowel or menstrual changes. She will  make an appointment to be seen here at that clinic and establish care with  gynecology once she is back from vacation.

## 2023-09-18 ENCOUNTER — EVALUATION (OUTPATIENT)
Dept: PHYSICAL THERAPY | Facility: MEDICAL CENTER | Age: 48
End: 2023-09-18
Payer: COMMERCIAL

## 2023-09-18 DIAGNOSIS — M25.511 ACUTE PAIN OF RIGHT SHOULDER: Primary | ICD-10-CM

## 2023-09-18 DIAGNOSIS — M77.8 RIGHT SHOULDER TENDONITIS: ICD-10-CM

## 2023-09-18 PROCEDURE — 97110 THERAPEUTIC EXERCISES: CPT

## 2023-09-18 PROCEDURE — 97162 PT EVAL MOD COMPLEX 30 MIN: CPT

## 2023-09-18 NOTE — PROGRESS NOTES
PT Evaluation     Today's date: 2023  Patient name: Lex Penn  : 1975  MRN: [id number]  Referring provider: Elida Mohan DO  Dx:   Encounter Diagnosis     ICD-10-CM    1. Acute pain of right shoulder  M25.511 Ambulatory Referral to Physical Therapy      2. Right shoulder tendonitis  M77.8 Ambulatory Referral to Physical Therapy          Start Time: 1700  Stop Time: 1745  Total time in clinic (min): 45 minutes    Assessment  Assessment details: Lex Penn is a 50 y.o. female who presents to PT with a referral from Dr. Bradford Parra for R shoulder pain. Patient presents to PT with limitations in ROM, strength and functional overhead mobility secondary to pain, decreased muscular endurance and joint hypomobility. Patient demonstrated decreased shoulder IR and ER. Patient noted some tenderness during palpation over [facility] joint, clavicle, upper trapezius. Patient's key impairments include: decreased ROM, decreased strength, decreased endurance, pain with functional activities and poor body mechanics. The limitations listed above affect patient's ability to reach overhead, reach, lift/carry, push, pull and exercise, recreational activities, and ADLs and decrease patient's quality of life. Patient to benefit from skilled PT to address these limitations, increase ROM, improve strength, reduce pain, improve activity tolerance and improve quality of life, patient reports that pain is significantly better than HAFSA, patient notes occasional overhead stress and discomfort noted in objective. Patient performed HEP well today.    Impairments: abnormal gait, abnormal muscle firing, abnormal muscle tone, abnormal or restricted ROM, abnormal movement, activity intolerance, impaired physical strength, lacks appropriate home exercise program, pain with function, scapular dyskinesis, weight-bearing intolerance, poor posture  and poor body mechanics  Understanding of Dx/Px/POC: excellent  Goals  Goals  STG (4 Weeks)  Patient will have a decrease in pain at worst by 2 points on the NPRS to improve quality of life in 4 weeks. Patient will be able to drive without pain to improve quality of life in 4 weeks. Patient will be efficient and compliant with comprehensive HEP in 4 weeks. LTG (8 Weeks)  Patient will have a FOTO of anticipated or greater by discharge. Patient will be able to perform household activities without pain by discharge. Patient will be able to return to the work without pain to improve quality of life by discharge. Plan  Planned modality interventions: biofeedback, cryotherapy, electrical stimulation/Russian stimulation, TENS and thermotherapy: hydrocollator packs  Planned therapy interventions: abdominal trunk stabilization, IASTM, activity modification, joint mobilization, ADL retraining, kinesiology taping, manual therapy, ADL training, balance, massage, Rodriguez taping, motor coordination training, muscle pump exercises, nerve gliding, balance/weight bearing training, body mechanics training, breathing training, neuromuscular re-education, canalith repositioning, patient education, postural training, strengthening, stretching, therapeutic activities, therapeutic exercise, therapeutic training, transfer training, IADL retraining, home exercise program, graded activity, graded exercise, graded motor, gait training, flexibility, functional ROM exercises, fine motor coordination training, dressing changes, coordination and compression  Frequency: 2x week  Duration in weeks: 12  Plan of Care beginning date: 9/18/2023  Plan of Care expiration date: 12/11/2023  Treatment plan discussed with: patient        Subjective Evaluation    History of Present Illness  Date of onset: 9/18/2023  Mechanism of injury: Patient is a 50year old female reporting to skilled PT with reports of R shoulder pain. Patient just returned from vacation and noticed that she was having reduced pain.  Patient noted that she first had the acute pain at acromion, shoulder joint, biceps, and scapula. Patient notes that she saw her orthopedic on , summary of appointment noted below:     "HISTORY:  50 y.o. female presents for initial evaluation of right shoulder. Pain started 2 months ago, denies any mechanism of injury or trauma. Does note she was holding/lifting a toddler multiple times through out the weekend before onset of pain. Pain is localized to the anterior shoulder but radiates laterally at times. Pain is rated 2/10 today. Activity aggravates while rest alleviates. Patient has not tried OTC medication. "    Orthopedic assessment noted below:     "ASSESSMENT AND PLAN: 50 y.o. female with acute right shoulder pain. Upon review of the right shoulder x-ray, a thorough history and my examination, Taz Wasserman is presenting with signs and symptoms consistent with tendonitis. Diagnosis and treatment options discussed. Referral to physical therapy provided, recommend attending for 6 weeks. OTC anti-inflammatories as needed. Follow up in 6 weeks if symptoms do not improve."    Patient notes that her pain has improved over the past few weeks.            Recurrent probem    Quality of life: excellent    Patient Goals  Patient goals for therapy: decreased pain, increased motion, independence with ADLs/IADLs and increased strength  Patient goal: Patient's goal is to improve her shoulder pain  Pain  Current pain ratin  At best pain ratin  At worst pain ratin  Location: R shoulder  Quality: dull ache  Relieving factors: change in position, ice, heat, relaxation, rest and support  Aggravating factors: overhead activity, lifting and keyboarding  Progression: improved    Social Support  Steps to enter house: no  Stairs in house: no   Lives in: [place]  Lives with: alone    Employment status: working  Hand dominance: right      Diagnostic Tests  X-ray: normal  Treatments  Previous treatment: physical therapy        Objective Postural Observations  Seated posture: fair  Standing posture: fair  Correction of posture: makes symptoms better        Observations   Left Shoulder   Negative for atrophy. Right Shoulder  Negative for atrophy. Palpation   Left   Hypotonic in the serratus anterior. Muscle spasm in the upper trapezius. Trigger point to upper trapezius. Right   Hypotonic in the lower trapezius, middle deltoid and serratus anterior. Muscle spasm in the upper trapezius. Tenderness of the biceps, infraspinatus and supraspinatus. Trigger point to upper trapezius. Tenderness     Left Shoulder   No tenderness in the [facility] joint, acromion, clavicle, infraspinatus tendon and supraspinatus tendon. Right Shoulder  Tenderness in the [facility] joint, acromion, infraspinatus tendon and supraspinatus tendon. No tenderness in the clavicle.      Neurological Testing     Sensation     Shoulder   Left Shoulder   Intact: light touch and pin prick    Right Shoulder   Intact: light touch and pin prick    Reflexes   Left   Biceps (C5/C6): normal (2+)  Brachioradialis (C6): normal (2+)  Triceps (C7): normal (2+)    Right   Biceps (C5/C6): normal (2+)  Brachioradialis (C6): normal (2+)  Triceps (C7): normal (2+)    Active Range of Motion   Left Shoulder   Flexion: 180 degrees   Extension: 40 degrees   Abduction: 180 degrees   External rotation 0°: 90 degrees   External rotation BTH: T3   Internal rotation 0°: 90 degrees   Internal rotation BTB: T7     Right Shoulder   Flexion: 180 degrees with pain  Extension: 40 degrees   Abduction: 180 degrees   External rotation 0°: 90 degrees   External rotation BTH: T2   Internal rotation 0°: 90 degrees   Internal rotation BTB: T7     Scapular Mobility   Left Shoulder   Scapular Mobility with Shoulder to 90° FF   Scapular winging: minimal  Scapular elevation: minimal    Scapular Mobility beyond 90° FF   Scapular winging: minimal  Scapular elevation: minimal    Right Shoulder   Scapular Mobility with Shoulder to 90° FF   Scapular winging: minimal  Scapular elevation: minimal    Scapular Mobility beyond 90° FF   Scapular winging: minimal  Scapular elevation: minimal    Joint Play   Left Shoulder  Joints within functional limits are the anterior capsule, posterior capsule and inferior capsule. Right Shoulder  Hypomobile in the anterior capsule, posterior capsule and inferior capsule. Strength/Myotome Testing     Left Shoulder     Planes of Motion   Flexion: 5   Extension: 5   Abduction: 5   Adduction: 5   External rotation at 0°: 5   External rotation at 45°: 5   External rotation at 90°: 5   External rotation BTH: 5   Internal rotation at 0°: 5   Internal rotation at 45°: 5   Internal rotation at 90°: 5   Horizontal abduction: 5   Horizontal adduction: 5     Isolated Muscles   Biceps: 5   Lower trapezius: 4   Middle trapezius: 4     Right Shoulder     Planes of Motion   Flexion: 5   Extension: 5   Abduction: 5   Adduction: 5   External rotation at 0°: 5   External rotation at 45°: 5   External rotation at 90°: 5   External rotation BTH: 5   Internal rotation at 0°: 5   Internal rotation at 45°: 5   Internal rotation at 90°: 5   Horizontal abduction: 5   Horizontal adduction: 5     Isolated Muscles   Biceps: 5   Lower trapezius: 4   Middle trapezius: 4     Tests     Left Shoulder   Negative belly press, drop arm, empty can and painful arc. Right Shoulder   Negative belly press, drop arm, empty can, Neer's and painful arc. HEP-performed and reviewed  Access Code: [email or web address]  URL: [email or web address]  Date: 09/18/2023  Prepared by: Natalie Piña    Exercises  - Seated Upper Trapezius Stretch  - 1 x daily - 7 x weekly - 3 sets - 3 reps - 30 hold  - Seated Levator Scapulae Stretch  - 1 x daily - 7 x weekly - 3 sets - 3 reps - 30 hold  - Seated Assisted Cervical Rotation with Towel  - 1 x daily - 7 x weekly - 3 sets - 3 reps - 30 hold  - Seated Scapular Retraction  - 1 x daily - 7 x weekly - 3 sets - 10 reps - 3 hold         Precautions:   Past Medical History:   Diagnosis Date   • Anxiety            Manuals                                                                 Neuro Re-Ed                                                                                                        Ther Ex                                                                                                                     Ther Activity                                       Gait Training                                       Modalities

## 2023-09-26 ENCOUNTER — OFFICE VISIT (OUTPATIENT)
Dept: PHYSICAL THERAPY | Facility: MEDICAL CENTER | Age: 48
End: 2023-09-26
Payer: COMMERCIAL

## 2023-09-26 DIAGNOSIS — M77.8 RIGHT SHOULDER TENDONITIS: ICD-10-CM

## 2023-09-26 DIAGNOSIS — M25.511 ACUTE PAIN OF RIGHT SHOULDER: Primary | ICD-10-CM

## 2023-09-26 PROCEDURE — 97112 NEUROMUSCULAR REEDUCATION: CPT

## 2023-09-26 PROCEDURE — 97110 THERAPEUTIC EXERCISES: CPT

## 2023-09-26 NOTE — PROGRESS NOTES
Daily Note     Today's date: 2023  Patient name: Amada Nguyen  : 1975  MRN: [id number]  Referring provider: Alessio Paz DO  Dx:   Encounter Diagnosis     ICD-10-CM    1. Acute pain of right shoulder  M25.511       2. Right shoulder tendonitis  M77.8           Start Time: 1600  Stop Time: 1645  Total time in clinic (min): 45 minutes    Subjective: Patient reports that her shoulder pain is "okay" reports 1/10 pain. Objective: See treatment diary below  HEP and Performed, all performed with Red theraband  Access Code: [email or web address]  URL: [email or web address]  Date: 2023  Prepared by: Cory Stalling    Exercises  - Seated Upper Trapezius Stretch  - 1 x daily - 7 x weekly - 3 sets - 3 reps - 30 hold  - Seated Levator Scapulae Stretch  - 1 x daily - 7 x weekly - 3 sets - 3 reps - 30 hold  - Seated Assisted Cervical Rotation with Towel  - 1 x daily - 7 x weekly - 3 sets - 3 reps - 30 hold  - Shoulder Internal Rotation with Resistance  - 1 x daily - 7 x weekly - 3 sets - 10 reps - 3 hold  - Shoulder External Rotation with Anchored Resistance  - 1 x daily - 7 x weekly - 3 sets - 10 reps - 3 hold  - Shoulder External Rotation and Scapular Retraction with Resistance  - 1 x daily - 7 x weekly - 3 sets - 10 reps - 3 hold  - Scapular Retraction with Resistance  - 1 x daily - 7 x weekly - 3 sets - 10 reps - 3 hold  - Scapular Retraction with Resistance Advanced  - 1 x daily - 7 x weekly - 3 sets - 10 reps - 3 hold  - Seated Scapular Retraction  - 1 x daily - 7 x weekly - 3 sets - 10 reps - 3 hold  - Doorway Pec Stretch at 90 Degrees Abduction  - 1 x daily - 7 x weekly - 3 sets - 3 reps - 30 hold  - Doorway Pec Stretch at 120 Degrees Abduction  - 1 x daily - 7 x weekly - 3 sets - 3 reps - 30 hold  - Doorway Pec Stretch at 60 Degrees Abduction with Arm Straight  - 1 x daily - 7 x weekly - 3 sets - 3 reps - 30 hold    Assessment: Tolerated treatment well.  Patient demonstrated fatigue post treatment, exhibited good technique with therapeutic exercises and would benefit from continued PT in order to improve shoulder pain. Patient demonstrated adequate performance of HEP, given bands for HEP performance, patient advised to complete progression to bands of darker color as tolerated. Patient verbalized understanding. Plan: Continue per plan of care. Progress treatment as tolerated.       Precautions:   Past Medical History:   Diagnosis Date   • Anxiety            Manuals                                                                 Neuro Re-Ed                                                                                                        Ther Ex                                                                                                                     Ther Activity                                       Gait Training                                       Modalities

## 2023-10-17 ENCOUNTER — OFFICE VISIT (OUTPATIENT)
Dept: PHYSICAL THERAPY | Facility: MEDICAL CENTER | Age: 48
End: 2023-10-17
Payer: COMMERCIAL

## 2023-10-17 DIAGNOSIS — M25.511 ACUTE PAIN OF RIGHT SHOULDER: Primary | ICD-10-CM

## 2023-10-17 DIAGNOSIS — M77.8 RIGHT SHOULDER TENDONITIS: ICD-10-CM

## 2023-10-17 PROCEDURE — 97110 THERAPEUTIC EXERCISES: CPT

## 2023-10-17 PROCEDURE — 97112 NEUROMUSCULAR REEDUCATION: CPT

## 2023-10-17 NOTE — PROGRESS NOTES
Daily Note/discharge    Today's date: 10/17/2023  Patient name: Eddie Briggs  : 1975  MRN: [id number]  Referring provider: Nelson Elder DO  Dx:   Encounter Diagnosis     ICD-10-CM    1. Acute pain of right shoulder  M25.511       2. Right shoulder tendonitis  M77.8           Start Time: 1615  Stop Time: 1700  Total time in clinic (min): 45 minutes    Subjective: Patient reports that she has been using her R arm more, but has been noting a slight increase in pain but notes that she hasn't been performing her HEP as much as she would like to due to time. Patient reports that she would like to make today her last day. Objective: See treatment diary below  Access Code: [email or web address]  URL: [email or web address]  Date: 10/17/2023  Prepared by: David Barnes    Exercises  - Shoulder PNF D2 with Resistance  - 1 x daily - 7 x weekly - 3 sets - 10 reps - 3 hold  - Standing Shoulder Single Arm PNF D2 Extension with Anchored Resistance  - 1 x daily - 7 x weekly - 3 sets - 10 reps - 3 hold  - Wall Clock with Theraband  - 1 x daily - 7 x weekly - 3 sets - 10 reps - 3 hold  - Standing Low Trap Setting with Resistance at [address]  - 1 x daily - 7 x weekly - 3 sets - 10 reps - 3 hold  - Shoulder External Rotation Reactive Isometrics  - 1 x daily - 7 x weekly - 3 sets - 10 reps - 3 hold  - Dynamic Hug with Resistance  - 1 x daily - 7 x weekly - 3 sets - 10 reps - 3 hold  - Wall Push Up with Plus  - 1 x daily - 7 x weekly - 3 sets - 10 reps - 3 hold  - Standing Single Arm Shoulder External Rotation in Abduction with Anchored Resistance  - 1 x daily - 7 x weekly - 3 sets - 10 reps - 3 hold  - Shoulder PNF D2 with Resistance  - 1 x daily - 7 x weekly - 3 sets - 10 reps - 3 hold  - Isometric Standing Shoulder Internal Rotation - 90 Degrees Abduction  - 1 x daily - 7 x weekly - 3 sets - 10 reps - 3 hold    Exercises-performed for review    - Shoulder Internal Rotation with Resistance - 1 x daily - 7 x weekly - 3 sets - 10 reps - 3 hold  - Shoulder External Rotation with Anchored Resistance  - 1 x daily - 7 x weekly - 3 sets - 10 reps - 3 hold      Assessment: Tolerated treatment well. Patient performed her HEP well and progressed HEP, patient challenged with postural positioning but tolerated all interventions well and agrees with POC. Patient will be discharged today due to reaching maximal level of function. Patient instructed to call with any questions, all questions about progress, POC, progression, and interventions addressed. Plan: Continue per plan of care. Progress treatment as tolerated.       Precautions:   Past Medical History:   Diagnosis Date    Anxiety            Manuals                                                                 Neuro Re-Ed                                                                                                        Ther Ex                                                                                                                     Ther Activity                                       Gait Training                                       Modalities

## 2023-10-18 ENCOUNTER — OFFICE VISIT (OUTPATIENT)
Dept: INTERNAL MEDICINE CLINIC | Facility: CLINIC | Age: 48
End: 2023-10-18
Payer: COMMERCIAL

## 2023-10-18 VITALS
HEART RATE: 66 BPM | BODY MASS INDEX: 44.28 KG/M2 | DIASTOLIC BLOOD PRESSURE: 78 MMHG | HEIGHT: 64 IN | SYSTOLIC BLOOD PRESSURE: 132 MMHG | OXYGEN SATURATION: 99 % | WEIGHT: 259.4 LBS

## 2023-10-18 DIAGNOSIS — E66.01 CLASS 3 SEVERE OBESITY DUE TO EXCESS CALORIES WITHOUT SERIOUS COMORBIDITY WITH BODY MASS INDEX (BMI) OF 45.0 TO 49.9 IN ADULT (HCC): ICD-10-CM

## 2023-10-18 DIAGNOSIS — R07.9 CHEST PAIN, UNSPECIFIED TYPE: Primary | ICD-10-CM

## 2023-10-18 DIAGNOSIS — N92.0 MENORRHAGIA WITH REGULAR CYCLE: ICD-10-CM

## 2023-10-18 DIAGNOSIS — I80.00 SUPERFICIAL THROMBOPHLEBITIS OF LOWER LEG: ICD-10-CM

## 2023-10-18 PROBLEM — R20.0 NUMBNESS AND TINGLING: Status: RESOLVED | Noted: 2021-09-19 | Resolved: 2023-10-18

## 2023-10-18 PROBLEM — R20.2 NUMBNESS AND TINGLING: Status: RESOLVED | Noted: 2021-09-19 | Resolved: 2023-10-18

## 2023-10-18 PROBLEM — U07.1 COVID-19: Status: RESOLVED | Noted: 2022-06-06 | Resolved: 2023-10-18

## 2023-10-18 PROBLEM — L03.032 PARONYCHIA OF GREAT TOE OF LEFT FOOT: Status: RESOLVED | Noted: 2022-04-08 | Resolved: 2023-10-18

## 2023-10-18 PROBLEM — J06.9 UPPER RESPIRATORY TRACT INFECTION: Status: RESOLVED | Noted: 2022-09-06 | Resolved: 2023-10-18

## 2023-10-18 PROBLEM — K80.50 CHOLEDOCHOLITHIASIS: Status: RESOLVED | Noted: 2021-04-05 | Resolved: 2023-10-18

## 2023-10-18 PROCEDURE — 99204 OFFICE O/P NEW MOD 45 MIN: CPT | Performed by: INTERNAL MEDICINE

## 2023-10-18 PROCEDURE — 93000 ELECTROCARDIOGRAM COMPLETE: CPT | Performed by: INTERNAL MEDICINE

## 2023-10-18 NOTE — PROGRESS NOTES
Name: Santa March      : 1975      MRN: [id number]  Encounter Provider: Kit Torres MD  Encounter Date: 10/18/2023   [facility]     1. Chest pain, unspecified type  -     NM myocardial perfusion spect (rx stress and/or rest); Future; Expected date: 10/18/2023  -     POCT ECG    2. Class 3 severe obesity due to excess calories without serious comorbidity with body mass index (BMI) of 45.0 to 49.9 in adult [facility])  Assessment & Plan:  Weight loss with fasting and eating one meal a day  Advised to see weight mgt    Orders:  -     CBC and differential; Future  -     Comprehensive metabolic panel; Future  -     Lipid panel; Future  -     HEMOGLOBIN A1C W/ EAG ESTIMATION; Future  -     TSH, 3rd generation with Free T4 reflex; Future    3. Menorrhagia with regular cycle  -     Ferritin; Future    4. Superficial thrombophlebitis of lower leg  Assessment & Plan:  She has seen vascular surgery and advised to use compression stockings      BMI Counseling: Body mass index is 44.53 kg/m². The BMI is above normal. Patient referred to weight management due to patient being morbidly obese. Depression Screening and Follow-up Plan: Patient was screened for depression during today's encounter. They screened negative with a PHQ-2 score of 0. Subjective      Suspects she had COVID in  and has not felt well since  Choledocholithiasis in 2021  Since surgery has felt full easily, feels like food just sits in her stomach  Sees undigested food in her stool  Palpitations/tachycardia recently  Chest pain and SOB with normal echo in December. She was scheduled for a stress test also but it was cancelled because she was sick.  Easily SOB with exertion  Recurrent superficial thrombophlebitis in the left thigh since a fall in October  Last doppler in  showed acute occlusive, superficial thrombophlebitis noted in a cluster of varicose veins at the mid and distal lateral thigh  Worried about her liver  She prefers a holistic approach to treatment vs prescription meds. She takes various supplements        Review of Systems   Constitutional:  Positive for fatigue. Negative for unexpected weight change. Respiratory:  Positive for shortness of breath. Negative for cough. Cardiovascular:  Positive for chest pain. Negative for palpitations. Gastrointestinal:  Negative for constipation and diarrhea. Genitourinary:  Positive for menstrual problem (heavy). Negative for difficulty urinating. Musculoskeletal:  Positive for arthralgias. Neurological:  Positive for dizziness and headaches. Psychiatric/Behavioral:  Negative for sleep disturbance. Current Outpatient Medications on File Prior to Visit   Medication Sig   • Ascorbic Acid (Vitamin C) 100 MG CHEW Chew   • ascorbic acid (VITAMIN C) 250 MG CHEW Chew 250 mg daily   • cholecalciferol (VITAMIN D3) 25 mcg (1,000 units) tablet Take 1,000 Units by mouth daily   • famotidine (PEPCID) 20 mg tablet PRN   • fluticasone (FLONASE) 50 mcg/act nasal spray 1 spray into each nostril daily   • loratadine-pseudoephedrine (CLARITIN-D 12-HOUR) 5-120 mg per tablet Take 1 tablet by mouth daily as needed     • medium chain triglycerides (MCT OIL) oil Take 15 mL by mouth 3 (three) times a day   • Omega-3 Fatty Acids (OMEGA-3 FISH OIL PO) Take by mouth   • ketorolac (TORADOL) 10 mg tablet Take 1 tablet (10 mg total) by mouth every 8 (eight) hours as needed for moderate pain or severe pain for up to 2 days       Objective     /78 (BP Location: Left arm, Patient Position: Sitting, Cuff Size: Large)   Pulse 66   Ht 5' 4" (1.626 m)   Wt 118 kg (259 lb 6.4 oz)   SpO2 99%   BMI 44.53 kg/m²     Physical Exam  Constitutional:       Appearance: She is well-developed. She is obese. She is not ill-appearing.    Eyes:      Conjunctiva/sclera: Conjunctivae normal.   Cardiovascular:      Rate and Rhythm: Normal rate and regular rhythm. Heart sounds: Normal heart sounds. No murmur heard. Pulmonary:      Effort: Pulmonary effort is normal. No respiratory distress. Breath sounds: Normal breath sounds. No wheezing or rales. Abdominal:      General: There is no distension. Palpations: Abdomen is soft. There is no mass. Tenderness: There is abdominal tenderness in the epigastric area. There is no guarding or rebound. Musculoskeletal:      Right shoulder: Normal.      Cervical back: Neck supple. Right lower leg: No edema. Left lower leg: No edema. Neurological:      Mental Status: She is alert and oriented to person, place, and time. Psychiatric:         Mood and Affect: Mood normal.         Behavior: Behavior normal.         Thought Content:  Thought content normal.         Judgment: Judgment normal.       Carissa Saha MD

## 2023-11-28 ENCOUNTER — OFFICE VISIT (OUTPATIENT)
Dept: INTERNAL MEDICINE CLINIC | Facility: CLINIC | Age: 48
End: 2023-11-28
Payer: COMMERCIAL

## 2023-11-28 VITALS
HEART RATE: 92 BPM | BODY MASS INDEX: 44.28 KG/M2 | DIASTOLIC BLOOD PRESSURE: 76 MMHG | TEMPERATURE: 99.7 F | OXYGEN SATURATION: 99 % | SYSTOLIC BLOOD PRESSURE: 126 MMHG | HEIGHT: 64 IN | WEIGHT: 259.4 LBS

## 2023-11-28 DIAGNOSIS — J06.9 UPPER RESPIRATORY TRACT INFECTION, UNSPECIFIED TYPE: Primary | ICD-10-CM

## 2023-11-28 PROCEDURE — 99213 OFFICE O/P EST LOW 20 MIN: CPT | Performed by: INTERNAL MEDICINE

## 2023-11-28 RX ORDER — AZITHROMYCIN 250 MG/1
TABLET, FILM COATED ORAL
Qty: 6 TABLET | Refills: 0 | Status: SHIPPED | OUTPATIENT
Start: 2023-11-28 | End: 2023-12-02

## 2023-11-28 NOTE — PROGRESS NOTES
Name: Rashida Bailey      : 1975      MRN: [id number]  Encounter Provider: Jayme Robertson MD  Encounter Date: 2023   Enc[facility] department: [facility]     1. Upper respiratory tract infection, unspecified type  -     azithromycin (ZITHROMAX) 250 mg tablet; 2 tabs PO today then 1 daily for 4 days    Call if not improving or with worsening symptoms       Subjective      Started with a sore throat cold symptoms hoarseness and productive cough a week ago  Symptoms are not improving  + Chills, no fever  She has tried homeopathic treatment, Coricidin without relief  COVID negative x 2         Review of Systems   Constitutional:  Positive for chills, fatigue and fever. HENT:  Positive for congestion, ear pain, postnasal drip, rhinorrhea, sinus pressure, sore throat and voice change. Respiratory:  Positive for cough and wheezing. Negative for chest tightness and shortness of breath. Gastrointestinal:  Negative for diarrhea, nausea and vomiting. Musculoskeletal:  Positive for myalgias.        Current Outpatient Medications on File Prior to Visit   Medication Sig   • Ascorbic Acid (Vitamin C) 100 MG CHEW Chew   • ascorbic acid (VITAMIN C) 250 MG CHEW Chew 250 mg daily   • cholecalciferol (VITAMIN D3) 25 mcg (1,000 units) tablet Take 1,000 Units by mouth daily   • famotidine (PEPCID) 20 mg tablet PRN   • fluticasone (FLONASE) 50 mcg/act nasal spray 1 spray into each nostril daily   • loratadine-pseudoephedrine (CLARITIN-D 12-HOUR) 5-120 mg per tablet Take 1 tablet by mouth daily as needed     • medium chain triglycerides (MCT OIL) oil Take 15 mL by mouth 3 (three) times a day   • Omega-3 Fatty Acids (OMEGA-3 FISH OIL PO) Take by mouth   • ketorolac (TORADOL) 10 mg tablet Take 1 tablet (10 mg total) by mouth every 8 (eight) hours as needed for moderate pain or severe pain for up to 2 days       Objective     /76 (BP Location: Left arm, Patient Position: Sitting, Cuff Size: Large)   Pulse 92   Temp 99.7 °F (37.6 °C) (Tympanic)   Ht 5' 4" (1.626 m)   Wt 118 kg (259 lb 6.4 oz)   SpO2 99%   BMI 44.53 kg/m²     Physical Exam  Constitutional:       Appearance: She is not ill-appearing. HENT:      Right Ear: External ear normal. There is no impacted cerumen. Left Ear: External ear normal. There is no impacted cerumen. Mouth/Throat:      Pharynx: Posterior oropharyngeal erythema present. No oropharyngeal exudate. Cardiovascular:      Rate and Rhythm: Regular rhythm. Heart sounds: Normal heart sounds. Pulmonary:      Breath sounds: Normal breath sounds. Abdominal:      Palpations: Abdomen is soft. Tenderness: There is no abdominal tenderness. Musculoskeletal:      Cervical back: Neck supple.    Psychiatric:         Mood and Affect: Mood normal.         Behavior: Behavior normal.       Khanh Bazan MD

## 2023-12-04 ENCOUNTER — RA CDI HCC (OUTPATIENT)
Dept: OTHER | Facility: HOSPITAL | Age: 48
End: 2023-12-04

## 2024-02-01 ENCOUNTER — OFFICE VISIT (OUTPATIENT)
Dept: INTERNAL MEDICINE CLINIC | Facility: CLINIC | Age: 49
End: 2024-02-01
Payer: COMMERCIAL

## 2024-02-01 VITALS
BODY MASS INDEX: 44.08 KG/M2 | SYSTOLIC BLOOD PRESSURE: 122 MMHG | HEIGHT: 64 IN | WEIGHT: 258.2 LBS | DIASTOLIC BLOOD PRESSURE: 74 MMHG

## 2024-02-01 DIAGNOSIS — L30.9 ECZEMA, UNSPECIFIED TYPE: Primary | ICD-10-CM

## 2024-02-01 DIAGNOSIS — H01.005 BLEPHARITIS OF LEFT LOWER EYELID, UNSPECIFIED TYPE: ICD-10-CM

## 2024-02-01 PROCEDURE — 99213 OFFICE O/P EST LOW 20 MIN: CPT | Performed by: INTERNAL MEDICINE

## 2024-02-01 RX ORDER — ERYTHROMYCIN 5 MG/G
0.5 OINTMENT OPHTHALMIC EVERY 8 HOURS SCHEDULED
Qty: 3.5 G | Refills: 0 | Status: SHIPPED | OUTPATIENT
Start: 2024-02-01 | End: 2024-02-08

## 2024-02-01 RX ORDER — TRIAMCINOLONE ACETONIDE 0.25 MG/G
CREAM TOPICAL
Qty: 15 G | Refills: 0 | Status: SHIPPED | OUTPATIENT
Start: 2024-02-01 | End: 2024-02-08

## 2024-02-01 NOTE — PROGRESS NOTES
Name: Felicita Mathew      : 1975      MRN: [id number]  Encounter Provider: Lea Reyes, MD  Encounter Date: 2024   Encounter department: [facility]    Assessment & Plan     1. Eczema, unspecified type  -     triamcinolone (KENALOG) 0.025 % cream; Apply topically daily at bedtime for 7 days    2. Blepharitis of left lower eyelid, unspecified type  -     erythromycin (ILOTYCIN) ophthalmic ointment; Administer 0.5 inches into the left eye every 8 (eight) hours for 7 days    Eczema cs contact dermatitis  Discussed short term use of topical steroid   If lash line is irritated, she can use the topical antibiotic  She may use OTC Patanol for when she has watery itchy eyes  Needs to refrain from using eye make up to truly isolate which product is causing/making this worse       Subjective      3months of on and off swelling under the left eye lid with flakiness of the skin. She also notices irritation on the corner of the eye along the lashes. Sometimes, eyes burn and get watery. Vision is unchanged. She regularly uses make up, unsure which product could have caused the symptoms. She has cleaned all her brushes.   Mild sore throat just started today.       Review of Systems   HENT:  Negative for rhinorrhea and sore throat.    Eyes:  Positive for discharge and itching. Negative for redness and visual disturbance.       Current Outpatient Medications on File Prior to Visit   Medication Sig   • Ascorbic Acid (Vitamin C) 100 MG CHEW Chew   • ascorbic acid (VITAMIN C) 250 MG CHEW Chew 250 mg daily   • cholecalciferol (VITAMIN D3) 25 mcg (1,000 units) tablet Take 1,000 Units by mouth daily   • famotidine (PEPCID) 20 mg tablet PRN   • fluticasone (FLONASE) 50 mcg/act nasal spray 1 spray into each nostril daily   • loratadine-pseudoephedrine (CLARITIN-D 12-HOUR) 5-120 mg per tablet Take 1 tablet by mouth daily as needed     • medium chain triglycerides (MCT OIL) oil Take 15 mL by mouth 3  "(three) times a day   • Omega-3 Fatty Acids (OMEGA-3 FISH OIL PO) Take by mouth   • ketorolac (TORADOL) 10 mg tablet Take 1 tablet (10 mg total) by mouth every 8 (eight) hours as needed for moderate pain or severe pain for up to 2 days       Objective     /74 (BP Location: Left arm, Patient Position: Sitting, Cuff Size: Large)   Ht 5' 4\" (1.626 m)   Wt 117 kg (258 lb 3.2 oz)   BMI 44.32 kg/m²     Physical Exam  Constitutional:       Appearance: She is not ill-appearing.   Eyes:      Conjunctiva/sclera: Conjunctivae normal.      Pupils: Pupils are equal, round, and reactive to light.      Comments: Swelling and flakiness on the left lower lid  Lash line is not red       Lea Reyes, MD    "

## 2024-02-28 ENCOUNTER — TELEMEDICINE (OUTPATIENT)
Dept: INTERNAL MEDICINE CLINIC | Facility: CLINIC | Age: 49
End: 2024-02-28
Payer: COMMERCIAL

## 2024-02-28 VITALS — TEMPERATURE: 97.7 F | WEIGHT: 255 LBS | OXYGEN SATURATION: 97 % | BODY MASS INDEX: 43.77 KG/M2 | HEART RATE: 86 BPM

## 2024-02-28 DIAGNOSIS — U07.1 COVID-19 VIRUS INFECTION: Primary | ICD-10-CM

## 2024-02-28 PROCEDURE — 99213 OFFICE O/P EST LOW 20 MIN: CPT | Performed by: INTERNAL MEDICINE

## 2024-02-28 NOTE — PATIENT INSTRUCTIONS
Problem List Items Addressed This Visit          Other    COVID-19 virus infection - Primary     Onset of symptoms 2 days ago.  I recommend treating symptoms.  I recommend isolating for 5 days from onset of symptoms.  Patient can do anything she would normally do for cough and cold symptoms, follow-up if not improving

## 2024-02-28 NOTE — PROGRESS NOTES
Virtual Regular Visit    Verification of patient location:    Patient is located at Home in the following state in which I hold an active license [place]      Assessment/Plan:    Problem List Items Addressed This Visit        Other    COVID-19 virus infection - Primary     Onset of symptoms 2 days ago.  I recommend treating symptoms.  I recommend isolating for 5 days from onset of symptoms.  Patient can do anything she would normally do for cough and cold symptoms, follow-up if not improving                 Reason for visit is   Chief Complaint   Patient presents with   • Virtual Regular Visit     Began Monday with post nasal drip and productive cough, throat irritation, headaches, ear and jaw pain, chills, fever.   • Virtual Regular Visit          Encounter provider Ilan Hanson MD    Provider located at [address] [facility]  [address] [facility] [place] [address]      Recent Visits  No visits were found meeting these conditions.  Showing recent visits within past 7 days and meeting all other requirements  Today's Visits  Date Type Provider Dept   02/28/24 Telemedicine Ilan Hanson MD  [facility]   Showing today's visits and meeting all other requirements  Future Appointments  No visits were found meeting these conditions.  Showing future appointments within next 150 days and meeting all other requirements       The patient was identified by name and date of birth. Felicita Mathew was informed that this is a telemedicine visit and that the visit is being conducted through the Epic Embedded platform. She agrees to proceed..  My office door was closed. No one else was in the room.  She acknowledged consent and understanding of privacy and security of the video platform. The patient has agreed to participate and understands they can discontinue the visit at any time.    Patient is aware this is a billable service.     Subjective  Felicita Mathew is a 48 y.o. female   .      Pt tested positive for COVID         Past Medical History:   Diagnosis Date   • Anxiety    • Choledocholithiasis 04/05/2021   • COVID-19 06/06/2022   • Lymphadenopathy 03/12/2015   • Numbness and tingling 09/19/2021   • Paronychia of great toe of left foot 04/08/2022   • Upper respiratory tract infection 09/06/2022       Past Surgical History:   Procedure Laterality Date   • CHOLECYSTECTOMY LAPAROSCOPIC N/A 4/6/2021    Procedure: CHOLECYSTECTOMY LAPAROSCOPIC;  Surgeon: Kyle Forman DO;  Location: [facility];  Service: General   • ECTOPIC PREGNANCY SURGERY         Current Outpatient Medications   Medication Sig Dispense Refill   • ascorbic acid (VITAMIN C) 250 MG CHEW Chew 500 mg daily     • cholecalciferol (VITAMIN D3) 25 mcg (1,000 units) tablet Take 1,000 Units by mouth daily     • famotidine (PEPCID) 20 mg tablet PRN     • loratadine-pseudoephedrine (CLARITIN-D 12-HOUR) 5-120 mg per tablet Take 1 tablet by mouth daily as needed       • medium chain triglycerides (MCT OIL) oil Take 15 mL by mouth 3 (three) times a day     • Omega-3 Fatty Acids (OMEGA-3 FISH OIL PO) Take by mouth     • Ascorbic Acid (Vitamin C) 100 MG CHEW Chew (Patient not taking: Reported on 2/28/2024)     • fluticasone (FLONASE) 50 mcg/act nasal spray 1 spray into each nostril daily (Patient not taking: Reported on 2/28/2024) 9.9 mL 0   • ketorolac (TORADOL) 10 mg tablet Take 1 tablet (10 mg total) by mouth every 8 (eight) hours as needed for moderate pain or severe pain for up to 2 days 6 tablet 0   • triamcinolone (KENALOG) 0.025 % cream Apply topically daily at bedtime for 7 days 15 g 0     No current facility-administered medications for this visit.        Allergies   Allergen Reactions   • Amoxicillin Facial Swelling     Tongue swelling       Review of Systems   Constitutional:  Positive for chills, fatigue and fever.   HENT:  Positive for postnasal drip and sore throat.    Respiratory:  Positive for cough.    Musculoskeletal:   Positive for myalgias.   Neurological:  Positive for headaches.       Video Exam    Vitals:    02/28/24 0908   Pulse: 86   Temp: 97.7 °F (36.5 °C)   TempSrc: Oral   SpO2: 97%   Weight: 116 kg (255 lb)       Physical Exam  Constitutional:       General: She is not in acute distress.  Pulmonary:      Effort: Pulmonary effort is normal. No respiratory distress.   Neurological:      Mental Status: She is alert and oriented to person, place, and time.          Visit Time  Total Visit Duration: 20

## 2024-02-28 NOTE — ASSESSMENT & PLAN NOTE
Onset of symptoms 2 days ago.  I recommend treating symptoms.  I recommend isolating for 5 days from onset of symptoms.  Patient can do anything she would normally do for cough and cold symptoms, follow-up if not improving

## 2024-05-15 ENCOUNTER — NURSE TRIAGE (OUTPATIENT)
Age: 49
End: 2024-05-15

## 2024-05-15 NOTE — TELEPHONE ENCOUNTER
"Reason for Disposition   Nausea lasts > 1 week    Answer Assessment - Initial Assessment Questions  1. NAUSEA SEVERITY: \"How bad is the nausea?\" (e.g., mild, moderate, severe; dehydration, weight loss)    - MILD: loss of appetite without change in eating habits    - MODERATE: decreased oral intake without significant weight loss, dehydration, or malnutrition    - SEVERE: inadequate caloric or fluid intake, significant weight loss, symptoms of dehydration      Mild   2. ONSET: \"When did the nausea begin?\"      Last week   3. VOMITING: \"Any vomiting?\" If Yes, ask: \"How many times today?\"      No   4. RECURRENT SYMPTOM: \"Have you had nausea before?\" If Yes, ask: \"When was the last time?\" \"What happened that time?\"      Prior to have gallbladder removed   5. CAUSE: \"What do you think is causing the nausea?\"      Unknown   Patient c/o lower back pain ,and abd.bloating after eating ,patient stated she has gained weight within the last month  Patient stated she is going to have the ordered blood work completed tomorrow     Patient states she believes its   is a digestive issue  Appt scheduled for 5/20    Protocols used: Nausea-ADULT-[place]    "

## 2024-05-16 ENCOUNTER — APPOINTMENT (OUTPATIENT)
Dept: LAB | Facility: CLINIC | Age: 49
End: 2024-05-16
Payer: COMMERCIAL

## 2024-05-16 DIAGNOSIS — E66.01 CLASS 3 SEVERE OBESITY DUE TO EXCESS CALORIES WITHOUT SERIOUS COMORBIDITY WITH BODY MASS INDEX (BMI) OF 45.0 TO 49.9 IN ADULT (HCC): ICD-10-CM

## 2024-05-16 DIAGNOSIS — N92.0 MENORRHAGIA WITH REGULAR CYCLE: ICD-10-CM

## 2024-05-16 LAB
ALBUMIN SERPL BCP-MCNC: 4.1 G/DL (ref 3.5–5)
ALP SERPL-CCNC: 81 U/L (ref 34–104)
ALT SERPL W P-5'-P-CCNC: 17 U/L (ref 7–52)
ANION GAP SERPL CALCULATED.3IONS-SCNC: 7 MMOL/L (ref 4–13)
AST SERPL W P-5'-P-CCNC: 19 U/L (ref 13–39)
BASOPHILS # BLD AUTO: 0.02 THOUSANDS/ÂΜL (ref 0–0.1)
BASOPHILS NFR BLD AUTO: 0 % (ref 0–1)
BILIRUB SERPL-MCNC: 0.69 MG/DL (ref 0.2–1)
BUN SERPL-MCNC: 15 MG/DL (ref 5–25)
CALCIUM SERPL-MCNC: 9.3 MG/DL (ref 8.4–10.2)
CHLORIDE SERPL-SCNC: 103 MMOL/L (ref 96–108)
CHOLEST SERPL-MCNC: 229 MG/DL
CO2 SERPL-SCNC: 27 MMOL/L (ref 21–32)
CREAT SERPL-MCNC: 0.65 MG/DL (ref 0.6–1.3)
EOSINOPHIL # BLD AUTO: 0.06 THOUSAND/ÂΜL (ref 0–0.61)
EOSINOPHIL NFR BLD AUTO: 1 % (ref 0–6)
ERYTHROCYTE [DISTWIDTH] IN BLOOD BY AUTOMATED COUNT: 14.4 % (ref 11.6–15.1)
EST. AVERAGE GLUCOSE BLD GHB EST-MCNC: 114 MG/DL
FERRITIN SERPL-MCNC: 34 NG/ML (ref 11–307)
GFR SERPL CREATININE-BSD FRML MDRD: 103 ML/MIN/1.73SQ M
GLUCOSE P FAST SERPL-MCNC: 89 MG/DL (ref 65–99)
HBA1C MFR BLD: 5.6 %
HCT VFR BLD AUTO: 42.9 % (ref 34.8–46.1)
HDLC SERPL-MCNC: 86 MG/DL
HGB BLD-MCNC: 13.6 G/DL (ref 11.5–15.4)
IMM GRANULOCYTES # BLD AUTO: 0.01 THOUSAND/UL (ref 0–0.2)
IMM GRANULOCYTES NFR BLD AUTO: 0 % (ref 0–2)
LDLC SERPL CALC-MCNC: 124 MG/DL (ref 0–100)
LYMPHOCYTES # BLD AUTO: 1.56 THOUSANDS/ÂΜL (ref 0.6–4.47)
LYMPHOCYTES NFR BLD AUTO: 32 % (ref 14–44)
MCH RBC QN AUTO: 29.2 PG (ref 26.8–34.3)
MCHC RBC AUTO-ENTMCNC: 31.7 G/DL (ref 31.4–37.4)
MCV RBC AUTO: 92 FL (ref 82–98)
MONOCYTES # BLD AUTO: 0.46 THOUSAND/ÂΜL (ref 0.17–1.22)
MONOCYTES NFR BLD AUTO: 10 % (ref 4–12)
NEUTROPHILS # BLD AUTO: 2.7 THOUSANDS/ÂΜL (ref 1.85–7.62)
NEUTS SEG NFR BLD AUTO: 57 % (ref 43–75)
NONHDLC SERPL-MCNC: 143 MG/DL
NRBC BLD AUTO-RTO: 0 /100 WBCS
PLATELET # BLD AUTO: 354 THOUSANDS/UL (ref 149–390)
PMV BLD AUTO: 10.1 FL (ref 8.9–12.7)
POTASSIUM SERPL-SCNC: 4.8 MMOL/L (ref 3.5–5.3)
PROT SERPL-MCNC: 8.1 G/DL (ref 6.4–8.4)
RBC # BLD AUTO: 4.66 MILLION/UL (ref 3.81–5.12)
SODIUM SERPL-SCNC: 137 MMOL/L (ref 135–147)
TRIGL SERPL-MCNC: 95 MG/DL
TSH SERPL DL<=0.05 MIU/L-ACNC: 2.58 UIU/ML (ref 0.45–4.5)
WBC # BLD AUTO: 4.81 THOUSAND/UL (ref 4.31–10.16)

## 2024-05-16 PROCEDURE — 83036 HEMOGLOBIN GLYCOSYLATED A1C: CPT

## 2024-05-16 PROCEDURE — 80053 COMPREHEN METABOLIC PANEL: CPT

## 2024-05-16 PROCEDURE — 85025 COMPLETE CBC W/AUTO DIFF WBC: CPT

## 2024-05-16 PROCEDURE — 82728 ASSAY OF FERRITIN: CPT

## 2024-05-16 PROCEDURE — 80061 LIPID PANEL: CPT

## 2024-05-16 PROCEDURE — 84443 ASSAY THYROID STIM HORMONE: CPT

## 2024-05-16 PROCEDURE — 36415 COLL VENOUS BLD VENIPUNCTURE: CPT

## 2024-05-20 ENCOUNTER — OFFICE VISIT (OUTPATIENT)
Dept: INTERNAL MEDICINE CLINIC | Facility: CLINIC | Age: 49
End: 2024-05-20
Payer: COMMERCIAL

## 2024-05-20 VITALS
HEIGHT: 64 IN | SYSTOLIC BLOOD PRESSURE: 122 MMHG | WEIGHT: 263 LBS | DIASTOLIC BLOOD PRESSURE: 76 MMHG | OXYGEN SATURATION: 98 % | BODY MASS INDEX: 44.9 KG/M2 | HEART RATE: 78 BPM

## 2024-05-20 DIAGNOSIS — M54.9 BACK PAIN, UNSPECIFIED BACK LOCATION, UNSPECIFIED BACK PAIN LATERALITY, UNSPECIFIED CHRONICITY: ICD-10-CM

## 2024-05-20 DIAGNOSIS — N95.1 PERIMENOPAUSAL: ICD-10-CM

## 2024-05-20 DIAGNOSIS — R21 RASH: ICD-10-CM

## 2024-05-20 DIAGNOSIS — F41.9 ANXIETY: Primary | ICD-10-CM

## 2024-05-20 DIAGNOSIS — R10.13 EPIGASTRIC DISCOMFORT: ICD-10-CM

## 2024-05-20 PROCEDURE — 99214 OFFICE O/P EST MOD 30 MIN: CPT | Performed by: GENERAL ACUTE CARE HOSPITAL

## 2024-05-20 RX ORDER — OMEPRAZOLE 20 MG/1
20 CAPSULE, DELAYED RELEASE ORAL DAILY
Qty: 30 CAPSULE | Refills: 1 | Status: SHIPPED | OUTPATIENT
Start: 2024-05-20

## 2024-05-20 RX ORDER — DULOXETIN HYDROCHLORIDE 20 MG/1
20 CAPSULE, DELAYED RELEASE ORAL DAILY
Qty: 30 CAPSULE | Refills: 1 | Status: SHIPPED | OUTPATIENT
Start: 2024-05-20

## 2024-05-20 NOTE — ASSESSMENT & PLAN NOTE
Reports symptoms getting worse.   Reports was on a med for anxiety many years ago had hard time getitng off it. Could not recall which med she was on.   Offered therapy referral not interested.   Discussed SSRI and SNRI, risk and benefit.   SNRI might offer extra benefit on her pain.   Can Start low dose.   F/u in 1 month if she decides to start it.

## 2024-05-20 NOTE — PATIENT INSTRUCTIONS
Try omeprazole 20mg   1% hydrocortisole OTC for rash    Will start medication for depression/anxiety. In the first month, please watch any side effects that are mentioned below. Most of the side effect are temporary and will pass after the first month. You may feel a little worse in the first month before you start to feel better. Most of these mediations start to take effect in 4-6 weeks.     Drugs of the SSRI/SNRI class can have side effects such as dry mouth, blurred vision, trouble urinating, constipation, dizziness, drowsiness, trouble sleeping, jitteriness, nausea, vomiting, diarrhea, weight gain, sexual problems. These medications are generally effective at alleviating symptoms of anxiety and/or depression. Let me know if significant side effects do occur.

## 2024-05-20 NOTE — PROGRESS NOTES
Ambulatory Visit  Name: Felicita Mathew      : 1975      MRN: [id number]  Encounter Provider: Katlyn Morton MD  Encounter Date: 2024   Encounter department: [facility]    Assessment & Plan   1. Anxiety  Assessment & Plan:  Reports symptoms getting worse.   Reports was on a med for anxiety many years ago had hard time getitng off it. Could not recall which med she was on.   Offered therapy referral not interested.   Discussed SSRI and SNRI, risk and benefit.   SNRI might offer extra benefit on her pain.   Can Start low dose.   F/u in 1 month if she decides to start it.   Orders:  -     DULoxetine (CYMBALTA) 20 mg capsule; Take 1 capsule (20 mg total) by mouth daily  2. Epigastric discomfort  Assessment & Plan:  Epigastric discomfort after eating  Etiology includes GERD, gastritis, hepatobiliary etiology, stress related, etc.   Recent labs reviewed, LFT and bili wnl.   Recommend trial of omeprazole 20mg daily.   F/u if no improvement.   Orders:  -     omeprazole (PriLOSEC) 20 mg delayed release capsule; Take 1 capsule (20 mg total) by mouth daily  3. Back pain, unspecified back location, unspecified back pain laterality, unspecified chronicity  Assessment & Plan:  Unclear etiology.   Continue to monitor.   4. Rash  5. Perimenopausal  Assessment & Plan:  Some of her systemic symptoms might be perimenopausal related.   Counseling provided.   Recommend to see gyn discuss whether HRT is indicated.          History of Present Illness     HPI  Several issues to discuss  Ache in mid back and lower back.   Worse after eating  Epigastric discomfort after eating  For a week  Nausea a week ago but improving  Under stress recently   Allergy has been back not taking anything, eye pressure, headache just started allergy med  Wakes up sweaty    Busy with work  Not eating well not sleeping well  Work related stress  Some anxiety and irritablity    Rash over chest  Lightheadedness  Wants to discuss  perimenopausal symptoms  Review of Systems  Past Medical History:   Diagnosis Date    Anxiety     Choledocholithiasis 2021    COVID-19 2022    Lymphadenopathy 2015    Numbness and tingling 2021    Paronychia of great toe of left foot 2022    Upper respiratory tract infection 2022     Past Surgical History:   Procedure Laterality Date    CHOLECYSTECTOMY LAPAROSCOPIC N/A 2021    Procedure: CHOLECYSTECTOMY LAPAROSCOPIC;  Surgeon: Kyle Forman DO;  Location[facility];  Service: General    ECTOPIC PREGNANCY SURGERY       Family History   Problem Relation Age of Onset    Heart disease Mother     Diabetes Mother     Uterine cancer Mother     Diabetes Father     Coronary artery disease Father         CABG x3, late 50s,  at 75    Heart disease Father     Hearing loss Father      Social History     Tobacco Use    Smoking status: Never    Smokeless tobacco: Never   Vaping Use    Vaping status: Former    Substances: CBD   Substance and Sexual Activity    Alcohol use: Yes     Comment: socially    Drug use: Not Currently     Types: Marijuana    Sexual activity: Not on file     Current Outpatient Medications on File Prior to Visit   Medication Sig    ascorbic acid (VITAMIN C) 250 MG CHEW Chew 500 mg daily    cholecalciferol (VITAMIN D3) 25 mcg (1,000 units) tablet Take 1,000 Units by mouth daily    famotidine (PEPCID) 20 mg tablet PRN    loratadine-pseudoephedrine (CLARITIN-D 12-HOUR) 5-120 mg per tablet Take 1 tablet by mouth daily as needed      medium chain triglycerides (MCT OIL) oil Take 15 mL by mouth 3 (three) times a day    Omega-3 Fatty Acids (OMEGA-3 FISH OIL PO) Take by mouth    Ascorbic Acid (Vitamin C) 100 MG CHEW Chew (Patient not taking: Reported on 2024)    fluticasone (FLONASE) 50 mcg/act nasal spray 1 spray into each nostril daily (Patient not taking: Reported on 2024)    ketorolac (TORADOL) 10 mg tablet Take 1 tablet (10 mg total) by mouth every 8  "(eight) hours as needed for moderate pain or severe pain for up to 2 days    triamcinolone (KENALOG) 0.025 % cream Apply topically daily at bedtime for 7 days     Allergies   Allergen Reactions    Amoxicillin Facial Swelling     Tongue swelling     Immunization History   Administered Date(s) Administered    COVID-19 MODERNA VACC 0.25 ML IM BOOSTER 12/10/2021    COVID-19 PFIZER VACCINE 0.3 ML IM 03/27/2021, 04/17/2021     Objective     /76   Pulse 78   Ht 5' 4\" (1.626 m)   Wt 119 kg (263 lb)   LMP 04/03/2024   SpO2 98%   BMI 45.14 kg/m²     Physical Exam  Administrative Statements       "

## 2024-05-21 NOTE — ASSESSMENT & PLAN NOTE
Epigastric discomfort after eating  Etiology includes GERD, gastritis, hepatobiliary etiology, stress related, etc.   Recent labs reviewed, LFT and bili wnl.   Recommend trial of omeprazole 20mg daily.   F/u if no improvement.

## 2024-05-21 NOTE — ASSESSMENT & PLAN NOTE
Some of her systemic symptoms might be perimenopausal related.   Counseling provided.   Recommend to see gyn discuss whether HRT is indicated.

## 2024-06-24 DIAGNOSIS — R10.13 EPIGASTRIC DISCOMFORT: ICD-10-CM

## 2024-06-24 DIAGNOSIS — F41.9 ANXIETY: ICD-10-CM

## 2024-06-24 RX ORDER — DULOXETIN HYDROCHLORIDE 20 MG/1
20 CAPSULE, DELAYED RELEASE ORAL DAILY
Qty: 90 CAPSULE | Refills: 1 | Status: SHIPPED | OUTPATIENT
Start: 2024-06-24

## 2024-06-24 RX ORDER — OMEPRAZOLE 20 MG/1
20 CAPSULE, DELAYED RELEASE ORAL DAILY
Qty: 90 CAPSULE | Refills: 1 | Status: SHIPPED | OUTPATIENT
Start: 2024-06-24

## 2024-07-19 ENCOUNTER — TELEPHONE (OUTPATIENT)
Age: 49
End: 2024-07-19

## 2024-07-19 NOTE — TELEPHONE ENCOUNTER
Needs to be examined and unfortunately, I cannot see her today. I do not think we have any openings today either with other providers. I will have to refer her to urgent care.

## 2024-07-19 NOTE — TELEPHONE ENCOUNTER
Patient called is having a urgent eye appointment/surgery possibly. Is having right ear fullness with crackling and pain, nasal congestion. Has been taking Zyrtec for 2 days and no relief. Patient would like recommendations. Please call and if get voicemail patient states can leave a detailed message on what recommendations.

## 2024-07-23 NOTE — TELEPHONE ENCOUNTER
Reached out to patient to advise on Reyes recommendations, patient was irritated that this is just now being related to her. I apologized and advised Reyes is now out to of office for then next 2 weeks patient reports she took a decongestant medication and it helped because see was desperate. She also reports at her last visit not with Reyes diagnosis had been used that were not correct she express she is very upset about this and wanted to discuss with .     Please return patient call [phone]

## 2025-01-21 ENCOUNTER — TELEPHONE (OUTPATIENT)
Age: 50
End: 2025-01-21

## 2025-01-21 NOTE — TELEPHONE ENCOUNTER
Patient is calling in to speak with the clinical team regarding a possible blood clot she has which she noticed it soon after taking a multi vitamin drink mix on Saturday and noticed a bump on right leg, front upper thigh. She has been keeping her leg up in hopes for it to go away and today she noticed another small blood clot behind the knee of same right leg.    She wants a callback to discuss with clinical team of what she should do

## 2025-01-21 NOTE — TELEPHONE ENCOUNTER
Pt states she has a spot on  top of right  upper thigh bigger then a quarter raised, red and swollen and it hurts. Pt tried heat and kept it raised. Pt states it may be warn too.     Pt states has another spot on right leg behind knee.

## 2025-01-22 ENCOUNTER — OFFICE VISIT (OUTPATIENT)
Dept: INTERNAL MEDICINE CLINIC | Facility: CLINIC | Age: 50
End: 2025-01-22
Payer: COMMERCIAL

## 2025-01-22 VITALS
HEART RATE: 92 BPM | BODY MASS INDEX: 40.63 KG/M2 | OXYGEN SATURATION: 98 % | HEIGHT: 64 IN | SYSTOLIC BLOOD PRESSURE: 126 MMHG | WEIGHT: 238 LBS | DIASTOLIC BLOOD PRESSURE: 78 MMHG

## 2025-01-22 DIAGNOSIS — I83.11 VARICOSE VEINS OF RIGHT LOWER EXTREMITY WITH INFLAMMATION: Primary | ICD-10-CM

## 2025-01-22 PROCEDURE — 99213 OFFICE O/P EST LOW 20 MIN: CPT | Performed by: GENERAL ACUTE CARE HOSPITAL

## 2025-01-22 NOTE — PROGRESS NOTES
"Name: Felicita Mathew      : 1975      MRN: [id number]  Encounter Provider: Katlyn Morton MD  Encounter Date: 2025   Encounter department: [facility]  :  Assessment & Plan  Varicose veins of right lower extremity with inflammation  Varicose vein noticed on b/l thighs. Superficial tender plaque noticed suspecting superficial thrombophlebitis. No LE edema. She had superficial thrombophlebitis in the past shown on doppler.   Discussed leg elevation, compression stocking, physical exercise and wt loss as general management for prevention. Given her recurrent episodes, refer to vascular for further management  Orders:    Ambulatory Referral to Vascular Surgery; Future           History of Present Illness   HPI  Started to notice lesions in right thigh  4 days ago. Red and raised and a little painful.   Review of Systems    Objective   /78   Pulse 92   Ht 5' 4\" (1.626 m)   Wt 108 kg (238 lb)   SpO2 98%   BMI 40.85 kg/m²      Physical Exam    "

## 2025-02-04 ENCOUNTER — TELEPHONE (OUTPATIENT)
Age: 50
End: 2025-02-04

## 2025-02-04 NOTE — TELEPHONE ENCOUNTER
Patient called and stated she was seen at the office on 1/22 for varicose veins on b/l thighs.Patient wanted to know is she able to take aspirin 81 mg daily to help with the varicose veins.Please advise.

## 2025-02-04 NOTE — TELEPHONE ENCOUNTER
Pt had fallen on her knees possibly slipped on ice outside.  She has been icing them, wondering if they Asprin would help with that as well. And how long she should take the Asprin. Please advise

## 2025-02-04 NOTE — TELEPHONE ENCOUNTER
The baby aspirin might not be enough to treat the pain she is experiencing from a fall.  She can try Tylenol for that pain.

## 2025-02-04 NOTE — TELEPHONE ENCOUNTER
If she has tenderness and inflammation over the varicose veins, the baby aspirin will help with those symptoms.  Advise her to take it with food

## 2025-02-07 NOTE — PROGRESS NOTES
Assessment/Plan:    Venous insufficiency of left lower extremity  Patient reports pain to left lateral thigh that is burning, aching, and itching in nature. She also notes aching in right calf that is present at rest and with activity. She has been wearing compression wraps, however reports that they frequently fall down so she is unable to wear them when she goes for walks. Patient is expressing frustration regarding persistent symptoms and the effect they have on her lifestyle. She denies any claudication, tissue loss, or motor/sensory loss at this time. She also denies any new symptoms of thrombophlebitis (warm, redness, pain, palpable cord). Plan:  -Emotional support provided and concerns addressed.  -We discussed the pathophysiology of venous insufficiency as well as contributing lifestyle factors.  -We discussed different modalities for weight loss as I believe this would improve patient's symptoms and quality of life. Referral placed to weight management. Also discussed different smart phone applications ([employer]) that may aid patient in weightloss.  -Patient plans to continue compression stocking use, as well as increasing physical activity and reducing stress as able. -Return to office as needed         Diagnoses and all orders for this visit:    Venous insufficiency of left lower extremity  -     Ambulatory Referral to Weight Management; Future    Other orders  -     Omega-3 Fatty Acids (OMEGA-3 FISH OIL PO); Take by mouth          Subjective:      Patient ID: Shon Marroquin is a 50 y.o. female. Patient is a 50year old female, nonsmoker, with history of bilateral lower extremity varicose veins. she is known to vascular surgery and was last seen in the office 7/27/23 for recurrent superficial thrombophlebitis of left thigh and lateral calf. She is returning to the office due to concerns regarding persistent pain.       Patient reports pain to left lateral thigh that is burning, aching, and itching in nature. She also notes aching in right calf that is present at rest and with activity. She has been wearing compression wraps, however reports that they frequently fall down so she is unable to wear them when she goes for walks. Patient is expressing frustration regarding persistent symptoms and the effect they have on her lifestyle. Patient is trying to lose weight, she is currently following an intermittent fasting regimen. She also goes for walks daily. She prefers a naturopathic approach to healthcare and tries to avoid any unnessecary medications or surgical procedures. 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 The following portions of the patient's history were reviewed and updated as appropriate: allergies, current medications, past family history, past medical history, past social history, past surgical history and problem list.    Review of Systems   Constitutional: Negative. HENT: Negative. Eyes: Negative. Respiratory: Negative. Cardiovascular: Positive for leg swelling. Gastrointestinal: Negative. Endocrine: Negative. Genitourinary: Negative. Musculoskeletal: Negative. Bilateral LE pain   Skin: Negative. Negative for color change, pallor and wound. Allergic/Immunologic: Negative. Neurological: Positive for numbness. Seizures: numbness and tingling. Hematological: Negative. Psychiatric/Behavioral: Negative. All other systems reviewed and are negative. Objective:    /86 (BP Location: Left arm, Patient Position: Sitting, Cuff Size: Standard)   Pulse 61   Ht 5' 4" (1.626 m)   Wt 117 kg (259 lb)   SpO2 100%   BMI 44.46 kg/m²          Physical Exam  Constitutional:       General: She is not in acute distress. Appearance: Normal appearance. She is obese. HENT:      Head: Normocephalic and atraumatic. Cardiovascular:      Rate and Rhythm: Normal rate and regular rhythm.       Pulses: Dorsalis pedis pulses are detected w/ Doppler on the right side and detected w/ Doppler on the left side. Posterior tibial pulses are 1+ on the right side and 1+ on the left side. Comments: Multiphasic DP and PT doppler signals bilaterally  Pulmonary:      Effort: Pulmonary effort is normal. No respiratory distress. Musculoskeletal:         General: No swelling or tenderness. Right lower leg: No edema. Left lower leg: No edema. Feet:      Right foot:      Skin integrity: Skin integrity normal.      Toenail Condition: Right toenails are normal.      Left foot:      Skin integrity: Skin integrity normal.      Toenail Condition: Left toenails are normal.   Skin:     General: Skin is warm and dry. Capillary Refill: Capillary refill takes less than 2 seconds. Findings: No rash or wound. Comments: Scattered bulging varicose veins and reticular veins bilaterally   Neurological:      General: No focal deficit present. Mental Status: She is alert and oriented to person, place, and time. Psychiatric:         Mood and Affect: Mood normal. Affect is tearful. Behavior: Behavior normal.       I have reviewed and made appropriate changes to the review of systems input by the medical assistant.     Vitals:    08/18/23 0955   BP: 130/86   BP Location: Left arm   Patient Position: Sitting   Cuff Size: Standard   Pulse: 61   SpO2: 100%   Weight: 117 kg (259 lb)   Height: 5' 4" (1.626 m)       Patient Active Problem List   Diagnosis   • Choledocholithiasis   • Superficial thrombophlebitis of lower leg   • Numbness and tingling   • Status post cholecystectomy   • Venous insufficiency of left lower extremity   • Class 3 severe obesity without serious comorbidity with body mass index (BMI) of 45.0 to 49.9 in adult [facility])   • GERD (gastroesophageal reflux disease)   • Anxiety   • Bradycardia   • Paronychia of great toe of left foot   • Elevated blood pressure reading   • Tonsillolith   • Lymphadenopathy   • COVID-19   • Upper respiratory tract infection       Past Surgical History:   Procedure Laterality Date   • CHOLECYSTECTOMY LAPAROSCOPIC N/A 2021    Procedure: CHOLECYSTECTOMY LAPAROSCOPIC;  Surgeon: Alin Doe DO;  Location: [facility];  Service: General   • ECTOPIC PREGNANCY SURGERY         Family History   Problem Relation Age of Onset   • Heart disease Mother    • Diabetes Mother    • Uterine cancer Mother    • Diabetes Father    • Coronary artery disease Father         CABG x3, late 46s,  at 76   • Heart disease Father    • Hearing loss Father        Social History     Socioeconomic History   • Marital status: Single     Spouse name: Not on file   • Number of children: Not on file   • Years of education: Not on file   • Highest education level: Not on file   Occupational History   • Not on file   Tobacco Use   • Smoking status: Never   • Smokeless tobacco: Never   Vaping Use   • Vaping Use: Some days   • Substances: CBD   Substance and Sexual Activity   • Alcohol use: Yes     Comment: socially   • Drug use: Yes     Types: Marijuana   • Sexual activity: Not on file   Other Topics Concern   • Not on file   Social History Narrative   • Not on file     Social Determinants of Health     Financial Resource Strain: Not on file   Food Insecurity: Not on file   Transportation Needs: Not on file   Physical Activity: Not on file   Stress: Not on file   Social Connections: Not on file   Intimate Partner Violence: Not on file   Housing Stability: Not on file       Allergies   Allergen Reactions   • Amoxicillin Facial Swelling         Current Outpatient Medications:   •  Ascorbic Acid (Vitamin C) 100 MG CHEW, Chew, Disp: , Rfl:   •  ascorbic acid (VITAMIN C) 250 MG CHEW, Chew 250 mg daily, Disp: , Rfl:   •  cholecalciferol (VITAMIN D3) 25 mcg (1,000 units) tablet, Take 1,000 Units by mouth daily, Disp: , Rfl:   •  famotidine (PEPCID) 20 mg tablet,  0 Refill(s), Type: Maintenance, Disp: , Rfl:   •  fluticasone (FLONASE) 50 mcg/act nasal spray, 1 spray into each nostril daily, Disp: 9.9 mL, Rfl: 0  •  loratadine-pseudoephedrine (CLARITIN-D 12-HOUR) 5-120 mg per tablet, Take 1 tablet by mouth daily as needed  , Disp: , Rfl:   •  Omega-3 Fatty Acids (OMEGA-3 FISH OIL PO), Take by mouth, Disp: , Rfl:     I have spent a total time of 30 minutes on 08/18/23 in caring for this patient including Diagnostic results, Prognosis, Risks and benefits of tx options, Instructions for management, Patient and family education, Importance of tx compliance, Risk factor reductions, Impressions, Counseling / Coordination of care, Documenting in the medical record, Reviewing / ordering tests, medicine, procedures   and Obtaining or reviewing history  . Home

## 2025-02-24 ENCOUNTER — HOSPITAL ENCOUNTER (OUTPATIENT)
Dept: NON INVASIVE DIAGNOSTICS | Facility: HOSPITAL | Age: 50
Discharge: HOME | End: 2025-02-24
Payer: COMMERCIAL

## 2025-02-24 ENCOUNTER — RESULTS FOLLOW-UP (OUTPATIENT)
Dept: VASCULAR SURGERY | Facility: CLINIC | Age: 50
End: 2025-02-24

## 2025-02-24 ENCOUNTER — NURSE TRIAGE (OUTPATIENT)
Age: 50
End: 2025-02-24

## 2025-02-24 DIAGNOSIS — I87.2 VENOUS INSUFFICIENCY OF LEFT LOWER EXTREMITY: ICD-10-CM

## 2025-02-24 DIAGNOSIS — I80.00 SUPERFICIAL THROMBOPHLEBITIS OF LOWER LEG: Primary | ICD-10-CM

## 2025-02-24 DIAGNOSIS — I80.00 SUPERFICIAL THROMBOPHLEBITIS OF LOWER LEG: ICD-10-CM

## 2025-02-24 PROCEDURE — 93971 EXTREMITY STUDY: CPT

## 2025-02-24 PROCEDURE — 93971 EXTREMITY STUDY: CPT | Performed by: SURGERY

## 2025-02-24 NOTE — PROGRESS NOTES
Name: Felicita Mathew      : 1975      MRN: [id number]  Encounter Provider: SHIN Duarte  Encounter Date: 2025   Encounter department: [facility]    49-year-old female with hx of GERD, anxiety, obesity BMI 40.85kg/m, hx of SVT returns to the office for review of LEV done on 25.  Assessment & Plan  Superficial thrombophlebitis of lower leg  25 LEV  Lt:No DVT. There is evidence of acute non-occlusive superficial thrombophlebitis in the anterior accessory saphenous vein in the mid thigh extending into varicose veins in the mid and distal thigh.  -Symptoms started  with minimal relief. Noted to have L medial anterior thigh area of redness with erythremia that is tender to the touch.   -Reports she is compliant with compression daily, unsure of strength of compression perhaps 15-20mmHg. States this feels 'too tight'  Advised medical grade 20-30mmHg and utilize various materials if nylon is uncomfortable.   -Elevation on occasion.  -She has been taking ibuprofen for the pain.   -Using cold compress, discussed that warm compress is beneficial.   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   -Reviewed LEV from  and discussed no indication for vascular intervention. Continue with supportive measures with compression, elevation, weight loss, low sodium diet and increased exercise. Discussed if she has re-occurrence she can call the office for additional ultrasound.     Plan  -Continue wearing compression. Utilize medical grade compression.  -Leg elevation. Goal of 3-4 times a day 15 minutes at a time.  -Increase exercise and ambulation.Goal of 3-4 times a week for 30 min.  -Advised weight loss and increased exercise.  -Call the office with any new or worsening symptoms.  -Return to the office as needed.            Varicose veins of right lower extremity with inflammation    Orders:    Ambulatory Referral to Vascular Surgery        History of Present  Illness     HPI  Felicita Mathew is a 49 y.o. female GERD, anxiety, Obesity BMI 40.85kg/m , hx of SVT returns to the office for review of LEV done on 2/24/25.    L medial thigh SVT that started last Sunday 2/16. She reports a fall one week prior to the SVT. States that the area has been contained to the left medial thigh.    Reports that she was using ice to the area.   She has been SVT 3x in the past.  History obtained from: patient    Review of Systems   Constitutional: Negative.    HENT: Negative.     Eyes: Negative.    Respiratory: Negative.     Cardiovascular:  Positive for leg swelling.   Gastrointestinal: Negative.    Endocrine: Negative.    Genitourinary: Negative.    Musculoskeletal:         Leg pain   Skin: Negative.    Allergic/Immunologic: Negative.    Neurological: Negative.    Hematological: Negative.    Psychiatric/Behavioral: Negative.       Current Outpatient Medications on File Prior to Visit   Medication Sig Dispense Refill    Ascorbic Acid (Vitamin C) 100 MG CHEW Chew      ascorbic acid (VITAMIN C) 250 MG CHEW Chew 500 mg daily      cholecalciferol (VITAMIN D3) 25 mcg (1,000 units) tablet Take 1,000 Units by mouth daily      DULoxetine (CYMBALTA) 20 mg capsule TAKE 1 CAPSULE BY MOUTH EVERY DAY 90 capsule 1    famotidine (PEPCID) 20 mg tablet PRN      loratadine-pseudoephedrine (CLARITIN-D 12-HOUR) 5-120 mg per tablet Take 1 tablet by mouth daily as needed        medium chain triglycerides (MCT OIL) oil Take 15 mL by mouth 3 (three) times a day      Omega-3 Fatty Acids (OMEGA-3 FISH OIL PO) Take by mouth      omeprazole (PriLOSEC) 20 mg delayed release capsule TAKE 1 CAPSULE BY MOUTH EVERY DAY 90 capsule 1    fluticasone (FLONASE) 50 mcg/act nasal spray 1 spray into each nostril daily (Patient not taking: Reported on 2/28/2024) 9.9 mL 0    ketorolac (TORADOL) 10 mg tablet Take 1 tablet (10 mg total) by mouth every 8 (eight) hours as needed for moderate pain or severe pain for up to 2 days  (Patient not taking: Reported on 2025) 6 tablet 0    triamcinolone (KENALOG) 0.025 % cream Apply topically daily at bedtime for 7 days (Patient not taking: Reported on 2025) 15 g 0     No current facility-administered medications on file prior to visit.         Objective   There were no vitals taken for this visit.     Physical Exam  Vitals reviewed.   Constitutional:       General: She is not in acute distress.     Appearance: Normal appearance. She is obese. She is not ill-appearing.   HENT:      Head: Normocephalic and atraumatic.   Cardiovascular:      Rate and Rhythm: Normal rate and regular rhythm.      Pulses: Normal pulses.           Dorsalis pedis pulses are 2+ on the right side and 2+ on the left side.      Heart sounds: No murmur heard.  Pulmonary:      Effort: Pulmonary effort is normal. No respiratory distress.      Breath sounds: Normal breath sounds.   Musculoskeletal:         General: Tenderness present.      Right lower le+ Edema present.      Left lower le+ Edema present.      Comments: L medial thigh   Skin:     General: Skin is warm and dry.      Findings: Erythema present.          Neurological:      General: No focal deficit present.      Mental Status: She is alert and oriented to person, place, and time.      Sensory: No sensory deficit.      Motor: No weakness.      Gait: Gait normal.   Psychiatric:         Mood and Affect: Mood normal.         Behavior: Behavior normal.         Administrative Statements   I have spent a total time of 20 minutes in caring for this patient on the day of the visit/encounter including Diagnostic results, Risks and benefits of tx options, Instructions for management, Patient and family education, Importance of tx compliance, Documenting in the medical record, Reviewing/placing orders in the medical record (including tests, medications, and/or procedures), and Obtaining or reviewing history  .

## 2025-02-24 NOTE — TELEPHONE ENCOUNTER
Reviewed recommend LEVD to rule out DVT and measure thrombophlebitis. Also recommend warm compresses 3 x day to help with discomfort and wearing compression stockings. Recommend OV w/ AP in 1-2 weeks

## 2025-02-24 NOTE — TELEPHONE ENCOUNTER
"Regarding: Leg pain and discoloration  ----- Message from Tere ZELAYA sent at 2/24/2025  8:13 AM EST -----  Patient reports pain and discoloration in her leg that she feels may be another superficial clot, however this one feels like it may be \"travelling\".    "

## 2025-02-24 NOTE — TELEPHONE ENCOUNTER
"Pt was last seen on 8/18/2023. Hx venous insufficiency of LLE. Last LEV done on 6/28/2023.    Received call from pt stating starting last week, she noticed pain, swelling, and discoloration on her L lateral thigh. She stated it started off as a small lump but then has progressively traveled up her thigh. She describes the pain as a dull / ache / discomfort that comes and goes. Denies chest pain. States does have a little sob when walking up the stairs starting in the past week. Denies dizziness/lightheadedness. Of note, pt did see her pcp on 1/22/2025 who stated, \"Varicose vein noticed on b/l thighs. Superficial tender plaque noticed suspecting superficial thrombophlebitis. No LE edema. She had superficial thrombophlebitis in the past shown on doppler.   Discussed leg elevation, compression stocking, physical exercise and wt loss as general management for prevention. Given her recurrent episodes, refer to vascular for further management\". Last two days have taken a baby asa as instructed by her pcp.  Pt did send a photo of the area through [employer]. Please see photo.     Advised pt will send a message to the provider to review and advise on next steps.       Answer Assessment - Initial Assessment Questions  1. ONSET: \"When did the pain start?\"       Sunday / Monday of last week started noticing some redness, pain, and swelling on the L thigh. States the swelling and the \"lump\" is spreading up the thigh.   2. LOCATION: \"Where is the pain located?\"       L lateral thigh   3. PAIN: \"How bad is the pain?\"    (Scale 1-10; or mild, moderate, severe)      Dull aching pain / discomfort. Comes and goes. States heat makes it worse but cold compresses help.   4. WORK OR EXERCISE: \"Has there been any recent work or exercise that involved this part of the body?\"       States went on an exercise machine on Saturday and states the lump on her thigh might have gotten bigger   5. CAUSE: \"What do you think is causing the leg pain?\"    " "  Unsure   6. OTHER SYMPTOMS: \"Do you have any other symptoms?\" (e.g., chest pain, back pain, breathing difficulty, swelling, rash, fever, numbness, weakness)      Fatigue. Denies chest pain. States does have a little sob when walking up the stairs starting in the past week. Denies dizziness/lightheadedness.Last two days have taken a baby asa   7. PREGNANCY: \"Is there any chance you are pregnant?\" \"When was your last menstrual period?\"      Menopause    Protocols used: Leg Pain-Adult-[place]    "

## 2025-02-26 ENCOUNTER — OFFICE VISIT (OUTPATIENT)
Dept: VASCULAR SURGERY | Facility: CLINIC | Age: 50
End: 2025-02-26
Payer: COMMERCIAL

## 2025-02-26 VITALS
WEIGHT: 238 LBS | HEART RATE: 76 BPM | DIASTOLIC BLOOD PRESSURE: 84 MMHG | BODY MASS INDEX: 40.63 KG/M2 | HEIGHT: 64 IN | SYSTOLIC BLOOD PRESSURE: 140 MMHG

## 2025-02-26 DIAGNOSIS — I80.00 SUPERFICIAL THROMBOPHLEBITIS OF LOWER LEG: Primary | ICD-10-CM

## 2025-02-26 DIAGNOSIS — I83.11 VARICOSE VEINS OF RIGHT LOWER EXTREMITY WITH INFLAMMATION: ICD-10-CM

## 2025-02-26 PROCEDURE — 99213 OFFICE O/P EST LOW 20 MIN: CPT | Performed by: NURSE PRACTITIONER

## 2025-02-26 NOTE — ASSESSMENT & PLAN NOTE
2/24/25 LEV  Lt:No DVT. There is evidence of acute non-occlusive superficial thrombophlebitis in the anterior accessory saphenous vein in the mid thigh extending into varicose veins in the mid and distal thigh.  -Symptoms started 2/16 with minimal relief. Noted to have L medial anterior thigh area of redness with erythremia that is tender to the touch.   -Reports she is compliant with compression daily, unsure of strength of compression perhaps 15-20mmHg. States this feels 'too tight'  Advised medical grade 20-30mmHg and utilize various materials if nylon is uncomfortable.   -Elevation on occasion.  -She has been taking ibuprofen for the pain.   -Using cold compress, discussed that warm compress is beneficial.   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   -Reviewed LEV from 2/24 and discussed no indication for vascular intervention. Continue with supportive measures with compression, elevation, weight loss, low sodium diet and increased exercise. Discussed if she has re-occurrence she can call the office for additional ultrasound.     Plan  -Continue wearing compression. Utilize medical grade compression.  -Leg elevation. Goal of 3-4 times a day 15 minutes at a time.  -Increase exercise and ambulation.Goal of 3-4 times a week for 30 min.  -Advised weight loss and increased exercise.  -Call the office with any new or worsening symptoms.  -Return to the office as needed.

## 2025-02-26 NOTE — PATIENT INSTRUCTIONS
"- Call the office if you experience any changes to your legs or feet such as new pain, redness or swelling.    - Stay active. Exercise everyday. Walking is the recommended exercise with a goal of 30 min 3-4 times a week. A healthy weight can assist in decreasing varicose vein symptoms.     - Wear Compression. Put them on in the morning, wear all day and take off before bed at night.     -Elevate your legs above the level of your heart. Elevate for 15 minutes 3-4 times a day.     -When looking at buying compression, look for \"gradient compression\" with a weight 20-30mmHg (medium weight), knee high is fine.  -Try \"[email or web address]\"    -A good brand is Sigvaris, Jobt. knee high.   "

## 2025-04-24 ENCOUNTER — NURSE TRIAGE (OUTPATIENT)
Age: 50
End: 2025-04-24

## 2025-04-24 ENCOUNTER — PATIENT MESSAGE (OUTPATIENT)
Dept: VASCULAR SURGERY | Facility: CLINIC | Age: 50
End: 2025-04-24

## 2025-04-24 NOTE — TELEPHONE ENCOUNTER
Please get left LEVD for evaluation.  In the meantime agree with conservative measures: warm compresses to affected area, NSAIDs if able to take, and gentle compression.    Go to ED with CP/SOB.    Additional +/- recommendations to follow pending results of imaging.

## 2025-04-24 NOTE — TELEPHONE ENCOUNTER
"FOLLOW UP: Please reach out to patient with further recommendations.     REASON FOR CONVERSATION: left leg pain  Patient calling, states she thinks she has another superficial blood clot on her left upper thigh. Location is above her previous blood clot, closer to the groin (see patient message from 2/24/25).   Patient states he noticed it this afternoon. The area is red, feels hard to the touch and mild pain. She will apply warm compresses and compression for now.   Please review and reach out to patient with further recommendations.    SYMPTOMS: mild pain, redness, area feels hard to the touch    OTHER: Patient will upload pictures to [employer]    DISPOSITION: No disposition on file.    Answer Assessment - Initial Assessment Questions  1. ONSET: \"When did the pain start?\"       Today   2. LOCATION: \"Where is the pain located?\"       Left thigh  3. PAIN: \"How bad is the pain?\"    (Scale 1-10; or mild, moderate, severe)      mild  4. WORK OR EXERCISE: \"Has there been any recent work or exercise that involved this part of the body?\"       denies  5. CAUSE: \"What do you think is causing the leg pain?\"      Patient has history of superficial thrombosis in same leg  6. OTHER SYMPTOMS: \"Do you have any other symptoms?\" (e.g., chest pain, back pain, breathing difficulty, swelling, rash, fever, numbness, weakness)      Red, hard to touch  7. PREGNANCY: \"Is there any chance you are pregnant?\" \"When was your last menstrual period?\"      denies    Protocols used: Leg Pain-Adult-[place]    "

## 2025-04-24 NOTE — TELEPHONE ENCOUNTER
"Answer Assessment - Initial Assessment Questions  1. ONSET: \"When did the swelling start?\" (e.g., minutes, hours, days)      1-2 days ago   2. SEVERITY: \"How swollen is it?\"      Red swollen in left upper thigh.   4. PAIN: \"Is the swelling painful to touch?\" If Yes, ask: \"How painful is it?\"   (Scale 1-10; mild, moderate or severe)      No   5. CAUSE: \"What do you think is causing the lip swelling?\"      Hx of blood clots.   6. RECURRENT SYMPTOM: \"Have you had lip swelling before?\" If Yes, ask: \"When was the last time?\" \"What happened that time?\"      Recurrent    Protocols used: Lip Swelling-Adult-[facility]    "

## 2025-04-24 NOTE — TELEPHONE ENCOUNTER
"Reason for Disposition  • [1] Thigh, calf, or ankle swelling AND [2] bilateral AND [3] 1 side is more swollen    Answer Assessment - Initial Assessment Questions  1. ONSET: \"When did the swelling start?\" (e.g., minutes, hours, days)      1-2 days ago   2. SEVERITY: \"How swollen is it?\"      Red swollen in left upper thigh.   4. PAIN: \"Is the swelling painful to touch?\" If Yes, ask: \"How painful is it?\"   (Scale 1-10; mild, moderate or severe)      No   5. CAUSE: \"What do you think is causing the lip swelling?\"      Hx of blood clots.   6. RECURRENT SYMPTOM: \"Have you had lip swelling before?\" If Yes, ask: \"When was the last time?\" \"What happened that time?\"      Recurrent    Answer Assessment - Initial Assessment Questions  1. ONSET: \"When did the swelling start?\" (e.g., minutes, hours, days)      1-2 days ago   2. LOCATION: \"What part of the leg is swollen?\"  \"Are both legs swollen or just one leg?\"      Left upper thigh   4. REDNESS: \"Does the swelling look red or infected?\"      Yes   6. FEVER: \"Do you have a fever?\" If Yes, ask: \"What is it, how was it measured, and when did it start?\"       No   7. CAUSE: \"What do you think is causing the leg swelling?\"      Recurrent superficial thrombophlebitis.   8. MEDICAL HISTORY: \"Do you have a history of blood clots (e.g., DVT), cancer, heart failure, kidney disease, or liver failure?\"      Hx of superficial thrombophlebitis.   9. RECURRENT SYMPTOM: \"Have you had leg swelling before?\" If Yes, ask: \"When was the last time?\" \"What happened that time?\"      Recurrent   10. OTHER SYMPTOMS: \"Do you have any other symptoms?\" (e.g., chest pain, difficulty breathing)        No   11. PREGNANCY: \"Is there any chance you are pregnant?\" \"When was your last menstrual period?\"        No    Protocols used: Lip Swelling-Adult-[place], Leg Swelling and Edema-Adult-[facility]    "

## 2025-04-24 NOTE — TELEPHONE ENCOUNTER
FOLLOW UP: ASAP    REASON FOR CONVERSATION: Leg Swelling    SYMPTOMS: swelling in a upper left thigh.     OTHER: Patient follows vascular specialty. Patient was advised to call Vascular center now. Patient agreeable     DISPOSITION:  Now (overriding See HPC Within 4 Hours (Or PCP Triage))

## 2025-04-25 ENCOUNTER — HOSPITAL ENCOUNTER (OUTPATIENT)
Dept: NON INVASIVE DIAGNOSTICS | Facility: HOSPITAL | Age: 50
Discharge: HOME | End: 2025-04-25
Attending: NURSE PRACTITIONER
Payer: COMMERCIAL

## 2025-04-25 DIAGNOSIS — I87.2 VENOUS INSUFFICIENCY OF LEFT LOWER EXTREMITY: ICD-10-CM

## 2025-04-25 DIAGNOSIS — I80.00 SUPERFICIAL THROMBOPHLEBITIS OF LOWER LEG: ICD-10-CM

## 2025-04-25 DIAGNOSIS — I80.00 SUPERFICIAL THROMBOPHLEBITIS OF LOWER LEG: Primary | ICD-10-CM

## 2025-04-25 PROCEDURE — 93971 EXTREMITY STUDY: CPT

## 2025-04-25 NOTE — TELEPHONE ENCOUNTER
Scheduled LEV, patient was reluctant to agree but spoke w/ patient regarding risks. Patient will call and cancel should she decline.

## 2025-04-26 PROCEDURE — 93971 EXTREMITY STUDY: CPT | Performed by: STUDENT IN AN ORGANIZED HEALTH CARE EDUCATION/TRAINING PROGRAM

## 2025-04-27 ENCOUNTER — RESULTS FOLLOW-UP (OUTPATIENT)
Dept: OTHER | Facility: HOSPITAL | Age: 50
End: 2025-04-27

## (undated) DEVICE — VIAL DECANTER

## (undated) DEVICE — TROCAR: Brand: KII FIOS FIRST ENTRY

## (undated) DEVICE — INTENDED FOR TISSUE SEPARATION, AND OTHER PROCEDURES THAT REQUIRE A SHARP SURGICAL BLADE TO PUNCTURE OR CUT.: Brand: BARD-PARKER SAFETY BLADES SIZE 11, STERILE

## (undated) DEVICE — TUBING SMOKE EVAC W/FILTRATION DEVICE PLUMEPORT ACTIV

## (undated) DEVICE — ADHESIVE SKIN HIGH VISCOSITY EXOFIN 1ML

## (undated) DEVICE — SUT MONOCRYL 4-0 PS-2 27 IN Y426H

## (undated) DEVICE — ALLENTOWN LAP CHOLE APP PACK: Brand: CARDINAL HEALTH

## (undated) DEVICE — LIGHT HANDLE COVER SLEEVE DISP BLUE STELLAR

## (undated) DEVICE — CHLORAPREP HI-LITE 26ML ORANGE

## (undated) DEVICE — DRAPE EQUIPMENT RF WAND

## (undated) DEVICE — IRRIG ENDO FLO TUBING

## (undated) DEVICE — LIGAMAX 5 MM ENDOSCOPIC MULTIPLE CLIP APPLIER: Brand: LIGAMAX

## (undated) DEVICE — NEEDLE 22 G X 1 1/2 SAFETY

## (undated) DEVICE — COTTON TIP APPLICTOR 2 PK

## (undated) DEVICE — HARMONIC 1100 SHEARS, 36CM SHAFT LENGTH: Brand: HARMONIC

## (undated) DEVICE — TROCAR: Brand: KII® SLEEVE

## (undated) DEVICE — GLOVE SRG BIOGEL ORTHOPEDIC 8

## (undated) DEVICE — SUT VICRYL 0 UR-6 27 IN J603H